# Patient Record
Sex: MALE | Race: WHITE | NOT HISPANIC OR LATINO | Employment: OTHER | URBAN - METROPOLITAN AREA
[De-identification: names, ages, dates, MRNs, and addresses within clinical notes are randomized per-mention and may not be internally consistent; named-entity substitution may affect disease eponyms.]

---

## 2018-06-12 ENCOUNTER — CONVERSION ENCOUNTER (OUTPATIENT)
Dept: FAMILY MEDICINE CLINIC | Facility: CLINIC | Age: 68
End: 2018-06-12

## 2018-06-12 ENCOUNTER — OFFICE VISIT CONVERTED (OUTPATIENT)
Dept: FAMILY MEDICINE CLINIC | Facility: CLINIC | Age: 68
End: 2018-06-12
Attending: FAMILY MEDICINE

## 2018-10-22 ENCOUNTER — CONVERSION ENCOUNTER (OUTPATIENT)
Dept: FAMILY MEDICINE CLINIC | Facility: CLINIC | Age: 68
End: 2018-10-22

## 2018-10-22 ENCOUNTER — OFFICE VISIT CONVERTED (OUTPATIENT)
Dept: FAMILY MEDICINE CLINIC | Facility: CLINIC | Age: 68
End: 2018-10-22
Attending: FAMILY MEDICINE

## 2018-11-20 ENCOUNTER — OFFICE VISIT CONVERTED (OUTPATIENT)
Dept: CARDIOLOGY | Facility: CLINIC | Age: 68
End: 2018-11-20
Attending: SPECIALIST

## 2018-11-20 ENCOUNTER — CONVERSION ENCOUNTER (OUTPATIENT)
Dept: CARDIOLOGY | Facility: CLINIC | Age: 68
End: 2018-11-20

## 2019-05-21 ENCOUNTER — OFFICE VISIT CONVERTED (OUTPATIENT)
Dept: CARDIOLOGY | Facility: CLINIC | Age: 69
End: 2019-05-21
Attending: SPECIALIST

## 2019-08-26 ENCOUNTER — OFFICE VISIT CONVERTED (OUTPATIENT)
Dept: FAMILY MEDICINE CLINIC | Facility: CLINIC | Age: 69
End: 2019-08-26
Attending: FAMILY MEDICINE

## 2019-08-27 ENCOUNTER — HOSPITAL ENCOUNTER (OUTPATIENT)
Dept: GENERAL RADIOLOGY | Facility: HOSPITAL | Age: 69
Discharge: HOME | End: 2019-08-27
Attending: FAMILY MEDICINE

## 2019-08-29 ENCOUNTER — HOSPITAL ENCOUNTER (OUTPATIENT)
Dept: GENERAL RADIOLOGY | Facility: HOSPITAL | Age: 69
Discharge: HOME | End: 2019-08-29
Attending: FAMILY MEDICINE

## 2019-09-25 ENCOUNTER — HOSPITAL ENCOUNTER (OUTPATIENT)
Dept: CARDIOLOGY | Facility: HOSPITAL | Age: 69
Discharge: HOME | End: 2019-09-25
Attending: SURGERY

## 2019-12-03 ENCOUNTER — OFFICE VISIT CONVERTED (OUTPATIENT)
Dept: CARDIOLOGY | Facility: CLINIC | Age: 69
End: 2019-12-03
Attending: SPECIALIST

## 2019-12-03 ENCOUNTER — CONVERSION ENCOUNTER (OUTPATIENT)
Dept: OTHER | Facility: HOSPITAL | Age: 69
End: 2019-12-03

## 2020-06-16 ENCOUNTER — OFFICE VISIT CONVERTED (OUTPATIENT)
Dept: CARDIOLOGY | Facility: CLINIC | Age: 70
End: 2020-06-16
Attending: SPECIALIST

## 2020-08-11 ENCOUNTER — CONVERSION ENCOUNTER (OUTPATIENT)
Dept: FAMILY MEDICINE CLINIC | Facility: CLINIC | Age: 70
End: 2020-08-11

## 2020-08-11 ENCOUNTER — OFFICE VISIT CONVERTED (OUTPATIENT)
Dept: FAMILY MEDICINE CLINIC | Facility: CLINIC | Age: 70
End: 2020-08-11
Attending: FAMILY MEDICINE

## 2020-08-20 ENCOUNTER — HOSPITAL ENCOUNTER (OUTPATIENT)
Dept: PREADMISSION TESTING | Facility: HOSPITAL | Age: 70
Discharge: HOME | End: 2020-08-20
Attending: INTERNAL MEDICINE

## 2020-08-21 LAB — SARS-COV-2 RNA SPEC QL NAA+PROBE: NOT DETECTED

## 2020-08-25 ENCOUNTER — HOSPITAL ENCOUNTER (OUTPATIENT)
Dept: INFUSION THERAPY | Facility: HOSPITAL | Age: 70
Setting detail: HOSPITAL OUTPATIENT SURGERY
Discharge: HOME | End: 2020-08-25
Attending: INTERNAL MEDICINE

## 2020-08-25 LAB
ANION GAP SERPL CALC-SCNC: 15 MMOL/L (ref 8–19)
BASOPHILS # BLD AUTO: 0.06 10*3/UL (ref 0–0.2)
BASOPHILS NFR BLD AUTO: 0.7 % (ref 0–3)
BUN SERPL-MCNC: 22 MG/DL (ref 5–25)
BUN/CREAT SERPL: 22 {RATIO} (ref 6–20)
CALCIUM SERPL-MCNC: 10 MG/DL (ref 8.7–10.4)
CHLORIDE SERPL-SCNC: 106 MMOL/L (ref 99–111)
CONV ABS IMM GRAN: 0.02 10*3/UL (ref 0–0.2)
CONV CO2: 25 MMOL/L (ref 22–32)
CONV IMMATURE GRAN: 0.2 % (ref 0–1.8)
CREAT UR-MCNC: 1.02 MG/DL (ref 0.7–1.2)
DEPRECATED RDW RBC AUTO: 45.3 FL (ref 35.1–43.9)
EOSINOPHIL # BLD AUTO: 0.69 10*3/UL (ref 0–0.7)
EOSINOPHIL # BLD AUTO: 8.1 % (ref 0–7)
ERYTHROCYTE [DISTWIDTH] IN BLOOD BY AUTOMATED COUNT: 13.1 % (ref 11.6–14.4)
GFR SERPLBLD BASED ON 1.73 SQ M-ARVRAT: >60 ML/MIN/{1.73_M2}
GLUCOSE SERPL-MCNC: 117 MG/DL (ref 70–99)
HCT VFR BLD AUTO: 47.5 % (ref 42–52)
HGB BLD-MCNC: 15.3 G/DL (ref 14–18)
INR PPP: 0.93 (ref 2–3)
LYMPHOCYTES # BLD AUTO: 3.5 10*3/UL (ref 1–5)
LYMPHOCYTES NFR BLD AUTO: 41.3 % (ref 20–45)
MCH RBC QN AUTO: 30.6 PG (ref 27–31)
MCHC RBC AUTO-ENTMCNC: 32.2 G/DL (ref 33–37)
MCV RBC AUTO: 95 FL (ref 80–96)
MONOCYTES # BLD AUTO: 0.75 10*3/UL (ref 0.2–1.2)
MONOCYTES NFR BLD AUTO: 8.8 % (ref 3–10)
NEUTROPHILS # BLD AUTO: 3.46 10*3/UL (ref 2–8)
NEUTROPHILS NFR BLD AUTO: 40.9 % (ref 30–85)
NRBC CBCN: 0 % (ref 0–0.7)
OSMOLALITY SERPL CALC.SUM OF ELEC: 298 MOSM/KG (ref 273–304)
PLATELET # BLD AUTO: 260 10*3/UL (ref 130–400)
PMV BLD AUTO: 9.7 FL (ref 9.4–12.4)
POTASSIUM SERPL-SCNC: 4.3 MMOL/L (ref 3.5–5.3)
PROTHROMBIN TIME: 10.2 S (ref 9.4–12)
RBC # BLD AUTO: 5 10*6/UL (ref 4.7–6.1)
SODIUM SERPL-SCNC: 142 MMOL/L (ref 135–147)
WBC # BLD AUTO: 8.48 10*3/UL (ref 4.8–10.8)

## 2020-10-01 ENCOUNTER — OFFICE VISIT CONVERTED (OUTPATIENT)
Dept: CARDIOLOGY | Facility: CLINIC | Age: 70
End: 2020-10-01
Attending: SPECIALIST

## 2020-10-26 ENCOUNTER — OFFICE VISIT CONVERTED (OUTPATIENT)
Dept: ORTHOPEDIC SURGERY | Facility: CLINIC | Age: 70
End: 2020-10-26
Attending: STUDENT IN AN ORGANIZED HEALTH CARE EDUCATION/TRAINING PROGRAM

## 2020-11-03 ENCOUNTER — CONVERSION ENCOUNTER (OUTPATIENT)
Dept: FAMILY MEDICINE CLINIC | Facility: CLINIC | Age: 70
End: 2020-11-03

## 2020-11-03 ENCOUNTER — OFFICE VISIT CONVERTED (OUTPATIENT)
Dept: FAMILY MEDICINE CLINIC | Facility: CLINIC | Age: 70
End: 2020-11-03
Attending: FAMILY MEDICINE

## 2020-11-03 ENCOUNTER — HOSPITAL ENCOUNTER (OUTPATIENT)
Dept: FAMILY MEDICINE CLINIC | Facility: CLINIC | Age: 70
Discharge: HOME | End: 2020-11-03
Attending: FAMILY MEDICINE

## 2020-11-04 LAB
ALBUMIN SERPL-MCNC: 4.4 G/DL (ref 3.5–5)
ALBUMIN/GLOB SERPL: 1.4 {RATIO} (ref 1.4–2.6)
ALP SERPL-CCNC: 58 U/L (ref 56–155)
ALT SERPL-CCNC: 20 U/L (ref 10–40)
ANION GAP SERPL CALC-SCNC: 20 MMOL/L (ref 8–19)
AST SERPL-CCNC: 22 U/L (ref 15–50)
BASOPHILS # BLD AUTO: 0.04 10*3/UL (ref 0–0.2)
BASOPHILS NFR BLD AUTO: 0.5 % (ref 0–3)
BILIRUB SERPL-MCNC: 0.44 MG/DL (ref 0.2–1.3)
BUN SERPL-MCNC: 23 MG/DL (ref 5–25)
BUN/CREAT SERPL: 24 {RATIO} (ref 6–20)
CALCIUM SERPL-MCNC: 9.3 MG/DL (ref 8.7–10.4)
CHLORIDE SERPL-SCNC: 103 MMOL/L (ref 99–111)
CHOLEST SERPL-MCNC: 114 MG/DL (ref 107–200)
CHOLEST/HDLC SERPL: 2.8 {RATIO} (ref 3–6)
CONV ABS IMM GRAN: 0.02 10*3/UL (ref 0–0.2)
CONV CO2: 22 MMOL/L (ref 22–32)
CONV IMMATURE GRAN: 0.3 % (ref 0–1.8)
CONV TOTAL PROTEIN: 7.5 G/DL (ref 6.3–8.2)
CREAT UR-MCNC: 0.94 MG/DL (ref 0.7–1.2)
DEPRECATED RDW RBC AUTO: 45.4 FL (ref 35.1–43.9)
EOSINOPHIL # BLD AUTO: 0.07 10*3/UL (ref 0–0.7)
EOSINOPHIL # BLD AUTO: 0.9 % (ref 0–7)
ERYTHROCYTE [DISTWIDTH] IN BLOOD BY AUTOMATED COUNT: 12.8 % (ref 11.6–14.4)
GFR SERPLBLD BASED ON 1.73 SQ M-ARVRAT: >60 ML/MIN/{1.73_M2}
GLOBULIN UR ELPH-MCNC: 3.1 G/DL (ref 2–3.5)
GLUCOSE SERPL-MCNC: 92 MG/DL (ref 70–99)
HCT VFR BLD AUTO: 45.1 % (ref 42–52)
HDLC SERPL-MCNC: 41 MG/DL (ref 40–60)
HGB BLD-MCNC: 14.5 G/DL (ref 14–18)
LDLC SERPL CALC-MCNC: 55 MG/DL (ref 70–100)
LYMPHOCYTES # BLD AUTO: 3.63 10*3/UL (ref 1–5)
LYMPHOCYTES NFR BLD AUTO: 46.4 % (ref 20–45)
MCH RBC QN AUTO: 30.7 PG (ref 27–31)
MCHC RBC AUTO-ENTMCNC: 32.2 G/DL (ref 33–37)
MCV RBC AUTO: 95.3 FL (ref 80–96)
MONOCYTES # BLD AUTO: 0.77 10*3/UL (ref 0.2–1.2)
MONOCYTES NFR BLD AUTO: 9.8 % (ref 3–10)
NEUTROPHILS # BLD AUTO: 3.3 10*3/UL (ref 2–8)
NEUTROPHILS NFR BLD AUTO: 42.1 % (ref 30–85)
NRBC CBCN: 0 % (ref 0–0.7)
OSMOLALITY SERPL CALC.SUM OF ELEC: 295 MOSM/KG (ref 273–304)
PLATELET # BLD AUTO: 274 10*3/UL (ref 130–400)
PMV BLD AUTO: 10.1 FL (ref 9.4–12.4)
POTASSIUM SERPL-SCNC: 4 MMOL/L (ref 3.5–5.3)
PSA SERPL-MCNC: 3.32 NG/ML (ref 0–4)
RBC # BLD AUTO: 4.73 10*6/UL (ref 4.7–6.1)
SODIUM SERPL-SCNC: 141 MMOL/L (ref 135–147)
TRIGL SERPL-MCNC: 92 MG/DL (ref 40–150)
TSH SERPL-ACNC: 2.41 M[IU]/L (ref 0.27–4.2)
VLDLC SERPL-MCNC: 18 MG/DL (ref 5–37)
WBC # BLD AUTO: 7.83 10*3/UL (ref 4.8–10.8)

## 2020-11-05 LAB
CONV HEPATITIS C AB WITH REFLEX TO CONFIRMATION: <0.1 S/CO RATIO (ref 0–0.9)
CONV HEPATITIS COMMENT: NORMAL

## 2021-01-19 ENCOUNTER — HOSPITAL ENCOUNTER (OUTPATIENT)
Dept: OTHER | Facility: HOSPITAL | Age: 71
Discharge: HOME | End: 2021-01-19
Attending: INTERNAL MEDICINE

## 2021-02-12 ENCOUNTER — HOSPITAL ENCOUNTER (OUTPATIENT)
Dept: VACCINE CLINIC | Facility: HOSPITAL | Age: 71
Discharge: HOME | End: 2021-02-12
Attending: INTERNAL MEDICINE

## 2021-04-06 ENCOUNTER — OFFICE VISIT CONVERTED (OUTPATIENT)
Dept: CARDIOLOGY | Facility: CLINIC | Age: 71
End: 2021-04-06
Attending: SPECIALIST

## 2021-04-20 ENCOUNTER — HOSPITAL ENCOUNTER (OUTPATIENT)
Dept: FAMILY MEDICINE CLINIC | Facility: CLINIC | Age: 71
Discharge: HOME | End: 2021-04-20
Attending: FAMILY MEDICINE

## 2021-04-20 ENCOUNTER — OFFICE VISIT CONVERTED (OUTPATIENT)
Dept: FAMILY MEDICINE CLINIC | Facility: CLINIC | Age: 71
End: 2021-04-20
Attending: FAMILY MEDICINE

## 2021-04-20 LAB
BASOPHILS # BLD AUTO: 0.04 10*3/UL (ref 0–0.2)
BASOPHILS NFR BLD AUTO: 0.5 % (ref 0–3)
CONV ABS IMM GRAN: 0.01 10*3/UL (ref 0–0.2)
CONV IMMATURE GRAN: 0.1 % (ref 0–1.8)
DEPRECATED RDW RBC AUTO: 48.2 FL (ref 35.1–43.9)
EOSINOPHIL # BLD AUTO: 0.1 10*3/UL (ref 0–0.7)
EOSINOPHIL # BLD AUTO: 1.2 % (ref 0–7)
ERYTHROCYTE [DISTWIDTH] IN BLOOD BY AUTOMATED COUNT: 13.2 % (ref 11.6–14.4)
HCT VFR BLD AUTO: 43.3 % (ref 42–52)
HGB BLD-MCNC: 13.8 G/DL (ref 14–18)
LYMPHOCYTES # BLD AUTO: 3.85 10*3/UL (ref 1–5)
LYMPHOCYTES NFR BLD AUTO: 44.8 % (ref 20–45)
MCH RBC QN AUTO: 31.2 PG (ref 27–31)
MCHC RBC AUTO-ENTMCNC: 31.9 G/DL (ref 33–37)
MCV RBC AUTO: 98 FL (ref 80–96)
MONOCYTES # BLD AUTO: 0.87 10*3/UL (ref 0.2–1.2)
MONOCYTES NFR BLD AUTO: 10.1 % (ref 3–10)
NEUTROPHILS # BLD AUTO: 3.73 10*3/UL (ref 2–8)
NEUTROPHILS NFR BLD AUTO: 43.3 % (ref 30–85)
NRBC CBCN: 0 % (ref 0–0.7)
PLATELET # BLD AUTO: 277 10*3/UL (ref 130–400)
PMV BLD AUTO: 10.3 FL (ref 9.4–12.4)
RBC # BLD AUTO: 4.42 10*6/UL (ref 4.7–6.1)
WBC # BLD AUTO: 8.6 10*3/UL (ref 4.8–10.8)

## 2021-04-21 ENCOUNTER — HOSPITAL ENCOUNTER (OUTPATIENT)
Dept: GASTROENTEROLOGY | Facility: HOSPITAL | Age: 71
Setting detail: HOSPITAL OUTPATIENT SURGERY
Discharge: HOME | End: 2021-04-21
Attending: INTERNAL MEDICINE

## 2021-05-10 NOTE — H&P
History and Physical      Patient Name: Alexandre Alvarado   Patient ID: [id number]   Sex: Male   YOB: 1950    Primary Care Provider: Hawk Ruth III MD   Referring Provider: Hawk Ruth III MD    Visit Date: October 26, 2020    Provider: Wolf Ellington MD   Location: [facility]   Location Address: [address] [place] [address]  [id number]   Location Phone: [phone]          Chief Complaint  · left knee pain      History Of Present Illness  Alexandre Alvarado is a 70 year old /White male who presents today to [facility].      Alexandre presented to the office today for examination of his left knee. He states the pain started 8 weeks ago. He feels that he has strained it somehow while he was fishing. He first experienced the pain while he was sitting down and attempted to turn to his side, to stand up and go down some steps. Later that night his knee became sore. The next day he could barley walk.  He had swelling initially that resolved.  He states that he feels that he has no strength in his knee now. He experiences predominantly anterior knee pain.  He states there is popping and cracking of the knee right over the patella. He says that he feels his knee will give out at times. He is not taking any medication other than ibuprofen. He denies previous history or knee injections or surgeries. He has history of 3 previous back surgery. He is a nonsmoker. He is nondiabetic. He is otherwise overall healthy.                    Past Medical History  Abdominal Aortic Aneurysm; Advance directive discussed with patient; Back Pain ; Cardiac Dysrhythmia; Cerumen Impaction; Chronic back pain; Great toe pain, left; High cholesterol; Hip Pain; History of colonoscopy; Hyperlipidemia; Hypertension, essential; Lumbago/low back pain; Medication management; Medication refill; Myocardial Infarction; Pacemaker; Prostatic Hypertrophy, Benign; Screening for cancer; unspecified; Shoulder  "pain, left         Past Surgical History  Back surgery; Pacemaker         Medication List  benazepril-hydrochlorothiazide 20-12.5 mg oral tablet; Eliquis 5 mg oral tablet; rosuvastatin 20 mg oral tablet; sildenafil 100 mg oral tablet         Allergy List  NO KNOWN DRUG ALLERGIES         Family Medical History  Chronic Obstructive Pulmonary Disease; Stroke; Heart Disease; Cancer, Unspecified; Diabetes, unspecified type; Heart failure; Spine Problems         Social History  Active but no formal exercise; Alcohol (Current some day); ; Tobacco (Never)         Immunizations  Name Date Admin   Influenza 11/11/2014   Influenza 10/22/2014   Pneumococcal 10/22/2014   Prevnar 13 11/14/2016         Review of Systems  · Constitutional  o Denies  o : fever, chills, weight loss  · Cardiovascular  o Denies  o : chest pain, shortness of breath  · Gastrointestinal  o Denies  o : liver disease, heartburn, nausea, blood in stools  · Genitourinary  o Denies  o : painful urination, blood in urine  · Integument  o Denies  o : rash, itching  · Neurologic  o Denies  o : headache, weakness, loss of consciousness  · Musculoskeletal  o Admits  o : left knee pain   · Psychiatric  o Denies  o : drug/alcohol addiction, anxiety, depression      Vitals  Date Time BP Position Site L\R Cuff Size HR RR TEMP (F) WT  HT  BMI kg/m2 BSA m2 O2 Sat FR L/min FiO2        10/26/2020 02:33 PM      87 - R   210lbs 8oz 5'  11\" 29.36 2.19 98 %            Physical Examination  · Constitutional  o Appearance  o : well developed, well-nourished, no obvious deformities present  · Head and Face  o Head  o :   § Inspection  § : normocephalic  o Face  o :   § Inspection  § : no facial lesions  · Eyes  o Conjunctivae  o : conjunctivae normal  o Sclerae  o : sclerae white  · Ears, Nose, Mouth and Throat  o Ears  o :   § External Ears  § : appearance within normal limits  § Hearing  § : intact  o Nose  o :   § External Nose  § : appearance " normal  · Neck  o Inspection/Palpation  o : normal appearance  o Range of Motion  o : full range of motion  · Respiratory  o Respiratory Effort  o : breathing unlabored  o Inspection of Chest  o : normal appearance  o Auscultation of Lungs  o : no audible wheezing or rales  · Cardiovascular  o Heart  o : regular rate  · Gastrointestinal  o Abdominal Examination  o : soft and non-tender  · Skin and Subcutaneous Tissue  o General Inspection  o : intact, no rashes  · Psychiatric  o General  o : Alert and oriented x3  o Judgement and Insight  o : judgment and insight intact  o Mood and Affect  o : mood normal, affect appropriate  · In Office Procedures  o View  o : Standing AP/Standing Lateral/Sunrise/30 degree PA  o Site  o : left, knee  o Indication  o : Left knee pain   o Study  o : X-rays ordered, taken in the office, and reviewed today.  o Xray  o : Weightbearing AP, PA, lateral, and sunrise views of the left knee have been reviewed. There is a mild varus deformity. Minimal arthritic change noted along the medial lateral compartments. There is adequate joint space remaining. Patellofemoral osteophytes are noted. Peripheral spurring in the patellofemoral compartment noted. No other abnormalities appreciated.  o Comparative Data  o : No comparative data found  · Hip Exam  o Hip Exam  o :   § Right Hip  § : No pain with passive hip flexion, internal rotation, external rotation  § Left Hip  § : No pain with passive hip flexion, internal rotation, external rotation  · Knee Exam  o Knee Exam  o :   § Right Knee  § : Full, nonpainful range of motion. No effusion. No joint line pain. Stable to ligamentous stress. Neurovascular intact distally.  § Left Knee  § : No effusion. No medial or lateral joint line pain. Baker's cyst noted posteriorly. Crepitus noted with patellofemoral motion. Range of motion from less than 5 degrees short of extension to 130 degrees of flexion. Painful with terminal flexion. Stable to varus,  valgus, Lachman, posterior drawer testing. No pain or joint line clicking with Laurie's testing. Neurovascular intact distally.          Assessment  · Primary osteoarthritis of left knee     715.16/M17.12  · Left knee pain, unspecified chronicity     719.46/M25.562      Plan  · Orders  o Knee (Left) [facility] ([id number]) - 719.46/M25.562 - 10/26/2020  · Medications  o Medications have been Reconciled  o Transition of Care or Provider Policy  · Instructions  o X-rays reviewed by Dr. Ellington.  o Reviewed the patient's Past Medical, Social, and Family history as well as the ROS at today's visit, no changes.  o Call or return if worsening symptoms.  o Exercise handout given.  o Follow Up PRN.  o Alexandre has left knee arthritis primarily affecting the patellofemoral compartment. He seems to have exacerbated this approximately 2 months ago. His swelling and pain are improving. He has a nonantalgic gait and is not requiring any assistive devices. He does not have any symptoms consistent with internal derangement. We discussed treatment options. He may continue to take anti-inflammatory medications as able. We discussed physical therapy to help his range of motion and strength within the knee. We discussed formal therapy versus a home exercise program. He would like to start with a home exercise program. He was interested in home exercises for his core and back as well given his long history of back issues. We provided him with a handout for those today. We discussed follow-up. He would like to follow-up as needed going forward. He may call with any questions or concerns.  o Electronically Identified Patient Education Materials Provided Electronically            Electronically Signed by: Wolf Ellington MD -Author on October 27, 2020 08:38:38 PM

## 2021-05-12 ENCOUNTER — HOSPITAL ENCOUNTER (OUTPATIENT)
Dept: GASTROENTEROLOGY | Facility: HOSPITAL | Age: 71
Setting detail: HOSPITAL OUTPATIENT SURGERY
Discharge: HOME | End: 2021-05-12
Attending: INTERNAL MEDICINE

## 2021-05-13 NOTE — PROGRESS NOTES
Progress Note      Patient Name: Alexandre Alvarado   Patient ID: [id number]   Sex: Male   YOB: 1950    Primary Care Provider: Hawk Ruth III MD   Referring Provider: Hawk Ruth III MD    Visit Date: November 3, 2020    Provider: Hawk Ruth III MD   Location: [facility]   Location Address: [address] [place] [address]  [id number]   Location Phone: [phone]          Chief Complaint  · Adult General Male Physical Exam      History Of Present Illness  Alexandre Alvarado is a 70 year old /White male who presents for evaluation and treatment of: here for complete physical.      HPI     patient 70 years old here today for complete physical.  He did check his pacemaker to see if he was said any of these were atrial fibrillation.  No chest pain.  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  Wondering if he needs to be on the ill closer just aspirin.      history of hypertension   history of a pacemaker   history of a TIA in 2017 has been on Eliquis for a while.    history of atrial fib that was intermittent.  Still having occasional palpitations.     Review of systems     skin skeletal sprain left knee while fishing saw Dr. Ellington and does have a good bit of osteoarthritis cartilages good.  Has his continues to have his lower back pain.  But is still able to golf and fish.  GI had a colonoscopy that was normal in 2015 not due till 2025  Skin no lesions  Neurologic no further TIAs since the 1/20/2017.  Had him see Dr. Aguayo then  Cardiovascular history of a myocardial infarction.  Sees Dr. pettit  Psych no particular depression or anxiety.  New.   vacuum pump helps    Physical exam    weight is 206 4 pound weight loss temperature 98.5 pulse ox 94 heart rate 80 blood pressure 128/72  General no distress  Cardiovascular regular rhythm no murmur pacemaker in place  Respiratory no increased work of breathing lungs clear and equal bilaterally  no rales no rhonchi no wheezes  Skin numerous seborrheic keratotic mitoses no other lesions  Neurologic mentation is normal speech is normal gait is normal  Musculoskeletal hips show good range of motion knees show some crepitations  Rectal 1+ prostate hypertrophy no asymmetry no nodules  Abdomen soft and tender no hepatosplenomegaly no masses no abnormal pulsations  Neck no adenopathy no thyroidomegaly  ENT TMs are negative no oral lesions    assessment     #1 Hypertension controlled   #2 coronary artery disease Dr. pettit is sees doing well has pacemaker  TIA with history of atrial fib on Eliquis will check back with Dr. Dahl for more documentation of the atrial fib.  Next osteoarthritis left knee next chronic lower back pain    Plan     recheck as needed we will discuss with  but stay on all meds  .       Past Medical History  Disease Name Date Onset Notes   Abdominal Aortic Aneurysm --  --    Advance directive discussed with patient 08/11/2020 --    Arthritis --  --    Back Pain  --  --    Cardiac Dysrhythmia --  --    Cerumen Impaction 08/11/2020 --    Chronic back pain 08/11/2020 --    Great toe pain, left 08/11/2020 --    Heart Attack --  --    Heart Disease --  --    High cholesterol 08/11/2020 --    Hip Pain --  --    History of colonoscopy 08/11/2020 --    Hyperlipidemia --  --    Hypertension, essential --  --    Lumbago/low back pain 10/17/2012 --    Medication management 08/11/2020 --    Medication refill 08/11/2020 --    Myocardial Infarction 2011 --    Pacemaker 08/11/2020 --    Prostatic Hypertrophy, Benign --  --    Screening for cancer; unspecified 09/10/2014 --    Seasonal allergies --  --    Shoulder pain, left 08/11/2020 --    Stroke --  --          Past Surgical History  Procedure Name Date Notes   Back --  --    Back surgery --  --    Colonoscopy --  --    heart surgery --  --    Pacemaker --  --    Pacemaker. --  --          Medication List  Name Date Started Instructions    benazepril-hydrochlorothiazide 20-12.5 mg oral tablet 11/03/2020 take 1 tablet by oral route once daily   Eliquis 5 mg oral tablet 11/03/2020 TAKE 1 TABLET BY MOUTH TWICE A DAY   rosuvastatin 20 mg oral tablet 11/03/2020 take 1 tablet (20 mg) by oral route once daily for 90 days   sildenafil 100 mg oral tablet 11/03/2020 take 1 tablet (100 mg) by oral route once daily as needed approximately 1 hour before sexual activity         Allergy List  Allergen Name Date Reaction Notes   NO KNOWN DRUG ALLERGIES --  --  --          Family Medical History  Disease Name Relative/Age Notes   Chronic Obstructive Pulmonary Disease Father/   --    Stroke Father/   Father   Heart Disease Father/   --    Cancer, Unspecified Father/  Mother/   Mother; Father   Diabetes, unspecified type  --    Heart failure  --    Spine Problems  --    Osteoporosis Mother/   Mother   Family history of Arthritis Mother/   Mother         Social History  Finding Status Start/Stop Quantity Notes   Active but no formal exercise --  --/-- --  --    Alcohol Current some day --/-- --  06/05/2017 - 04/19/2017 - 3-4 glasses of wine a week    Alcohol Use Current some day --/-- --  rarely drinks, less than 1 drink per day, has been drinking for 31 or more years   lives with spouse --  --/-- --  --     --  --/-- --  --    . --  --/-- --  --    Recreational Drug Use Never --/-- --  no   Retired. --  --/-- --  --    Tobacco Never --/-- --  never smoker         Immunizations  NameDate Admin Mfg Trade Name Lot Number Route Inj VIS Given VIS Publication   Jmebfsmmt24/15/2020 SKB Fluarix, quadrivalent, preservative free [id number] NE NE 11/03/2020    Comments: [employer]sh[employer]er   Qldvzkawsyzc25/22/2014 MSD PNEUMOVAX 23 [id number] IM RD 10/22/2014 10/06/2009   Comments:    Prevnar 1311/14/2016 WAL PREVNAR 13 [id number] IM LD 11/14/2016 11/05/2015   Comments:          Review of Systems  · Constitutional  o * See HPI  · Eyes  o * See HPI  · HENT  o * See HPI  · Breasts  o * See  "HPI  · Cardiovascular  o * See HPI  · Respiratory  o * See HPI  · Gastrointestinal  o * See HPI  · Genitourinary  o * See HPI  · Integument  o * See HPI  · Neurologic  o * See HPI  · Musculoskeletal  o * See HPI  · Endocrine  o * See HPI  · Psychiatric  o * See HPI  · Heme-Lymph  o * See HPI  · Allergic-Immunologic  o * See HPI      Vitals  Date Time BP Position Site L\R Cuff Size HR RR TEMP (F) WT  HT  BMI kg/m2 BSA m2 O2 Sat FR L/min FiO2 HC       11/03/2020 04:09 /72 Sitting    80 - R  98.5 205lbs 16oz 5'  11\" 28.73 2.16 94 %  21%              Results  · In-Office Procedures  o Lab procedure  § IOP - Urinalysis without Microscopy (Clinitek) [facility] ([id number])   § Color Ur: Yellow   § Clarity Ur: Clear   § Glucose Ur Ql Strip: Negative   § Bilirub Ur Ql Strip: Negative   § Ketones Ur Ql Strip: Negative   § Sp Gr Ur Qn: 1.030   § Hgb Ur Ql Strip: Negative   § pH Ur-LsCnc: 6.0   § Prot Ur Ql Strip: Negative   § Urobilinogen Ur Strip-mCnc: 0.2 E.U./dL   § Nitrite Ur Ql Strip: Negative   § WBC Est Ur Ql Strip: Negative       Assessment  · Screening for depression     V79.0/Z13.89  · Need for hepatitis C screening test     V73.89/Z11.59  · Screening for prostate cancer     V76.44/Z12.5  · Encounter for screening for cardiovascular disorders     V81.2/Z13.6  · General Medical Exam, Adult (CPE)     V70.0/Z00.00  · High cholesterol     272.0/E78.00  · Medication management     V58.69/Z79.899  · Medication refill     V68.1/Z76.0  · Pacemaker     V45.01/Z95.0  · Hypertension, essential     401.9/I10    Problems Reconciled  Plan  · Orders  o Hepatitis C antibody MEDICARE screening [facility] (, [address]) - V73.89/Z11.59 - 11/03/2020  o Male Physical Primary Care Panel (CMP, CBC, TSH, Lipid, PSA) [facility] (, [address], [address], [address], [address]) - V70.0/Z00.00, V58.69/Z79.899, V76.44/Z12.5, V81.2/Z13.6 - 11/03/2020  o ACO-39: Current medications updated and reviewed (, 9953F) - - " 11/03/2020  · Medications  o benazepril-hydrochlorothiazide 20-12.5 mg oral tablet   SIG: take 1 tablet by oral route once daily   DISP: (90) Tablet with 3 refills  Refilled on 11/03/2020     o Eliquis 5 mg oral tablet   SIG: TAKE 1 TABLET BY MOUTH TWICE A DAY   DISP: (180) Tablet with 3 refills  Refilled on 11/03/2020     o rosuvastatin 20 mg oral tablet   SIG: take 1 tablet (20 mg) by oral route once daily for 90 days   DISP: (90) Tablet with 3 refills  Refilled on 11/03/2020     o sildenafil 100 mg oral tablet   SIG: take 1 tablet (100 mg) by oral route once daily as needed approximately 1 hour before sexual activity   DISP: (30) Tablet with 11 refills  Refilled on 11/03/2020     o Medications have been Reconciled  o Transition of Care or Provider Policy  · Instructions  o Depression Screen completed and scanned into the EMR under the designated folder within the patient's documents.  o Today's PHQ-9 result is 3___  o CMS (Medicare) estimates that one in six persons older than 65 suffers from depression and that depression in older patients occurs in 25% of those with other medical illnesses. US Preventative Services Task Force indicates that depression screening and support improves clinical outcomes in older adults. This service is covered by Medicare once a year as part of helping maintain a healthy you!  o The provider screening met the required time of 15 minutes.  o Medicare suggests a once in a lifetime screening for Hepatitis C for all Medicare beneficiaries born between 4143-6816.  o Patient is taking medications as prescribed and doing well.   o Take all medications as prescribed/directed.  o Patient instructed/educated on their diet and exercise program.  o Patient was educated/instructed on their diagnosis, treatment and medications prior to discharge from the clinic today.  o Bring all medicines with their bottles to each office visit.  o Time spent with the patient was 30 minutes, more than 50% face  to face.  o Chronic conditions reviewed and taken into consideration for today's treatment plan.  o Discussed Covid-19 precautions including, but not limited to, social distancing, avoid touching your face, and hand washing.             Electronically Signed by: Tanika Pace, -Author on November 3, 2020 06:00:24 PM  Electronically Co-signed by: Hawk Ruth III MD -Reviewer on November 4, 2020 01:17:50 PM

## 2021-05-13 NOTE — PROGRESS NOTES
"   Progress Note      Patient Name: Alexandre Alvarado   Patient ID: [id number]   Sex: Male   YOB: 1950    Primary Care Provider: Hawk Ruth III MD   Referring Provider: Hawk Ruth III MD    Visit Date: October 1, 2020    Provider: Kosta Dahl MD   Location: [facility]   Location Address: [address] [place] [address], [address]   [place], [place]  [id number]   Location Phone: [phone]          Chief Complaint  · Bradycardia   · Presence of pacemaker       History Of Present Illness  Alexandre Alvarado is a 70 year old /White male with a history of a pacemaker implantation, who comes in for a follow-up visit and pacemaker check. He denies any chest pain. No shortness of breath.   CURRENT MEDICATIONS: include Benazepri/HCT 20/12.5 mg daily; Eliquis 5 mg b.i.d.; Rosuvastatin 20 mg daily. The dosage and frequency of the medications were reviewed with the patient.   PAST MEDICAL HISTORY: Bradycardia; essential hypertension; hyperlipidemia; presence of pacemaker.   PSYCHOSOCIAL HISTORY: He rarely drinks alcohol.       Review of Systems  · Cardiovascular  o Admits  o : palpitations (fast, fluttering, or skipping beats), shortness of breath while walking or lying flat  o Denies  o : swelling (feet, ankles, hands), chest pain or angina pectoris   · Respiratory  o Denies  o : chronic or frequent cough, asthma or wheezing      Vitals  Date Time BP Position Site L\R Cuff Size HR RR TEMP (F) WT  HT  BMI kg/m2 BSA m2 O2 Sat FR L/min FiO2 HC       10/01/2020 01:37 /76 Sitting    64 - R   209lbs 0oz 5'  11\" 29.15 2.18       10/01/2020 01:37 /80 Sitting    66 - R                   Physical Examination  · Constitutional  o Appearance  o : Awake, alert, cooperative, pleasant.  · Respiratory  o Inspection of Chest  o : No chest wall deformities, moving equal.  o Auscultation of Lungs  o : Good air entry with vesicular breath sounds.  · Cardiovascular  o Heart  o :   § Auscultation of " Heart  § : S1 and S2 regular. No S3. No S4. No murmurs.  o Peripheral Vascular System  o :   § Extremities  § : Peripheral pulses were well felt. No edema. No cyanosis.  · Gastrointestinal  o Abdominal Examination  o : No masses or organomegaly noted.          Assessment     ASSESSMENT AND PLAN:    1.  Presence of pacemaker:  Pacemaker was checked with a .  RV sensitivity was reprogrammed.       He feels better.  2.  Essential hypertension controlled:  Continue current dose of Benazepril.  3.  Paroxysmal atrial fibrillation:  Continue Eliquis for stroke prevention.  4.  See me back in 6 months.    Kosta Dahl MD, [facility]  YOSEF/dia           This note was transcribed by Elisha Hancock.  dia/YOSEF  The above service was transcribed by Elisha Hancock, and I attest to the accuracy of the note.  YOSEF               Electronically Signed by: Elisha Hancock-, -Author on October 7, 2020 05:26:34 AM  Electronically Co-signed by: Kosta Dahl MD -Reviewer on October 7, 2020 11:36:00 AM

## 2021-05-13 NOTE — PROGRESS NOTES
Progress Note      Patient Name: Alexandre Alvarado   Patient ID: [id number]   Sex: Male   YOB: 1950    Primary Care Provider: Hawk Ruth III MD   Referring Provider: Hawk Ruth III MD    Visit Date: August 11, 2020    Provider: Hawk Ruth III MD   Location: [facility]   Location Address: [address] [place] [address]  [id number]   Location Phone: [phone]          Chief Complaint  · check left great toe  · check ears      History Of Present Illness  Alexandre Alvarado is a 70 year old /White male who presents for evaluation and treatment of: wants to have his ears check and his great toe. Pt is scheduled for his yearly check up and lab work is due but the pt request to do his yearly check up at a later time.   Removal of Ear Wax  Ear lavage for both ears after unsuccessful attempt of removal of wax plug with curette and/or cotton swab.   Ear flushed with warm water until wax plug flushed out. Provider re-examined ear and removed remaining wax with curette and dry cotton swab.      HPI     patient 70-year-old male history of hypertension on Benzapril hydrochlorothiazide 2012.5 takes Eliquis for atrial fib and Crestor 24 hypercholesterolemia has a pacemaker for his complete heart block has been feeling reasonably well.  Has chronic low back pain but is been doing well with that recently. pt wants his ears checked and he is having left great toe pain and left shoulder pain.    Review of systems    GI last colonoscopy was 2015 not due again since he is 70.    Cardiovascular no chest pain no palpitations pacemaker kick in at 67 needs to see Dr. Vanegas  respiratory no shortness of breath no dyspnea on exertion  Musculoskeletal pain has right toe or metatarsal phalangeal joint.  Discussed a stiff soled will inject with 10 Depo-Medrol  ENT decreased hearing    PE    Blood pressure 130/70 weight 208 temperature 97.8 heart rate 84 pulse ox 96 General no distress  EENT bilateral cerumen impactions need to lavage  Cardiovascular regular rhythm occasional extra beat no murmur  Respiratory no increased work of breathing lungs clinical bilaterally no rales no rhonchi no wheezes  Musculoskeletal osteoarthritis left great toe metatarsal phalangeal joint new    In office procedure     after careful chlorhexidine prep patient had 10 mg Depo-Medrol injected into the metatarsal phalangeal joint.    In office procedure     patient had both cerumen impactions lavaged out here in the office.    Assessment /Plan    #1 bilateral cerumen impactions   #2 osteoarthritis great toe Depo-Medrol injected 10 mg left great toe metatarsal phalangeal joint   #3 chronic back pain controlled   #4 atrial fib on Eliquis number  #5 Hypertension     recheck for his physical later.       Past Medical History  Disease Name Date Onset Notes   Abdominal Aortic Aneurysm --  --    Advance directive discussed with patient 08/11/2020 --    Back Pain  --  --    Cardiac Dysrhythmia --  --    Cerumen Impaction 08/11/2020 --    Chronic back pain 08/11/2020 --    Great toe pain, left 08/11/2020 --    High cholesterol 08/11/2020 --    Hip Pain --  --    History of colonoscopy 08/11/2020 --    Hyperlipidemia --  --    Hypertension, essential --  --    Lumbago/low back pain 10/17/2012 --    Medication management 08/11/2020 --    Medication refill 08/11/2020 --    Myocardial Infarction 2011 --    Pacemaker 08/11/2020 --    Prostatic Hypertrophy, Benign --  --    Screening for cancer; unspecified 09/10/2014 --    Shoulder pain, left 08/11/2020 --          Past Surgical History  Procedure Name Date Notes   Back surgery --  --    Pacemaker --  --          Medication List  Name Date Started Instructions   benazepril-hydrochlorothiazide 20-12.5 mg oral tablet 08/11/2020 take 1 tablet by oral route once daily   Eliquis 5 mg oral tablet 08/11/2020 TAKE 1 TABLET BY MOUTH TWICE A DAY   rosuvastatin 20 mg oral tablet 08/11/2020 take  "1 tablet (20 mg) by oral route once daily for 90 days         Allergy List  Allergen Name Date Reaction Notes   NO KNOWN DRUG ALLERGIES --  --  --        Allergies Reconciled  Family Medical History  Disease Name Relative/Age Notes   Chronic Obstructive Pulmonary Disease Father/   --    Stroke  --    Heart Disease Father/   --    Cancer, Unspecified Mother/   --    Diabetes, unspecified type  --    Heart failure  --    Spine Problems  --          Social History  Finding Status Start/Stop Quantity Notes   Active but no formal exercise --  --/-- --  --    Alcohol Current some day --/-- --  06/05/2017 - 04/19/2017 - 3-4 glasses of wine a week     --  --/-- --  --    Tobacco Never --/-- --  --          Immunizations  NameDate Admin Mfg Trade Name Lot Number Route Inj VIS Given VIS Publication   Gquvgoiwz58/11/2014 SKB Fluarix, quadrivalent, preservative free [id number] [facility] 11/11/2014 07/02/2012   Comments:    Qbzdeklcr34/22/2014 SKB Fluarix, quadrivalent, preservative free 2A2KX IM LD 10/22/2014 07/02/2012   Comments:    Ztzmcpviiijo77/22/2014 MSD PNEUMOVAX 23 [id number] IM RD 10/22/2014 10/06/2009   Comments:    Prevnar 1311/14/2016 WAL PREVNAR 13 [id number]  LD 11/14/2016 11/05/2015   Comments:          Review of Systems  · Constitutional  o * See HPI  · Eyes  o * See HPI  · HENT  o * See HPI  · Breasts  o * See HPI  · Cardiovascular  o * See HPI  · Respiratory  o * See HPI  · Gastrointestinal  o * See HPI  · Genitourinary  o * See HPI  · Integument  o * See HPI  · Neurologic  o * See HPI  · Musculoskeletal  o * See HPI  · Endocrine  o * See HPI  · Psychiatric  o * See HPI  · Heme-Lymph  o * See HPI  · Allergic-Immunologic  o * See HPI      Vitals  Date Time BP Position Site L\R Cuff Size HR RR TEMP (F) WT  HT  BMI kg/m2 BSA m2 O2 Sat        08/11/2020 03:31 /70 Sitting    84 - R  97.8 208lbs 0oz 5'  11\" 29.01 2.17 96 %              Results  · In-Office Procedures  o Medical procedure  § IOP - Joint Injection " MAJOR [facility] ([id number])   § Date received: In Office Today   § Topical Prep: Chloroprep   § Topical Anethesia: Lidocaine 1% Plain   § Medication Injected: Depo-Medrol 20mg   § Dressing: Bandaid       Assessment  · Osteoarthritis     715.90/M19.90  · Bilateral impacted cerumen     380.4/H61.23  · Lumbago/low back pain     724.2/M54.5  · Hypertension, essential     401.9/I10  · History of colonoscopy     V45.89/Z98.890  · Medication management     V58.69/Z79.899  · Medication refill     V68.1/Z76.0  · High cholesterol     272.0/E78.00  · Pacemaker Stable     V45.01/Z95.0  · Advance directive discussed with patient     V65.49/Z71.89  · Great toe pain, left     729.5/M79.675  · Shoulder pain, left     719.41/M25.512  · Chronic back pain       Dorsalgia, unspecified     724.5/M54.9  Other chronic pain     724.5/G89.29    Problems Reconciled  Plan  · Orders  o Cerumen Removal by MA or Nurse, Unilateral [facility] ([id number]) - - 08/11/2020  o ACO - Pt declines to or was not able to provide an Advance Care Plan or name a Surrogate Decision Maker ([id number]) - - 08/11/2020  o ACO-39: Current medications updated and reviewed () - - 08/11/2020  o Depo Medrol 20 mg Inj () - 729.5/M79.675, 715.90/M19.90 - 08/11/2020   Injection - Depo Medrol 20 mg; Dose: 10 mg; Site: Not Entered; Route: intra-articular; Date: 08/11/2020 18:35:13; Exp: 03/01/2021; Lot: [id number]; Mfg: Fastr; TradeName: Depo-Medrol; Location: [facility]; Administered By: Hawk Ruth III MD; Comment: NDC [phone] LEFT GREAT TOE  · Medications  o benazepril-hydrochlorothiazide 20-12.5 mg oral tablet   SIG: take 1 tablet by oral route once daily   DISP: (90) Tablet with 0 refills  Adjusted on 08/11/2020     o Eliquis 5 mg oral tablet   SIG: TAKE 1 TABLET BY MOUTH TWICE A DAY   DISP: (180) Tablet with 0 refills  Adjusted on 08/11/2020     o rosuvastatin 20 mg oral tablet   SIG: take 1 tablet (20 mg) by oral route once daily for 90 days   DISP: (90) Tablet with  0 refills  Adjusted on 08/11/2020     · Instructions  o Ear canal(s) irrigated with warm water and cerumen removed with curette.  o Patient is taking medications as prescribed and doing well.   o Take all medications as prescribed/directed.  o Patient instructed/educated on their diet and exercise program.  o Patient was educated/instructed on their diagnosis, treatment and medications prior to discharge from the clinic today.  o Bring all medicines with their bottles to each office visit.  o Time spent with the patient was 50 minutes, more than 50% face to face.  o Chronic conditions reviewed and taken into consideration for today's treatment plan.  o Discussed Covid-19 precautions including, but not limited to, social distancing, avoid touching your face, and hand washing.   o Electronically Identified Patient Education Materials Provided Electronically            Electronically Signed by: Tanika Pace, -Author on August 11, 2020 06:40:09 PM  Electronically Co-signed by: Hawk Ruth III MD -Reviewer on August 12, 2020 09:25:27 AM

## 2021-05-13 NOTE — PROGRESS NOTES
"   Progress Note      Patient Name: Alexandre Alvarado   Patient ID: [id number]   Sex: Male   YOB: 1950    Primary Care Provider: Hawk Ruth III MD   Referring Provider: Hawk Ruth III MD    Visit Date: June 16, 2020    Provider: Kosta Dahl MD   Location: [facility]   Location Address: [address] [place] [address], [facility]   [place], [place]  [id number]   Location Phone: [phone]          Chief Complaint  · Presence of pacemaker   · Hypertension   · Bradycardia       History Of Present Illness  Alexandre Alvarado is a 70 year old /White male with a presence of a pacemaker. He denies any chest pain. No shortness of breath.   CURRENT MEDICATIONS: include Benazepril/HCT 20/12.5 mg daily; Rosuvastatin 20 mg daily; Eliquis 5 mg b.i.d. The dosage and frequency of the medications were reviewed with the patient.   PAST MEDICAL HISTORY: Bradycardia; Essential hypertension; Hyperlipidemia; Presence of pacemaker.   FAMILY HISTORY: Positive for hypertension. Negative for diabetes and heart disease.   PSYCHOSOCIAL HISTORY: No history of mood changes or depression. He rarely drinks alcohol and never used tobacco.       Review of Systems  · Cardiovascular  o Denies  o : palpitations (fast, fluttering, or skipping beats), swelling (feet, ankles, hands), shortness of breath while walking or lying flat, chest pain or angina pectoris   · Respiratory  o Denies  o : chronic or frequent cough, asthma or wheezing      Vitals  Date Time BP Position Site L\R Cuff Size HR RR TEMP (F) WT  HT  BMI kg/m2 BSA m2 O2 Sat        06/16/2020 02:57 /68 Sitting    78 - R   209lbs 0oz 5'  11\" 29.15 2.18           Physical Examination  · Constitutional  o Appearance  o : Awake, alert, cooperative, pleasant.  · Respiratory  o Inspection of Chest  o : No chest wall deformities, moving equal.  o Auscultation of Lungs  o : Good air entry with vesicular breath sounds.  · Cardiovascular  o Heart  o : "   § Auscultation of Heart  § : S1 and S2 regular. No S3. No S4. No murmurs.  o Peripheral Vascular System  o :   § Extremities  § : Peripheral pulses were well felt. No edema. No cyanosis.  · Gastrointestinal  o Abdominal Examination  o : No masses or organomegaly noted.          Assessment     ASSESSMENT AND PLAN:    1.  Presence of pacemaker:  Pacemaker was checked with a  and appears to be functioning        normally.  2.  Essential hypertension controlled:  Continue current dose of Benazepril.  3.  Hyperlipidemia:  Continue current dose of Rosuvastatin, managed by his PMD.  4.  Paroxysmal atrial fibrillation:  Continue Eliquis for stroke prevention.  5.  See me back in 6 months.    Kosta Dahl MD, [facility]  YOSEF/dia           This note was transcribed by Elisha Hancock.  dia/YOSEF  The above service was transcribed by Elisah Hancock, and I attest to the accuracy of the note.  YOSEF               Electronically Signed by: Elisha Hancock-, -Author on June 19, 2020 10:09:37 AM  Electronically Co-signed by: Kosta Dahl MD -Reviewer on June 20, 2020 10:44:06 AM

## 2021-05-14 VITALS
HEIGHT: 71 IN | WEIGHT: 208 LBS | BODY MASS INDEX: 29.12 KG/M2 | DIASTOLIC BLOOD PRESSURE: 60 MMHG | HEART RATE: 81 BPM | TEMPERATURE: 97.6 F | OXYGEN SATURATION: 97 % | SYSTOLIC BLOOD PRESSURE: 134 MMHG

## 2021-05-14 VITALS
HEIGHT: 71 IN | WEIGHT: 209 LBS | HEART RATE: 64 BPM | SYSTOLIC BLOOD PRESSURE: 150 MMHG | DIASTOLIC BLOOD PRESSURE: 76 MMHG | BODY MASS INDEX: 29.26 KG/M2

## 2021-05-14 VITALS
DIASTOLIC BLOOD PRESSURE: 70 MMHG | WEIGHT: 211 LBS | HEART RATE: 88 BPM | HEIGHT: 71 IN | SYSTOLIC BLOOD PRESSURE: 154 MMHG | BODY MASS INDEX: 29.54 KG/M2

## 2021-05-14 VITALS
WEIGHT: 206 LBS | DIASTOLIC BLOOD PRESSURE: 72 MMHG | SYSTOLIC BLOOD PRESSURE: 128 MMHG | HEART RATE: 80 BPM | BODY MASS INDEX: 28.84 KG/M2 | OXYGEN SATURATION: 94 % | TEMPERATURE: 98.5 F | HEIGHT: 71 IN

## 2021-05-14 VITALS — BODY MASS INDEX: 29.47 KG/M2 | OXYGEN SATURATION: 98 % | HEIGHT: 71 IN | WEIGHT: 210.5 LBS | HEART RATE: 87 BPM

## 2021-05-14 NOTE — PROGRESS NOTES
Progress Note      Patient Name: Alexandre Alvarado   Patient ID: [id number]   Sex: Male   YOB: 1950    Primary Care Provider: Hawk Ruth III MD   Referring Provider: Hawk Ruth III MD    Visit Date: April 20, 2021    Provider: Hawk Ruth III MD   Location: [facility]   Location Address: [address] [place] [address]  [id number]   Location Phone: [phone]          Chief Complaint  · bright red blood in stool      History Of Present Illness  Alexandre Alvarado is a 71 year old /White male who presents for evaluation and treatment of: blood in stool since 4/15/2021. Pt states he has been taking ibuprofen daily for back pain. Pt says he is having epigastric pain and believes he may have caused gastric ulcer. He is on eliquis and his last dose was last evening.      HPI     patient is 71 years old has been taking a good bit of ibuprofen for his back and his knee. Pt has epigastric burning since Thursday and has had some melena stools.  No dizziness when he stands up or sits and gets up no postural hypotension.  Feels good is played [employer] and bleeding is not worse than it was a day or 2 ago.  He is to stop his Eliquis.  Last colonoscopy was normal in 2015 had 104 that was negative also.  Some epigastric burning will start him on Protonix now.  If he gets dizzy or has a good bit of bleeding to come to the emergency room.  He understands at this point time would be safer for him to be at home.  has a pacemaker is on Eliquis for atrial fib    Review of systems     cardiovascular no chest pain no palpitations  Respiratory no shortness of breath dyspnea on exertion  GI epigastric burning.     Physical exam     pulse ox 97 heart rate 81 blood pressure 134/60 temperature 97.6  General no distress  Cardiovascular regular murmur  Respiratory no increased work of breathing no rales no rhonchi no wheezes clear and equal bilaterally  Abdomen soft  nontender no pedal splenomegaly  Rectal is negative with melena.  No masses    In office procedure patient patient had a careful proctoscopy melena stool up above coming up above where the Proctosol scope can see no hemorrhoids are noted.    Assessment     #1 melena doing well now, will get scoped in the morning, take Protonix 40 daily stop and hold Eliquis Dr. Camarillo will scope.    Plan     scope with Dr. Thomas in the morning   best to stay around here till find out about his bleeding status.  I did a CBC.  Start Protonix 40 twice daily stop Eliquis       Past Medical History  Disease Name Date Onset Notes   Abdominal Aortic Aneurysm --  --    Advance directive discussed with patient 08/11/2020 --    Arthritis --  --    Back Pain  --  --    Cardiac Dysrhythmia --  --    Cerumen Impaction 08/11/2020 --    Chronic back pain 08/11/2020 --    Great toe pain, left 08/11/2020 --    Heart Attack --  --    Heart Disease --  --    High cholesterol 08/11/2020 --    Hip Pain --  --    History of colonoscopy 08/11/2020 --    Hyperlipidemia --  --    Hypertension, essential --  --    Lumbago/low back pain 10/17/2012 --    Medication management 08/11/2020 --    Medication refill 08/11/2020 --    Myocardial Infarction 2011 --    Pacemaker 08/11/2020 --    Prostatic Hypertrophy, Benign --  --    Screening for cancer; unspecified 09/10/2014 --    Seasonal allergies --  --    Shoulder pain, left 08/11/2020 --    Stroke --  --          Past Surgical History  Procedure Name Date Notes   Back --  --    Back surgery --  --    Colonoscopy --  --    heart surgery --  --    Pacemaker --  --    Pacemaker. --  --          Medication List  Name Date Started Instructions   benazepril-hydrochlorothiazide 20-12.5 mg oral tablet 11/03/2020 take 1 tablet by oral route once daily   Eliquis 5 mg oral tablet 11/03/2020 TAKE 1 TABLET BY MOUTH TWICE A DAY   rosuvastatin 20 mg oral tablet 11/03/2020 take 1 tablet (20 mg) by oral route once daily for 90  days   sildenafil 100 mg oral tablet 11/03/2020 take 1 tablet (100 mg) by oral route once daily as needed approximately 1 hour before sexual activity         Allergy List  Allergen Name Date Reaction Notes   NO KNOWN DRUG ALLERGIES --  --  --        Allergies Reconciled  Family Medical History  Disease Name Relative/Age Notes   Chronic Obstructive Pulmonary Disease Father/   --    Stroke Father/   Father   Heart Disease Father/   --    Cancer, Unspecified Father/  Mother/   Mother; Father   Diabetes, unspecified type  --    Heart failure  --    Spine Problems  --    Osteoporosis Mother/   Mother   Family history of Arthritis Mother/   Mother         Social History  Finding Status Start/Stop Quantity Notes   Active but no formal exercise --  --/-- --  --    Alcohol Current some day --/-- --  06/05/2017 - 04/19/2017 - 3-4 glasses of wine a week    Alcohol Use Current some day --/-- --  rarely drinks, less than 1 drink per day, has been drinking for 31 or more years   lives with spouse --  --/-- --  --     --  --/-- --  --    . --  --/-- --  --    Recreational Drug Use Never --/-- --  no   Retired. --  --/-- --  --    Tobacco Never --/-- --  never smoker         Immunizations  NameDate Admin Mfg Trade Name Lot Number Route Inj VIS Given VIS Publication   COVID Nppogbh6702/12/2021 MOD Moderna COVID-19 Vaccine  NE NE 04/20/2021    Comments:    COVID Gtlmvls9801/19/2021 MOD Moderna COVID-19 Vaccine  NE NE 04/20/2021    Comments:    Tmehsbwgo94/15/2020 SKB Fluarix, quadrivalent, preservative free [id number] [facility] 11/03/2020    Comments: [place]   Xocrxqlihewb70/22/2014 MSD PNEUMOVAX 23 [id number] IM RD 10/22/2014 10/06/2009   Comments:    Prevnar 1311/14/2016 WAL PREVNAR 13 [id number] IM LD 11/14/2016 11/05/2015   Comments:          Review of Systems  · Constitutional  o * See HPI  · Eyes  o * See HPI  · HENT  o * See HPI  · Breasts  o * See HPI  · Cardiovascular  o * See HPI  · Respiratory  o * See  "HPI  · Gastrointestinal  o * See HPI  · Genitourinary  o * See HPI  · Integument  o * See HPI  · Neurologic  o * See HPI  · Musculoskeletal  o * See HPI  · Endocrine  o * See HPI  · Psychiatric  o * See HPI  · Heme-Lymph  o * See HPI  · Allergic-Immunologic  o * See HPI      Vitals  Date Time BP Position Site L\R Cuff Size HR RR TEMP (F) WT  HT  BMI kg/m2 BSA m2 O2 Sat FR L/min FiO2 HC       04/20/2021 10:41 /60 Sitting    81 - R  97.6 208lbs 0oz 5'  11\" 29.01 2.17 97 %  21%                  Assessment  · Epigastric pain     789.06/R10.13  · Long term current use of anticoagulant     V58.61/Z79.01  on eliquis  · Medication management     V58.69/Z79.899  · History of gastric ulcer     V12.79/Z87.19  · Melena     578.1/K92.1  · NSAID long-term use     V58.64/Z79.1    Problems Reconciled  Plan  · Orders  o CBC with [facility] ([id number]) - V58.69/Z79.899, 789.06/R10.13, 578.1/K92.1 - 04/20/2021  o ACO-39: Current medications updated and reviewed (, 1159F) - - 04/20/2021  o Insertion of intraluminal device into rectum (94849) - 789.06/R10.13, 578.1/K92.1 - 04/20/2021  o Gastroenterology Consultation (GASTR) - 789.06/R10.13, 578.1/K92.1 - 04/20/2021  · Medications  o pantoprazole 40 mg oral tablet,delayed release (DR/EC)   SIG: take 1 tablet (40 mg) by oral route 2 times per day for 7 days   DISP: (14) Tablet with 0 refills  Prescribed on 04/20/2021     o Medications have been Reconciled  o Transition of Care or Provider Policy  · Instructions  o Instructed to seek medical attention urgently for new or worsening symptoms.  o Patient is taking medications as prescribed and doing well.   o Take all medications as prescribed/directed.  o Patient instructed/educated on their diet and exercise program.  o Patient was educated/instructed on their diagnosis, treatment and medications prior to discharge from the clinic today.  o Bring all medicines with their bottles to each office visit.  o Time spent with the patient " was 45 minutes, more than 50% face to face.  o Chronic conditions reviewed and taken into consideration for today's treatment plan.  o Discussed Covid-19 precautions including, but not limited to, social distancing, avoid touching your face, and hand washing.   · Referrals  o ID: [id number] Date: 04/20/2021 Type: Outbound  Referral To: Reginald Camarillo MD Specialty: Gastroenterology  Reason: blood in stool and epigastric pain            Electronically Signed by: Tanika Pace, -Author on April 20, 2021 01:19:44 PM  Electronically Co-signed by: Hawk Ruth III MD -Reviewer on April 20, 2021 01:50:27 PM

## 2021-05-14 NOTE — PROGRESS NOTES
"   Progress Note      Patient Name: Alexandre Alvarado   Patient ID: [id number]   Sex: Male   YOB: 1950    Primary Care Provider: Hawk Ruth III MD   Referring Provider: Hawk Ruth III MD    Visit Date: April 6, 2021    Provider: Kosta Dahl MD   Location: [facility]   Location Address: [address] [place] [address], [address]   [place], [place]  [id number]   Location Phone: [phone]          Chief Complaint     Hypertension, presence of pacemaker, hyperlipidemia.       History Of Present Illness  Alexandre Alvarado is a 71 year old /White male with a history of pacemaker implantation and sick sinus syndrome. Denies any chest pain or shortness of breath.   CURRENT MEDICATIONS: Medication list was reviewed and is as documented.   PAST MEDICAL HISTORY: Bradycardia; essential hypertension; hyperlipidemia; presence of pacemaker.   PSYCHOSOCIAL HISTORY: Rarely uses alcohol.      ALLERGIES: No known drug allergies.       Review of Systems  · Cardiovascular  o Denies  o : palpitations (fast, fluttering, or skipping beats), swelling (feet, ankles, hands), shortness of breath while walking or lying flat, chest pain or angina pectoris   · Respiratory  o Denies  o : chronic or frequent cough      Vitals  Date Time BP Position Site L\R Cuff Size HR RR TEMP (F) WT  HT  BMI kg/m2 BSA m2 O2 Sat FR L/min FiO2 HC       04/06/2021 02:52 /70 Sitting    88 - R   211lbs 0oz 5'  11\" 29.43 2.19       04/06/2021 02:52 /70 Sitting    92 - R                   Physical Examination  · Constitutional  o Appearance  o : Awake, alert, cooperative, pleasant.  · Respiratory  o Inspection of Chest  o : No chest wall deformities, moving equal.  o Auscultation of Lungs  o : Clear.  · Cardiovascular  o Heart  o :   § Auscultation of Heart  § : S1 and S2 regular. No S3. No S4.   o Peripheral Vascular System  o :   § Extremities  § : No edema.  · Gastrointestinal  o Abdominal Examination  o : No masses or " organomegaly noted.          Assessment     IMPRESSION AND PLAN:  1. Sick sinus syndrome, presence of pacemaker.  The pacemaker was checked with a  and functioning normally.    2. Essential hypertension, controlled.  Continue current dose of Benazepril.   3. Hyperlipidemia.  Continue current dose of Rosuvastatin.  Managed by PMD.  4. Paroxysmal atrial fibrillation.  Continue Eliquis for stroke prevention.    5. See me back in six months.      Kosta Dahl MD  YOSEF/lm                Electronically Signed by: Tanika Guerrero-, Other -Author on April 15, 2021 01:03:18 PM  Electronically Co-signed by: Kosta Dahl MD -Reviewer on April 29, 2021 09:46:51 AM

## 2021-05-15 VITALS
SYSTOLIC BLOOD PRESSURE: 150 MMHG | HEART RATE: 80 BPM | DIASTOLIC BLOOD PRESSURE: 74 MMHG | BODY MASS INDEX: 29.54 KG/M2 | WEIGHT: 211 LBS | HEIGHT: 71 IN

## 2021-05-15 VITALS
BODY MASS INDEX: 29.26 KG/M2 | WEIGHT: 209 LBS | DIASTOLIC BLOOD PRESSURE: 68 MMHG | SYSTOLIC BLOOD PRESSURE: 134 MMHG | HEIGHT: 71 IN | HEART RATE: 78 BPM

## 2021-05-15 VITALS
BODY MASS INDEX: 29.12 KG/M2 | WEIGHT: 208 LBS | SYSTOLIC BLOOD PRESSURE: 130 MMHG | HEART RATE: 84 BPM | HEIGHT: 71 IN | OXYGEN SATURATION: 96 % | DIASTOLIC BLOOD PRESSURE: 70 MMHG | TEMPERATURE: 97.8 F

## 2021-05-15 VITALS
HEART RATE: 78 BPM | DIASTOLIC BLOOD PRESSURE: 72 MMHG | BODY MASS INDEX: 28.98 KG/M2 | HEIGHT: 71 IN | SYSTOLIC BLOOD PRESSURE: 132 MMHG | WEIGHT: 207 LBS

## 2021-05-15 VITALS
WEIGHT: 207 LBS | BODY MASS INDEX: 28.98 KG/M2 | HEIGHT: 71 IN | SYSTOLIC BLOOD PRESSURE: 130 MMHG | DIASTOLIC BLOOD PRESSURE: 70 MMHG

## 2021-05-16 VITALS
SYSTOLIC BLOOD PRESSURE: 140 MMHG | DIASTOLIC BLOOD PRESSURE: 76 MMHG | WEIGHT: 212 LBS | HEIGHT: 71 IN | BODY MASS INDEX: 29.68 KG/M2

## 2021-05-16 VITALS
DIASTOLIC BLOOD PRESSURE: 86 MMHG | SYSTOLIC BLOOD PRESSURE: 152 MMHG | HEART RATE: 86 BPM | BODY MASS INDEX: 28.14 KG/M2 | WEIGHT: 201 LBS | HEIGHT: 71 IN

## 2021-05-16 VITALS
DIASTOLIC BLOOD PRESSURE: 72 MMHG | SYSTOLIC BLOOD PRESSURE: 132 MMHG | BODY MASS INDEX: 28 KG/M2 | HEIGHT: 71 IN | WEIGHT: 200 LBS

## 2021-06-08 ENCOUNTER — TELEPHONE (OUTPATIENT)
Dept: GASTROENTEROLOGY | Facility: CLINIC | Age: 71
End: 2021-06-08

## 2021-06-08 NOTE — TELEPHONE ENCOUNTER
Pt was scheduled for EGD on 6.22.21 to document healing of gastric ulcer. Pt called to cx that appointment and did not wish to reschedule. I went over the risk of pt not having repeat EGD. Pt aware. I have also mailed pt a letter

## 2021-06-23 RX ORDER — PANTOPRAZOLE SODIUM 40 MG/1
TABLET, DELAYED RELEASE ORAL
Qty: 30 TABLET | Refills: 1 | Status: SHIPPED | OUTPATIENT
Start: 2021-06-23 | End: 2021-07-23

## 2021-07-23 RX ORDER — PANTOPRAZOLE SODIUM 40 MG/1
TABLET, DELAYED RELEASE ORAL
Qty: 30 TABLET | Refills: 1 | Status: SHIPPED | OUTPATIENT
Start: 2021-07-23 | End: 2021-08-19

## 2021-08-09 RX ORDER — SIMVASTATIN 10 MG
10 TABLET ORAL NIGHTLY
COMMUNITY
End: 2021-08-19

## 2021-08-09 RX ORDER — BENAZEPRIL HYDROCHLORIDE AND HYDROCHLOROTHIAZIDE 20; 12.5 MG/1; MG/1
1 TABLET ORAL DAILY
COMMUNITY
End: 2021-10-20

## 2021-08-10 ENCOUNTER — HOSPITAL ENCOUNTER (OUTPATIENT)
Facility: HOSPITAL | Age: 71
Setting detail: HOSPITAL OUTPATIENT SURGERY
Discharge: HOME | End: 2021-08-10
Attending: INTERNAL MEDICINE | Admitting: INTERNAL MEDICINE

## 2021-08-10 ENCOUNTER — ANESTHESIA EVENT (OUTPATIENT)
Dept: GASTROENTEROLOGY | Facility: HOSPITAL | Age: 71
End: 2021-08-10

## 2021-08-10 ENCOUNTER — ANESTHESIA (OUTPATIENT)
Dept: GASTROENTEROLOGY | Facility: HOSPITAL | Age: 71
End: 2021-08-10

## 2021-08-10 VITALS
BODY MASS INDEX: 28.15 KG/M2 | OXYGEN SATURATION: 97 % | HEIGHT: 71 IN | HEART RATE: 59 BPM | DIASTOLIC BLOOD PRESSURE: 79 MMHG | RESPIRATION RATE: 19 BRPM | WEIGHT: 201.06 LBS | SYSTOLIC BLOOD PRESSURE: 132 MMHG | TEMPERATURE: 98.1 F

## 2021-08-10 PROCEDURE — 25010000002 PROPOFOL 10 MG/ML EMULSION: Performed by: NURSE ANESTHETIST, CERTIFIED REGISTERED

## 2021-08-10 PROCEDURE — 43235 EGD DIAGNOSTIC BRUSH WASH: CPT | Performed by: INTERNAL MEDICINE

## 2021-08-10 RX ORDER — LIDOCAINE HYDROCHLORIDE 20 MG/ML
INJECTION, SOLUTION INFILTRATION; PERINEURAL AS NEEDED
Status: DISCONTINUED | OUTPATIENT
Start: 2021-08-10 | End: 2021-08-10 | Stop reason: SURG

## 2021-08-10 RX ORDER — SODIUM CHLORIDE, SODIUM LACTATE, POTASSIUM CHLORIDE, CALCIUM CHLORIDE 600; 310; 30; 20 MG/100ML; MG/100ML; MG/100ML; MG/100ML
30 INJECTION, SOLUTION INTRAVENOUS CONTINUOUS
Status: DISCONTINUED | OUTPATIENT
Start: 2021-08-10 | End: 2021-08-10 | Stop reason: HOSPADM

## 2021-08-10 RX ADMIN — PROPOFOL 200 MCG/KG/MIN: 10 INJECTION, EMULSION INTRAVENOUS at 14:36

## 2021-08-10 RX ADMIN — SODIUM CHLORIDE, POTASSIUM CHLORIDE, SODIUM LACTATE AND CALCIUM CHLORIDE 30 ML/HR: 600; 310; 30; 20 INJECTION, SOLUTION INTRAVENOUS at 13:02

## 2021-08-10 RX ADMIN — LIDOCAINE HYDROCHLORIDE 100 MG: 20 INJECTION, SOLUTION INFILTRATION; PERINEURAL at 14:36

## 2021-08-10 NOTE — ANESTHESIA POSTPROCEDURE EVALUATION
Patient: Alexandre Alvarado    Procedure Summary     Date: 08/10/21 Room / Location: [facility] ENDOSCOPY 4 / [facility] ENDOSCOPY    Anesthesia Start: 1434 Anesthesia Stop: 1452    Procedure: ESOPHAGOGASTRODUODENOSCOPY (N/A ) Diagnosis: (GASTRIC ULCER)    Surgeons: Reginald Camarillo MD Provider: Marty Davis MD    Anesthesia Type: general ASA Status: 4          Anesthesia Type: general    Vitals  Vitals Value Taken Time   /79 08/10/21 1509   Temp 36.7 °C (98.1 °F) 08/10/21 1509   Pulse 59 08/10/21 1509   Resp 19 08/10/21 1509   SpO2 97 % 08/10/21 1509           Post Anesthesia Care and Evaluation    Patient location during evaluation: bedside  Patient participation: complete - patient participated  Level of consciousness: awake  Pain score: 0  Pain management: adequate  Airway patency: patent  Anesthetic complications: No anesthetic complications  PONV Status: none  Cardiovascular status: acceptable and stable  Respiratory status: acceptable and room air  Hydration status: acceptable    Comments: An Anesthesiologist personally participated in the most demanding procedures (including induction and emergence if applicable) in the anesthesia plan, monitored the course of anesthesia administration at frequent intervals and remained physically present and available for immediate diagnosis and treatment of emergencies.

## 2021-08-10 NOTE — ANESTHESIA PREPROCEDURE EVALUATION
Anesthesia Evaluation     Patient summary reviewed and Nursing notes reviewed                Airway   Mallampati: I  TM distance: >3 FB  Neck ROM: full  No difficulty expected  Dental      Pulmonary - negative pulmonary ROS and normal exam    breath sounds clear to auscultation  Cardiovascular - normal exam    Rhythm: regular    (+) hypertension, past MI , hyperlipidemia,       Neuro/Psych  (+) CVA, psychiatric history,     GI/Hepatic/Renal/Endo    (+)  PUD, GI bleeding ,     Musculoskeletal     (+) back pain,   Abdominal    Substance History - negative use     OB/GYN negative ob/gyn ROS         Other   arthritis,                      Anesthesia Plan    ASA 4     general     intravenous induction     Anesthetic plan, all risks, benefits, and alternatives have been provided, discussed and informed consent has been obtained with: patient.

## 2021-08-10 NOTE — H&P
Pre Procedure History & Physical    Chief Complaint:   Gastric ulcer    Subjective     HPI:   History of gastric ulcer    Past Medical History:   Past Medical History:   Diagnosis Date   • Abdominal aortic aneurysm (AAA) (CMS/[facility])    • Advance directive discussed with patient 08/11/2020   • Arthritis    • Back pain    • Cardiac dysrhythmia    • Cerumen impaction 08/11/2020   • Chronic back pain 08/11/2020   • Depression    • Diverticulosis    • Great toe pain, left 08/11/2020   • Heart attack (CMS/HCC) 2011   • Heart disease    • High cholesterol 08/11/2020   • Hip pain    • History of colonoscopy 08/11/2020   • Hyperlipidemia    • Hypertension, essential    • Lumbago 10/17/2012    low back pain   • Medication management 08/11/2020   • Medication refill 08/11/2020   • Myocardial infarction (CMS/HCC) 2011   • Pacemaker 08/11/2020   • Prostatic hypertrophy     Benign   • Rectal bleeding    • Screening for cancer 09/10/2014    unspecified   • Seasonal allergies    • Shoulder pain, left 08/11/2020   • Stomach ulcer    • Stroke (CMS/HCC) 2019       Past Surgical History:  Past Surgical History:   Procedure Laterality Date   • BACK SURGERY     • CARDIAC SURGERY     • COLONOSCOPY  2016    Dr Ponce   • ENDOSCOPY     • INSERT / REPLACE / REMOVE PACEMAKER         Family History:  Family History   Problem Relation Age of Onset   • Cancer Mother    • Osteoporosis Mother    • Arthritis Mother    • COPD Father    • Stroke Father    • Heart disease Father    • Cancer Father    • Diabetes Other    • Heart failure Other    • Other Other         spine problems   • Malig Hyperthermia Neg Hx        Social History:   reports that he has never smoked. He does not have any smokeless tobacco history on file. He reports previous alcohol use. He reports that he does not use drugs.    Medications:   Medications Prior to Admission   Medication Sig Dispense Refill Last Dose   • apixaban (ELIQUIS) 5 MG tablet tablet Take 5 mg by mouth 2 (Two)  "Times a Day.   8/9/2021 at 2300   • benazepril-hydrochlorthiazide (LOTENSIN HCT) 20-12.5 MG per tablet Take 1 tablet by mouth Daily.   8/9/2021 at Unknown time   • pantoprazole (PROTONIX) 40 MG EC tablet TAKE 1 TABLET BY MOUTH EVERY DAY 30 tablet 1 8/9/2021 at Unknown time   • simvastatin (ZOCOR) 10 MG tablet Take 10 mg by mouth Every Night.   8/9/2021 at Unknown time       Allergies:  Patient has no known allergies.        Objective     Blood pressure 141/78, pulse 55, resp. rate 20, height 180.3 cm (70.98\"), weight 91.2 kg (201 lb 1 oz), SpO2 95 %.    Physical Exam   Constitutional: Pt is oriented to person, place, and time and well-developed, well-nourished, and in no distress.   Mouth/Throat: Oropharynx is clear and moist.   Neck: Normal range of motion.   Cardiovascular: Normal rate, regular rhythm and normal heart sounds.    Pulmonary/Chest: Effort normal and breath sounds normal.   Abdominal: Soft. Nontender  Skin: Skin is warm and dry.   Psychiatric: Mood, memory, affect and judgment normal.     Assessment/Plan     Diagnosis:   gastric ulcer    Anticipated Surgical Procedure:  EGD    The risks, benefits, and alternatives of this procedure have been discussed with the patient or the responsible party- the patient understands and agrees to proceed.            "

## 2021-08-19 PROCEDURE — U0003 INFECTIOUS AGENT DETECTION BY NUCLEIC ACID (DNA OR RNA); SEVERE ACUTE RESPIRATORY SYNDROME CORONAVIRUS 2 (SARS-COV-2) (CORONAVIRUS DISEASE [COVID-19]), AMPLIFIED PROBE TECHNIQUE, MAKING USE OF HIGH THROUGHPUT TECHNOLOGIES AS DESCRIBED BY CMS-2020-01-R: HCPCS | Performed by: NURSE PRACTITIONER

## 2021-08-19 PROCEDURE — U0005 INFEC AGEN DETEC AMPLI PROBE: HCPCS | Performed by: NURSE PRACTITIONER

## 2021-08-20 RX ORDER — PANTOPRAZOLE SODIUM 20 MG/1
20 TABLET, DELAYED RELEASE ORAL DAILY
Qty: 30 TABLET | Refills: 3 | Status: SHIPPED | OUTPATIENT
Start: 2021-08-20 | End: 2021-09-10 | Stop reason: SDDI

## 2021-08-21 ENCOUNTER — TELEPHONE (OUTPATIENT)
Dept: URGENT CARE | Facility: CLINIC | Age: 71
End: 2021-08-21

## 2021-08-21 NOTE — TELEPHONE ENCOUNTER
----- Message from Anh Alcaraz MD sent at 8/21/2021  7:17 PM EDT -----  Please call the patient regarding hisnegative Covid PCR test.

## 2021-08-24 RX ORDER — APIXABAN 5 MG/1
TABLET, FILM COATED ORAL
Qty: 180 TABLET | Refills: 0 | Status: SHIPPED | OUTPATIENT
Start: 2021-08-24 | End: 2021-12-13 | Stop reason: SDUPTHER

## 2021-09-09 ENCOUNTER — OFFICE VISIT (OUTPATIENT)
Dept: FAMILY MEDICINE CLINIC | Facility: CLINIC | Age: 71
End: 2021-09-09

## 2021-09-09 ENCOUNTER — LAB (OUTPATIENT)
Dept: LAB | Facility: HOSPITAL | Age: 71
End: 2021-09-09

## 2021-09-09 VITALS
TEMPERATURE: 98.6 F | HEART RATE: 61 BPM | SYSTOLIC BLOOD PRESSURE: 136 MMHG | HEIGHT: 71 IN | WEIGHT: 197 LBS | BODY MASS INDEX: 27.58 KG/M2 | DIASTOLIC BLOOD PRESSURE: 80 MMHG | OXYGEN SATURATION: 96 %

## 2021-09-09 DIAGNOSIS — M79.676 GREAT TOE PAIN, UNSPECIFIED LATERALITY: ICD-10-CM

## 2021-09-09 DIAGNOSIS — M79.676 GREAT TOE PAIN, UNSPECIFIED LATERALITY: Primary | ICD-10-CM

## 2021-09-09 LAB — URATE SERPL-MCNC: 6.5 MG/DL (ref 3.4–7)

## 2021-09-09 PROCEDURE — 84550 ASSAY OF BLOOD/URIC ACID: CPT

## 2021-09-09 PROCEDURE — 36415 COLL VENOUS BLD VENIPUNCTURE: CPT

## 2021-09-09 PROCEDURE — 99213 OFFICE O/P EST LOW 20 MIN: CPT | Performed by: NURSE PRACTITIONER

## 2021-09-09 NOTE — PROGRESS NOTES
Follow Up Office Visit      Patient Name: Alexandre Alvarado  : 1950   MRN: [id number]     Chief Complaint:    Chief Complaint   Patient presents with   • Toe Pain       History of Present Illness: Alexandre Alvarado is a 71 y.o. male who is here today for   C/O-bilateral great toe pain for 6 months progressively worse patient states that he has tried Tylenol and topical Voltaren gel with no relief unable to take NSAIDs due to Eliquis states it started in his left toe now is in his right great toe    Subjective      Review of Systems:   Review of Systems   Constitutional: Negative for chills and fever.   Respiratory: Negative for cough.    Cardiovascular: Negative for chest pain and leg swelling.   Musculoskeletal: Positive for arthralgias.   Skin: Negative for rash.   Neurological: Negative for numbness.        Past Medical History:   Past Medical History:   Diagnosis Date   • Abdominal aortic aneurysm (AAA) (CMS/[facility])    • Advance directive discussed with patient 2020   • Arthritis    • Back pain    • Cardiac dysrhythmia    • Cerumen impaction 2020   • Chronic back pain 2020   • Depression    • Diverticulosis    • Great toe pain, left 2020   • Heart attack (CMS/[facility])    • Heart disease    • High cholesterol 2020   • Hip pain    • History of colonoscopy 2020   • Hyperlipidemia    • Hypertension, essential    • Lumbago 10/17/2012    low back pain   • Medication management 2020   • Medication refill 2020   • Myocardial infarction (CMS/[facility])    • Pacemaker 2020   • Prostatic hypertrophy     Benign   • Rectal bleeding    • Screening for cancer 09/10/2014    unspecified   • Seasonal allergies    • Shoulder pain, left 2020   • Stomach ulcer    • Stroke (CMS/[facility])        Past Surgical History:   Past Surgical History:   Procedure Laterality Date   • BACK SURGERY     • CARDIAC SURGERY     • COLONOSCOPY      Dr Ponce   • ENDOSCOPY     • ENDOSCOPY  "N/A 8/10/2021    Procedure: ESOPHAGOGASTRODUODENOSCOPY;  Surgeon: Reginald Camarillo MD;  Location: [facility] ENDOSCOPY;  Service: Gastroenterology;  Laterality: N/A;  hiatal hernia   • INSERT / REPLACE / REMOVE PACEMAKER         Family History:   Family History   Problem Relation Age of Onset   • Cancer Mother    • Osteoporosis Mother    • Arthritis Mother    • COPD Father    • Stroke Father    • Heart disease Father    • Cancer Father    • Diabetes Other    • Heart failure Other    • Other Other         spine problems   • Malig Hyperthermia Neg Hx        Social History:   Social History     Socioeconomic History   • Marital status:      Spouse name: Not on file   • Number of children: Not on file   • Years of education: Not on file   • Highest education level: Not on file   Tobacco Use   • Smoking status: Never Smoker   • Smokeless tobacco: Never Used   Substance and Sexual Activity   • Alcohol use: Not Currently     Comment: Current some day   • Drug use: Never       Medications:     Current Outpatient Medications:   •  benazepril-hydrochlorthiazide (LOTENSIN HCT) 20-12.5 MG per tablet, Take 1 tablet by mouth Daily., Disp: , Rfl:   •  [facility] Daily Fiber 0.52 g capsule, Take 0.52 g by mouth Daily., Disp: , Rfl:   •  Eliquis 5 MG tablet tablet, TAKE 1 TABLET BY MOUTH TWICE A DAY, Disp: 180 tablet, Rfl: 0  •  pantoprazole (PROTONIX) 20 MG EC tablet, Take 1 tablet by mouth Daily., Disp: 30 tablet, Rfl: 3  •  rosuvastatin (CRESTOR) 20 MG tablet, Take 20 mg by mouth Daily., Disp: , Rfl:     Allergies:   No Known Allergies      Objective     Physical Exam:  Vital Signs:   Vitals:    09/09/21 1114   BP: 136/80   Pulse: 61   Temp: 98.6 °F (37 °C)   SpO2: 96%   Weight: 89.4 kg (197 lb)   Height: 180.3 cm (71\")     Body mass index is 27.48 kg/m².     Physical Exam  Cardiovascular:      Rate and Rhythm: Normal rate and regular rhythm.      Heart sounds: Normal heart sounds. No murmur heard.     Pulmonary:      Effort: " Pulmonary effort is normal.      Breath sounds: Normal breath sounds.   Musculoskeletal:      Right lower leg: No edema.      Left lower leg: No edema.      Comments: Bilateral metatarsophalangeal joints tender to palpation, right mild swelling no erythema at this time   Skin:     General: Skin is warm and dry.   Neurological:      Mental Status: He is alert.      Gait: Gait normal.              Assessment / Plan      Assessment/Plan:   Diagnoses and all orders for this visit:    1. Great toe pain, unspecified laterality (Primary)  -     Uric Acid; Future       Great toe pain offered tramadol for pain patient declined will obtain uric acid level as the pain is specifically in the MTP joint of both great toes left getting prior to right no relief from OTC medications at this time will call with results and further instructions      Follow Up:   Return if symptoms worsen or fail to improve.    TA Javier      Please note that portions of this note may have been completed with a voice recognition program. Efforts were made to edit the dictations, but occasionally words are mistranscribed.

## 2021-10-20 RX ORDER — ROSUVASTATIN CALCIUM 20 MG/1
TABLET, COATED ORAL
Qty: 90 TABLET | Refills: 0 | Status: SHIPPED | OUTPATIENT
Start: 2021-10-20 | End: 2021-12-13 | Stop reason: SDUPTHER

## 2021-10-20 RX ORDER — BENAZEPRIL HYDROCHLORIDE AND HYDROCHLOROTHIAZIDE 20; 12.5 MG/1; MG/1
TABLET ORAL
Qty: 90 TABLET | Refills: 0 | Status: SHIPPED | OUTPATIENT
Start: 2021-10-20 | End: 2021-12-13 | Stop reason: SDUPTHER

## 2021-10-25 RX ORDER — SILDENAFIL 100 MG/1
TABLET, FILM COATED ORAL
Qty: 30 TABLET | Refills: 0 | Status: SHIPPED | OUTPATIENT
Start: 2021-10-25 | End: 2021-12-13 | Stop reason: SDUPTHER

## 2021-12-12 PROBLEM — Z95.0 PRESENCE OF PERMANENT CARDIAC PACEMAKER: Status: ACTIVE | Noted: 2021-12-12

## 2021-12-12 PROBLEM — I10 HYPERTENSION, ESSENTIAL: Status: ACTIVE | Noted: 2021-12-12

## 2021-12-12 NOTE — PROGRESS NOTES
[facility]  Cardiology progress Note    Patient Name: Alexandre Alvarado  : 1950    CHIEF COMPLAINT  Hypertension, atrial fibrillation      Subjective   Subjective     HISTORY OF PRESENT ILLNESS    Alexandre Alvarado is a 71 y.o. male with history of sick sinus syndrome status post permanent pacemaker, hypertension and atrial fibrillation.  No chest pain or shortness of breath.  No palpitations.    Review of Systems:   Constitutional no fever,  no weight loss   Skin no rash   Otolaryngeal no difficulty swallowing   Cardiovascular See HPI   Pulmonary no cough, no sputum production   Gastrointestinal no constipation, no diarrhea   Genitourinary no dysuria, no hematuria   Hematologic no easy bruisability, no abnormal bleeding   Musculoskeletal no muscle pain   Neurologic no dizziness, no falls         Personal History     Social History:  reports that he has never smoked. He has never used smokeless tobacco. He reports previous alcohol use of about 1.0 standard drink of alcohol per week. He reports that he does not use drugs.    Home Medications:  Current Outpatient Medications on File Prior to Visit   Medication Sig   • apixaban (Eliquis) 5 MG tablet tablet Take 1 tablet by mouth 2 (Two) Times a Day.   • benazepril-hydrochlorthiazide (LOTENSIN HCT) 20-12.5 MG per tablet Take 1 tablet by mouth Daily.   • rosuvastatin (CRESTOR) 20 MG tablet Take 1 tablet by mouth Daily.   • sildenafil (VIAGRA) 100 MG tablet Take 1 tablet by mouth As Needed for Erectile Dysfunction.   • [DISCONTINUED] [facility] Daily Fiber 0.52 g capsule Take 0.52 g by mouth Daily.     No current facility-administered medications on file prior to visit.     Allergies:  No Known Allergies    Objective    Objective       Vitals:   Temp:  [97.8 °F (36.6 °C)] 97.8 °F (36.6 °C)  Heart Rate:  [70-80] 80  BP: (128-138)/(70-78) 138/78  Body mass index is 28.03 kg/m².     Physical Exam:   Constitutional: Awake, alert, No acute distress    Eyes: PERRLA,  sclerae anicteric, no conjunctival injection   HENT: NCAT, mucous membranes moist   Neck: Supple, no thyromegaly, no lymphadenopathy, trachea midline   Respiratory: Clear to auscultation bilaterally, nonlabored respirations    Cardiovascular: RRR, no murmurs or rubs. Palpable pedal pulses bilaterally   Musculoskeletal: No bilateral ankle edema, no cyanosis to extremities   Psychiatric: Appropriate affect, cooperative   Neurologic: Oriented x 3, strength symmetric in all extremities, Cranial Nerves grossly intact to confrontation, speech clear   Skin: No rashes.    Result Review    Result Review:  I have personally reviewed the available results from  [x]  Laboratory  [x]  EKG  [x]  Cardiology  [x]  Medications  [x]  Old records  []  Other:   Procedures  Lab Results   Component Value Date    CHOL 122 12/13/2021     Lab Results   Component Value Date    TRIG 84 12/13/2021    TRIG 92 11/03/2020     Lab Results   Component Value Date    HDL 53 12/13/2021    HDL 41 11/03/2020     Lab Results   Component Value Date    LDL 53 12/13/2021    LDL 55 (L) 11/03/2020     Lab Results   Component Value Date    VLDL 16 12/13/2021    VLDL 18 11/03/2020        Impression/Plan:  1.  Sick sinus syndrome status post permanent pacemaker: Pacemaker was checked with the  appears to be functioning normally  2.  Paroxysmal atrial fibrillation: Continue Eliquis 5 mg twice daily for stroke prevention.  Able to tolerate Eliquis without any side effects such as bleeding.  3.  Essential hypertension controlled: Continue Lotensin/hydrochlorothiazide 20/12.5 mg once a day.  Blood pressure well controlled at home.  4.  Hyperlipidemia: Continue Crestor 20 mg once a day.  Lipid profile reviewed.           Kosta Dahl MD   12/14/21   14:35 EST

## 2021-12-13 ENCOUNTER — OFFICE VISIT (OUTPATIENT)
Dept: FAMILY MEDICINE CLINIC | Facility: CLINIC | Age: 71
End: 2021-12-13

## 2021-12-13 VITALS
DIASTOLIC BLOOD PRESSURE: 70 MMHG | BODY MASS INDEX: 28 KG/M2 | SYSTOLIC BLOOD PRESSURE: 128 MMHG | OXYGEN SATURATION: 98 % | HEIGHT: 71 IN | HEART RATE: 70 BPM | TEMPERATURE: 97.8 F | WEIGHT: 200 LBS

## 2021-12-13 DIAGNOSIS — Z79.899 MEDICATION MANAGEMENT: ICD-10-CM

## 2021-12-13 DIAGNOSIS — H61.22 IMPACTED CERUMEN OF LEFT EAR: ICD-10-CM

## 2021-12-13 DIAGNOSIS — I10 HYPERTENSION, ESSENTIAL: Primary | ICD-10-CM

## 2021-12-13 DIAGNOSIS — J30.2 SEASONAL ALLERGIES: ICD-10-CM

## 2021-12-13 DIAGNOSIS — E78.2 MIXED HYPERLIPIDEMIA: ICD-10-CM

## 2021-12-13 DIAGNOSIS — Z00.00 ROUTINE ADULT HEALTH MAINTENANCE: ICD-10-CM

## 2021-12-13 DIAGNOSIS — Z95.0 PACEMAKER: ICD-10-CM

## 2021-12-13 DIAGNOSIS — Z13.220 ENCOUNTER FOR LIPID SCREENING FOR CARDIOVASCULAR DISEASE: ICD-10-CM

## 2021-12-13 DIAGNOSIS — Z12.5 PROSTATE CANCER SCREENING: ICD-10-CM

## 2021-12-13 DIAGNOSIS — N40.0 PROSTATIC HYPERTROPHY: ICD-10-CM

## 2021-12-13 DIAGNOSIS — Z13.6 ENCOUNTER FOR LIPID SCREENING FOR CARDIOVASCULAR DISEASE: ICD-10-CM

## 2021-12-13 PROBLEM — I48.11 LONGSTANDING PERSISTENT ATRIAL FIBRILLATION: Status: ACTIVE | Noted: 2021-12-13

## 2021-12-13 PROBLEM — T39.395A GASTRIC ULCER DUE TO NONSTEROIDAL ANTIINFLAMMATORY DRUG (NSAID) THERAPY: Status: ACTIVE | Noted: 2021-12-13

## 2021-12-13 PROBLEM — K25.9 GASTRIC ULCER DUE TO NONSTEROIDAL ANTIINFLAMMATORY DRUG (NSAID) THERAPY: Status: ACTIVE | Noted: 2021-12-13

## 2021-12-13 LAB
ALBUMIN SERPL-MCNC: 5 G/DL (ref 3.5–5.2)
ALBUMIN/GLOB SERPL: 1.7 G/DL
ALP SERPL-CCNC: 66 U/L (ref 39–117)
ALT SERPL W P-5'-P-CCNC: 16 U/L (ref 1–41)
ANION GAP SERPL CALCULATED.3IONS-SCNC: 8 MMOL/L (ref 5–15)
AST SERPL-CCNC: 20 U/L (ref 1–40)
BASOPHILS # BLD AUTO: 0.04 10*3/MM3 (ref 0–0.2)
BASOPHILS NFR BLD AUTO: 0.4 % (ref 0–1.5)
BILIRUB BLD-MCNC: NEGATIVE MG/DL
BILIRUB SERPL-MCNC: 0.4 MG/DL (ref 0–1.2)
BUN SERPL-MCNC: 15 MG/DL (ref 8–23)
BUN/CREAT SERPL: 17.9 (ref 7–25)
CALCIUM SPEC-SCNC: 9.6 MG/DL (ref 8.6–10.5)
CHLORIDE SERPL-SCNC: 101 MMOL/L (ref 98–107)
CHOLEST SERPL-MCNC: 122 MG/DL (ref 0–200)
CLARITY, POC: CLEAR
CO2 SERPL-SCNC: 29 MMOL/L (ref 22–29)
COLOR UR: YELLOW
CREAT SERPL-MCNC: 0.84 MG/DL (ref 0.76–1.27)
DEPRECATED RDW RBC AUTO: 41.9 FL (ref 37–54)
EOSINOPHIL # BLD AUTO: 0.04 10*3/MM3 (ref 0–0.4)
EOSINOPHIL NFR BLD AUTO: 0.4 % (ref 0.3–6.2)
ERYTHROCYTE [DISTWIDTH] IN BLOOD BY AUTOMATED COUNT: 12.6 % (ref 12.3–15.4)
EXPIRATION DATE: NORMAL
GFR SERPL CREATININE-BSD FRML MDRD: 90 ML/MIN/1.73
GLOBULIN UR ELPH-MCNC: 3 GM/DL
GLUCOSE SERPL-MCNC: 105 MG/DL (ref 65–99)
GLUCOSE UR STRIP-MCNC: NEGATIVE MG/DL
HCT VFR BLD AUTO: 46.1 % (ref 37.5–51)
HDLC SERPL-MCNC: 53 MG/DL (ref 40–60)
HGB BLD-MCNC: 15.1 G/DL (ref 13–17.7)
IMM GRANULOCYTES # BLD AUTO: 0.03 10*3/MM3 (ref 0–0.05)
IMM GRANULOCYTES NFR BLD AUTO: 0.3 % (ref 0–0.5)
KETONES UR QL: NEGATIVE
LDLC SERPL CALC-MCNC: 53 MG/DL (ref 0–100)
LDLC/HDLC SERPL: 0.98 {RATIO}
LEUKOCYTE EST, POC: NEGATIVE
LYMPHOCYTES # BLD AUTO: 3.04 10*3/MM3 (ref 0.7–3.1)
LYMPHOCYTES NFR BLD AUTO: 28 % (ref 19.6–45.3)
Lab: NORMAL
MCH RBC QN AUTO: 30.1 PG (ref 26.6–33)
MCHC RBC AUTO-ENTMCNC: 32.8 G/DL (ref 31.5–35.7)
MCV RBC AUTO: 91.8 FL (ref 79–97)
MONOCYTES # BLD AUTO: 0.91 10*3/MM3 (ref 0.1–0.9)
MONOCYTES NFR BLD AUTO: 8.4 % (ref 5–12)
NEUTROPHILS NFR BLD AUTO: 6.81 10*3/MM3 (ref 1.7–7)
NEUTROPHILS NFR BLD AUTO: 62.5 % (ref 42.7–76)
NITRITE UR-MCNC: NEGATIVE MG/ML
NRBC BLD AUTO-RTO: 0.1 /100 WBC (ref 0–0.2)
PH UR: 5.5 [PH] (ref 5–8)
PLATELET # BLD AUTO: 335 10*3/MM3 (ref 140–450)
PMV BLD AUTO: 10.2 FL (ref 6–12)
POTASSIUM SERPL-SCNC: 4 MMOL/L (ref 3.5–5.2)
PROT SERPL-MCNC: 8 G/DL (ref 6–8.5)
PROT UR STRIP-MCNC: NEGATIVE MG/DL
PSA SERPL-MCNC: 2.23 NG/ML (ref 0–4)
RBC # BLD AUTO: 5.02 10*6/MM3 (ref 4.14–5.8)
RBC # UR STRIP: NEGATIVE /UL
SODIUM SERPL-SCNC: 138 MMOL/L (ref 136–145)
SP GR UR: 1.01 (ref 1–1.03)
TRIGL SERPL-MCNC: 84 MG/DL (ref 0–150)
TSH SERPL DL<=0.05 MIU/L-ACNC: 2.72 UIU/ML (ref 0.27–4.2)
UROBILINOGEN UR QL: NORMAL
VLDLC SERPL-MCNC: 16 MG/DL (ref 5–40)
WBC NRBC COR # BLD: 10.87 10*3/MM3 (ref 3.4–10.8)

## 2021-12-13 PROCEDURE — 81003 URINALYSIS AUTO W/O SCOPE: CPT | Performed by: FAMILY MEDICINE

## 2021-12-13 PROCEDURE — 69210 REMOVE IMPACTED EAR WAX UNI: CPT | Performed by: FAMILY MEDICINE

## 2021-12-13 PROCEDURE — 1159F MED LIST DOCD IN RCRD: CPT | Performed by: FAMILY MEDICINE

## 2021-12-13 PROCEDURE — G0103 PSA SCREENING: HCPCS | Performed by: FAMILY MEDICINE

## 2021-12-13 PROCEDURE — 1170F FXNL STATUS ASSESSED: CPT | Performed by: FAMILY MEDICINE

## 2021-12-13 PROCEDURE — G0439 PPPS, SUBSEQ VISIT: HCPCS | Performed by: FAMILY MEDICINE

## 2021-12-13 PROCEDURE — 80053 COMPREHEN METABOLIC PANEL: CPT | Performed by: FAMILY MEDICINE

## 2021-12-13 PROCEDURE — 36415 COLL VENOUS BLD VENIPUNCTURE: CPT | Performed by: FAMILY MEDICINE

## 2021-12-13 PROCEDURE — 99214 OFFICE O/P EST MOD 30 MIN: CPT | Performed by: FAMILY MEDICINE

## 2021-12-13 PROCEDURE — 80061 LIPID PANEL: CPT | Performed by: FAMILY MEDICINE

## 2021-12-13 PROCEDURE — 85025 COMPLETE CBC W/AUTO DIFF WBC: CPT | Performed by: FAMILY MEDICINE

## 2021-12-13 PROCEDURE — 84443 ASSAY THYROID STIM HORMONE: CPT | Performed by: FAMILY MEDICINE

## 2021-12-13 RX ORDER — BENAZEPRIL HYDROCHLORIDE AND HYDROCHLOROTHIAZIDE 20; 12.5 MG/1; MG/1
1 TABLET ORAL DAILY
Qty: 90 TABLET | Refills: 3 | Status: SHIPPED | OUTPATIENT
Start: 2021-12-13 | End: 2023-02-07 | Stop reason: SDUPTHER

## 2021-12-13 RX ORDER — SILDENAFIL 100 MG/1
100 TABLET, FILM COATED ORAL AS NEEDED
Qty: 30 TABLET | Refills: 5 | Status: SHIPPED | OUTPATIENT
Start: 2021-12-13 | End: 2021-12-14 | Stop reason: SDUPTHER

## 2021-12-13 RX ORDER — ROSUVASTATIN CALCIUM 20 MG/1
20 TABLET, COATED ORAL DAILY
Qty: 90 TABLET | Refills: 3 | Status: SHIPPED | OUTPATIENT
Start: 2021-12-13 | End: 2023-02-07 | Stop reason: SDUPTHER

## 2021-12-13 NOTE — PROGRESS NOTES
The ABCs of the Annual Wellness Visit  Initial Medicare Wellness Visit    Subjective   History of Present Illness:  Alexandre Alvarado is a 71 y.o. male who presents for an Initial Medicare Wellness Visit.    The following portions of the patient's history were reviewed and   updated as appropriate:   He  has a past medical history of Abdominal aortic aneurysm (AAA) (HCC), Advance directive discussed with patient (08/11/2020), Arthritis, Back pain, Cardiac dysrhythmia, Cerumen impaction (08/11/2020), Chronic back pain (08/11/2020), Depression, Diverticulosis, Great toe pain, left (08/11/2020), Heart attack ([facility]) (2011), Heart disease, High cholesterol (08/11/2020), Hip pain, History of colonoscopy (08/11/2020), Hyperlipidemia, Hypertension, essential, Lumbago (10/17/2012), Medication management (08/11/2020), Medication refill (08/11/2020), Myocardial infarction ([facility]) (2011), Pacemaker (08/11/2020), Prostatic hypertrophy, Rectal bleeding, Screening for cancer (09/10/2014), Seasonal allergies, Shoulder pain, left (08/11/2020), Stomach ulcer, and Stroke ([facility]) (2019).  He does not have any pertinent problems on file.  He  has a past surgical history that includes Colonoscopy (2016); Insert / replace / remove pacemaker; Back surgery; Cardiac surgery; Esophagogastroduodenoscopy; Esophagogastroduodenoscopy (N/A, 8/10/2021); Cardiac catheterization (Nov2011); and Spine surgery (1992 1998 2000).  His family history includes Arthritis in his mother; COPD in his father; Cancer in his father and mother; Diabetes in an other family member; Heart disease in his father; Heart failure in an other family member; Osteoporosis in his mother; Other in an other family member; Stroke in his father.  He  reports that he has never smoked. He has never used smokeless tobacco. He reports previous alcohol use of about 1.0 standard drink of alcohol per week. He reports that he does not use drugs.  Current Outpatient Medications   Medication Sig  Dispense Refill   • apixaban (Eliquis) 5 MG tablet tablet Take 1 tablet by mouth 2 (Two) Times a Day. 180 tablet 3   • benazepril-hydrochlorthiazide (LOTENSIN HCT) 20-12.5 MG per tablet Take 1 tablet by mouth Daily. 90 tablet 3   • rosuvastatin (CRESTOR) 20 MG tablet Take 1 tablet by mouth Daily. 90 tablet 3   • sildenafil (VIAGRA) 100 MG tablet Take 1 tablet by mouth As Needed for Erectile Dysfunction. 30 tablet 5   • [facility] Daily Fiber 0.52 g capsule Take 0.52 g by mouth Daily.       No current facility-administered medications for this visit.     Current Outpatient Medications on File Prior to Visit   Medication Sig   • [DISCONTINUED] benazepril-hydrochlorthiazide (LOTENSIN HCT) 20-12.5 MG per tablet TAKE 1 TABLET BY MOUTH EVERY DAY   • [DISCONTINUED] Eliquis 5 MG tablet tablet TAKE 1 TABLET BY MOUTH TWICE A DAY   • [DISCONTINUED] rosuvastatin (CRESTOR) 20 MG tablet TAKE 1 TABLET BY MOUTH EVERY DAY   • [DISCONTINUED] sildenafil (VIAGRA) 100 MG tablet TAKE ONE TABLET BY MOUTH DAILY AS NEEDED APPROXIMATELY ONE HOUR PRIOR TO SEXUAL ACTIVITY   • [facility] Daily Fiber 0.52 g capsule Take 0.52 g by mouth Daily.     No current facility-administered medications on file prior to visit.     He has No Known Allergies..     Compared to one year ago, the patient feels his physical   health is better.    Compared to one year ago, the patient feels his mental   health is the same.    Recent Hospitalizations:  He was not admitted to the hospital during the last year.       Current Medical Providers:  Patient Care Team:  Hawk Ruth III, MD as PCP - General (Family Medicine)    Outpatient Medications Prior to Visit   Medication Sig Dispense Refill   • benazepril-hydrochlorthiazide (LOTENSIN HCT) 20-12.5 MG per tablet TAKE 1 TABLET BY MOUTH EVERY DAY 90 tablet 0   • Eliquis 5 MG tablet tablet TAKE 1 TABLET BY MOUTH TWICE A  tablet 0   • rosuvastatin (CRESTOR) 20 MG tablet TAKE 1 TABLET BY MOUTH EVERY DAY 90 tablet 0   •  "sildenafil (VIAGRA) 100 MG tablet TAKE ONE TABLET BY MOUTH DAILY AS NEEDED APPROXIMATELY ONE HOUR PRIOR TO SEXUAL ACTIVITY 30 tablet 0   • [facility] Daily Fiber 0.52 g capsule Take 0.52 g by mouth Daily.       No facility-administered medications prior to visit.       No opioid medication identified on active medication list. I have reviewed chart for other potential  high risk medication/s and harmful drug interactions in the elderly.          Aspirin is not on active medication list.  Aspirin use is not indicated based on review of current medical condition/s. Risk of harm outweighs potential benefits.  .    Patient Active Problem List   Diagnosis   • Hypertension, essential   • Presence of permanent cardiac pacemaker   • Seasonal allergies   • Prostatic hypertrophy   • Pacemaker   • Medication management   • Hyperlipidemia   • Longstanding persistent atrial fibrillation (HCC)   • Gastric ulcer due to nonsteroidal antiinflammatory drug (NSAID) therapy     Advance Care Planning  Advance Directive is on file.  ACP discussion was held with the patient during this visit. Patient has an advance directive in EMR which is still valid.     REVIEW OF SYSTEMS  Cardiovascular gets pacemaker checked every 6 months usually not using it no chest pain no palpitations patient is on Eliquis for intermittent atrial fib had pacemaker for complete heart block  Respiratory no shortness of breath no dyspnea exertion    GI previous gastric ulcer due to NSAIDs had a biopsy on colonoscopy in 2021 pathology did not show any polyps not due till 2031 therefore be aged out of the screening  Musculoskeletal some pain in the left knee now better has chronic back pain which has not bothered him as much as usual although it is still aggravation  Skin no lesions    Objective       Vitals:    12/13/21 1548   BP: 128/70   Pulse: 70   Temp: 97.8 °F (36.6 °C)   SpO2: 98%   Weight: 90.7 kg (200 lb)   Height: 180.3 cm (71\")     BMI Readings from Last 1 " Encounters:   12/13/21 27.89 kg/m²   BMI is above normal parameters. Recommendations include: exercise counseling    Does the patient have evidence of cognitive impairment? No    PHYSICAL EXAM  General no distress  HEENT sclera conjunctiva clear right TM is negative left ear has a complete cerumen impaction no oral lesions  Neck no adenopathy no thyromegaly  Cardiovascular regular rhythm with some irregular beats no murmur  Respiratory lungs clear and equal bilaterally  Abdomen soft nontender no hepatosplenomegaly  Skin numerous seborrheic keratosis no cancerous or precancerous lesions  From rectal patient refuses rectal this exam  Neurologic mentation is normal speech is normal gait is normal     In office procedure the left ear canal was lavaged out was dried and cleaned and inspected by me patient tolerated the procedure well  HEALTH RISK ASSESSMENT    Smoking Status:  Social History     Tobacco Use   Smoking Status Never Smoker   Smokeless Tobacco Never Used     Alcohol Consumption:  Social History     Substance and Sexual Activity   Alcohol Use Not Currently   • Alcohol/week: 1.0 standard drink   • Types: 1 Glasses of wine per week    Comment: Current some day     Fall Risk Screen:    BRENDON Fall Risk Assessment was completed, and patient is at LOW risk for falls.Assessment completed on:12/13/2021    Depression Screen:   PHQ-2/PHQ-9 Depression Screening 12/13/2021   Little interest or pleasure in doing things 0   Feeling down, depressed, or hopeless 0   Total Score 0       Health Habits and Functional and Cognitive Screening:  Functional & Cognitive Status 12/13/2021   Do you have difficulty preparing food and eating? No   Do you have difficulty bathing yourself, getting dressed or grooming yourself? No   Do you have difficulty using the toilet? No   Do you have difficulty moving around from place to place? No   Do you have trouble with steps or getting out of a bed or a chair? No   Current Diet Well Balanced  Diet   Dental Exam Up to date   Eye Exam Up to date   Exercise (times per week) 5 times per week   Current Exercises Include Weightlifting;Pickleball;Walking   Do you need help using the phone?  No   Are you deaf or do you have serious difficulty hearing?  Yes   Do you need help with transportation? No   Do you need help shopping? No   Do you need help preparing meals?  No   Do you need help with housework?  No   Do you need help with laundry? No   Do you need help taking your medications? No   Do you need help managing money? No   Do you ever drive or ride in a car without wearing a seat belt? No   Have you felt unusual stress, anger or loneliness in the last month? No   Who do you live with? Spouse   If you need help, do you have trouble finding someone available to you? No   Have you been bothered in the last four weeks by sexual problems? No   Do you have difficulty concentrating, remembering or making decisions? No       Age-appropriate Screening Schedule:  Refer to the list below for future screening recommendations based on patient's age, sex and/or medical conditions. Orders for these recommended tests are listed in the plan section. The patient has been provided with a written plan.    Health Maintenance   Topic Date Due   • TDAP/TD VACCINES (1 - Tdap) Never done   • ZOSTER VACCINE (2 of 3) 12/30/2009   • LIPID PANEL  11/03/2021   • INFLUENZA VACCINE  Completed            CMS Preventative Services Quick Reference  Risk Factors Identified During Encounter  None Identified  The above risks/problems have been discussed with the patient.  Follow up actions/plans if indicated are seen below in the Assessment/Plan Section.  Pertinent information has been shared with the patient in the After Visit Summary.      Follow Up:  No follow-ups on file.     An After Visit Summary and PPPS were made available to the patient.      I spent 45 minutes caring for Alexandre on this date of service. This time includes time spent by  me in the following activities:preparing for the visit, reviewing tests, obtaining and/or reviewing a separately obtained history, performing a medically appropriate examination and/or evaluation , counseling and educating the patient/family/caregiver, ordering medications, tests, or procedures, referring and communicating with other health care professionals , documenting information in the medical record, independently interpreting results and communicating that information with the patient/family/caregiver and care coordination    _________________________________________________________________________________________________--  Evaluation & Management    Chief Complaint  Annual Exam (physical exam), Med Management, Knee Pain (left knee), and Leg Pain (left )    SUBJECTIVE  Alexandre Necessary presents to [facility]  71-year-old history of pacemaker hypertension intermittent atrial fib gastric ulcer due to NSAIDs BPH having few problems    PAST MEDICAL HISTORY  No Known Allergies     Past Surgical History:   • BACK SURGERY   • CARDIAC CATHETERIZATION   • CARDIAC SURGERY   • COLONOSCOPY    Dr Ponce   • ENDOSCOPY   • ENDOSCOPY    Procedure: ESOPHAGOGASTRODUODENOSCOPY;  Surgeon: Reginald Camarillo MD;  Location: [facility] ENDOSCOPY;  Service: Gastroenterology;  Laterality: N/A;  hiatal hernia   • INSERT / REPLACE / REMOVE PACEMAKER   • SPINE SURGERY       Social History     Tobacco Use   • Smoking status: Never Smoker   • Smokeless tobacco: Never Used   Substance Use Topics   • Alcohol use: Not Currently     Alcohol/week: 1.0 standard drink     Types: 1 Glasses of wine per week     Comment: Current some day       Family History   Problem Relation Age of Onset   • Cancer Mother    • Osteoporosis Mother    • Arthritis Mother    • COPD Father    • Stroke Father    • Heart disease Father    • Cancer Father    • Diabetes Other    • Heart failure Other    • Other Other         spine problems    • Malig Hyperthermia Neg Hx         Health Maintenance Due   Topic Date Due   • TDAP/TD VACCINES (1 - Tdap) Never done   • ZOSTER VACCINE (2 of 3) 12/30/2009   • LIPID PANEL  11/03/2021      Last Completed Colonoscopy          COLORECTAL CANCER SCREENING (COLONOSCOPY - Every 10 Years) Next due on 5/12/2031 05/12/2021  [place] COL[place] FLX W/RMVL OF TUMOR POLYP LESION SNARE TQ    02/03/2015  [place] COL[place] CA SCRN NOT HI RSK IND                  ASSESSMENT & PLAN  Diagnoses and all orders for this visit:    1. Hypertension, essential (Primary)  -     CBC & Differential  -     Lipid Panel  -     TSH  -     Comprehensive Metabolic Panel  -     POC Urinalysis Dipstick, Automated  -     PSA Screen    2. Encounter for lipid screening for cardiovascular disease  -     CBC & Differential  -     Lipid Panel  -     TSH  -     Comprehensive Metabolic Panel  -     POC Urinalysis Dipstick, Automated  -     PSA Screen    3. Prostate cancer screening  -     PSA Screen    4. Medication management  -     CBC & Differential  -     Lipid Panel  -     TSH  -     Comprehensive Metabolic Panel  -     POC Urinalysis Dipstick, Automated  -     PSA Screen    5. Mixed hyperlipidemia  -     CBC & Differential  -     Lipid Panel  -     TSH  -     Comprehensive Metabolic Panel  -     POC Urinalysis Dipstick, Automated  -     PSA Screen    6. Pacemaker  -     CBC & Differential  -     Lipid Panel  -     TSH  -     Comprehensive Metabolic Panel  -     POC Urinalysis Dipstick, Automated  -     PSA Screen    7. Prostatic hypertrophy  -     CBC & Differential  -     Lipid Panel  -     TSH  -     Comprehensive Metabolic Panel  -     POC Urinalysis Dipstick, Automated  -     PSA Screen    8. Seasonal allergies  -     CBC & Differential  -     Lipid Panel  -     TSH  -     Comprehensive Metabolic Panel  -     POC Urinalysis Dipstick, Automated  -     PSA Screen    9. Routine adult health maintenance  -     CBC & Differential  -      Lipid Panel  -     TSH  -     Comprehensive Metabolic Panel  -     POC Urinalysis Dipstick, Automated  -     PSA Screen    Other orders  -     benazepril-hydrochlorthiazide (LOTENSIN HCT) 20-12.5 MG per tablet; Take 1 tablet by mouth Daily.  Dispense: 90 tablet; Refill: 3  -     apixaban (Eliquis) 5 MG tablet tablet; Take 1 tablet by mouth 2 (Two) Times a Day.  Dispense: 180 tablet; Refill: 3  -     rosuvastatin (CRESTOR) 20 MG tablet; Take 1 tablet by mouth Daily.  Dispense: 90 tablet; Refill: 3  -     sildenafil (VIAGRA) 100 MG tablet; Take 1 tablet by mouth As Needed for Erectile Dysfunction.  Dispense: 30 tablet; Refill: 5  -     Ear Cerumen Removal          Ear Cerumen Removal    Date/Time: 12/13/2021 4:39 PM  Performed by: Hawk Ruth III, MD  Authorized by: Hawk Ruth III, MD     Anesthesia:  Local Anesthetic: none  Location details: left ear and right ear  Patient tolerance: patient tolerated the procedure well with no immediate complications  Procedure type: instrumentation, irrigation, curette, curved hook   Sedation:  Patient sedated: no               Patient was given instructions and counseling regarding his condition or for health maintenance advice. Please see specific information pulled into the AVS if appropriate.

## 2021-12-14 ENCOUNTER — OFFICE VISIT (OUTPATIENT)
Dept: CARDIOLOGY | Facility: CLINIC | Age: 71
End: 2021-12-14

## 2021-12-14 ENCOUNTER — CLINICAL SUPPORT NO REQUIREMENTS (OUTPATIENT)
Dept: CARDIOLOGY | Facility: CLINIC | Age: 71
End: 2021-12-14

## 2021-12-14 VITALS
BODY MASS INDEX: 28.14 KG/M2 | HEIGHT: 71 IN | DIASTOLIC BLOOD PRESSURE: 78 MMHG | WEIGHT: 201 LBS | HEART RATE: 80 BPM | SYSTOLIC BLOOD PRESSURE: 138 MMHG

## 2021-12-14 DIAGNOSIS — I49.5 SSS (SICK SINUS SYNDROME) (HCC): ICD-10-CM

## 2021-12-14 DIAGNOSIS — Z95.0 PRESENCE OF PERMANENT CARDIAC PACEMAKER: ICD-10-CM

## 2021-12-14 DIAGNOSIS — Z95.0 PACEMAKER: Primary | ICD-10-CM

## 2021-12-14 DIAGNOSIS — E78.2 HYPERLIPEMIA, MIXED: ICD-10-CM

## 2021-12-14 DIAGNOSIS — I48.0 PAROXYSMAL ATRIAL FIBRILLATION (HCC): ICD-10-CM

## 2021-12-14 DIAGNOSIS — I10 HYPERTENSION, ESSENTIAL: Primary | ICD-10-CM

## 2021-12-14 PROCEDURE — 93279 PRGRMG DEV EVAL PM/LDLS PM: CPT | Performed by: SPECIALIST

## 2021-12-14 PROCEDURE — 99214 OFFICE O/P EST MOD 30 MIN: CPT | Performed by: SPECIALIST

## 2021-12-14 RX ORDER — SILDENAFIL 100 MG/1
100 TABLET, FILM COATED ORAL AS NEEDED
Qty: 30 TABLET | Refills: 5 | Status: SHIPPED | OUTPATIENT
Start: 2021-12-14 | End: 2023-01-06

## 2021-12-14 NOTE — PROGRESS NOTES
Pt called and needs the Viagra rx sent to [facility] at the [facility] [address], it was sent to [facility].

## 2021-12-14 NOTE — PROGRESS NOTES
Normal VVI Chamber Pacemaker device interrogation.  Normal evaluation of device function and lead measurements. I turned off nicole measurements and turned off minute ventilation because patient is very sensitive to features running automatically optimization was needed of parameters or maximization of device longevity.  Patient is on automated Home Remote Monitoring.

## 2022-08-15 NOTE — PROGRESS NOTES
[facility]  Cardiology progress Note    Patient Name: Alexandre Alvarado  : 1950    CHIEF COMPLAINT  Sick sinus syndrome        Subjective   Subjective     HISTORY OF PRESENT ILLNESS    Alexandre Alvarado is a 72 y.o. male with sick sinus syndrome status post permanent pacemaker.  No chest pain no palpitations    REVIEW OF SYSTEMS    Constitutional:    No fever, no weight loss  Skin:     No rash  Otolaryngeal:    No difficulty swallowing  Cardiovascular:  No chest pain or shortness of breath  Pulmonary:    No cough, no sputum production    Personal History     Social History:    reports that he has never smoked. He has never used smokeless tobacco. He reports previous alcohol use of about 1.0 standard drink of alcohol per week. He reports that he does not use drugs.    Home Medications:  Current Outpatient Medications on File Prior to Visit   Medication Sig   • apixaban (Eliquis) 5 MG tablet tablet Take 1 tablet by mouth 2 (Two) Times a Day.   • benazepril-hydrochlorthiazide (LOTENSIN HCT) 20-12.5 MG per tablet Take 1 tablet by mouth Daily.   • rosuvastatin (CRESTOR) 20 MG tablet Take 1 tablet by mouth Daily.   • sildenafil (VIAGRA) 100 MG tablet Take 1 tablet by mouth As Needed for Erectile Dysfunction.     No current facility-administered medications on file prior to visit.       Past Medical History:   Diagnosis Date   • Abdominal aortic aneurysm (AAA) (HCC)    • Advance directive discussed with patient 2020   • Arthritis    • Back pain    • Cardiac dysrhythmia    • Cerumen impaction 2020   • Chronic back pain 2020   • Depression    • Diverticulosis    • Great toe pain, left 2020   • Heart attack (HCC)    • Heart disease    • High cholesterol 2020   • Hip pain    • History of colonoscopy 2020   • Hyperlipidemia    • Hypertension, essential    • Lumbago 10/17/2012    low back pain   • Medication management 2020   • Medication refill 2020   •  Myocardial infarction (HCC) 2011   • Pacemaker 08/11/2020   • Prostatic hypertrophy     Benign   • Rectal bleeding    • Screening for cancer 09/10/2014    unspecified   • Seasonal allergies    • Shoulder pain, left 08/11/2020   • Stomach ulcer    • Stroke (HCC) 2019       Allergies:  No Known Allergies    Objective    Objective       Vitals:   Heart Rate:  [64] 64  BP: (146)/(84) 146/84  Body mass index is 27.89 kg/m².     PHYSICAL EXAM:    General Appearance:   · well developed  · well nourished  HENT:   · oropharynx moist  · lips not cyanotic  Neck:  · thyroid not enlarged  · supple  Respiratory:  · no respiratory distress  · normal breath sounds  · no rales  Cardiovascular:  · no jugular venous distention  · regular rhythm  · apical impulse normal  · S1 normal, S2 normal  · no S3, no S4   · no murmur  · no rub, no thrill  · carotid pulses normal; no bruit  · pedal pulses normal  · lower extremity edema: none    Skin:   · warm, dry  Psychiatric:  · judgement and insight appropriate  · normal mood and affect        Result Review:  I have personally reviewed the available results from  [x]  Laboratory  [x]  EKG  [x]  Cardiology  [x]  Medications  [x]  Old records  []  Other:     Procedures  Lab Results   Component Value Date    CHOL 122 12/13/2021     Lab Results   Component Value Date    TRIG 84 12/13/2021    TRIG 92 11/03/2020     Lab Results   Component Value Date    HDL 53 12/13/2021    HDL 41 11/03/2020     Lab Results   Component Value Date    LDL 53 12/13/2021    LDL 55 (L) 11/03/2020     Lab Results   Component Value Date    VLDL 16 12/13/2021    VLDL 18 11/03/2020        Impression/Plan:  1.  Essential hypertension controlled: Continue Lotensin/hydrochlorothiazide 20/12.5 mg a day.  Blood pressure controlled at home.  2.  Paroxysmal atrial fibrillation: Continue Eliquis 5 mg twice daily for stroke prevention.  3.  Sick sinus syndrome s/p permanent pacemaker: Pacemaker check with the  shows  pacemaker is functioning normally.  4.  Hyperlipidemia: Continue Crestor 20 mg a day.  Lipid profile reviewed shows LDL of 53           Kosta Dahl MD   08/16/22   15:02 EDT

## 2022-08-16 ENCOUNTER — OFFICE VISIT (OUTPATIENT)
Dept: CARDIOLOGY | Facility: CLINIC | Age: 72
End: 2022-08-16

## 2022-08-16 VITALS
BODY MASS INDEX: 28 KG/M2 | DIASTOLIC BLOOD PRESSURE: 84 MMHG | SYSTOLIC BLOOD PRESSURE: 146 MMHG | WEIGHT: 200 LBS | HEIGHT: 71 IN | HEART RATE: 64 BPM

## 2022-08-16 DIAGNOSIS — I48.0 PAROXYSMAL ATRIAL FIBRILLATION: Primary | ICD-10-CM

## 2022-08-16 DIAGNOSIS — E78.2 HYPERLIPEMIA, MIXED: ICD-10-CM

## 2022-08-16 DIAGNOSIS — Z95.0 PRESENCE OF PERMANENT CARDIAC PACEMAKER: ICD-10-CM

## 2022-08-16 DIAGNOSIS — I10 HYPERTENSION, ESSENTIAL: ICD-10-CM

## 2022-08-16 PROCEDURE — 99214 OFFICE O/P EST MOD 30 MIN: CPT | Performed by: SPECIALIST

## 2022-08-17 ENCOUNTER — OFFICE VISIT (OUTPATIENT)
Dept: FAMILY MEDICINE CLINIC | Facility: CLINIC | Age: 72
End: 2022-08-17

## 2022-08-17 VITALS
HEIGHT: 71 IN | OXYGEN SATURATION: 96 % | HEART RATE: 81 BPM | SYSTOLIC BLOOD PRESSURE: 148 MMHG | TEMPERATURE: 97.7 F | BODY MASS INDEX: 27.94 KG/M2 | DIASTOLIC BLOOD PRESSURE: 77 MMHG | WEIGHT: 199.6 LBS

## 2022-08-17 DIAGNOSIS — L29.9 PRURITIC DISORDER: ICD-10-CM

## 2022-08-17 DIAGNOSIS — M25.562 ACUTE BILATERAL KNEE PAIN: Primary | ICD-10-CM

## 2022-08-17 DIAGNOSIS — M25.561 ACUTE BILATERAL KNEE PAIN: Primary | ICD-10-CM

## 2022-08-17 PROCEDURE — 99213 OFFICE O/P EST LOW 20 MIN: CPT | Performed by: NURSE PRACTITIONER

## 2022-08-17 RX ORDER — FLUOCINOLONE ACETONIDE 0.11 MG/ML
OIL AURICULAR (OTIC) 2 TIMES DAILY
Qty: 20 ML | Refills: 0 | Status: SHIPPED | OUTPATIENT
Start: 2022-08-17 | End: 2023-01-05

## 2022-08-17 RX ORDER — METHYLPREDNISOLONE 4 MG/1
TABLET ORAL
Qty: 1 EACH | Refills: 0 | Status: SHIPPED | OUTPATIENT
Start: 2022-08-17 | End: 2023-01-05

## 2022-08-17 NOTE — PROGRESS NOTES
Chief Complaint  right knee pain, left knee pain, and right ear itching    Subjective          Alexandre Alvarado is a 72 y.o. male who presents to [facility]    History of Present Illness    Michael knee pain after playing pickel ball. Right knee pain onset 2 months ago. Pt had left twist injury.     Right ear itching - denies taking any allergy meds.          Review of Systems   HENT: Positive for ear pain. Negative for ear discharge, hearing loss, rhinorrhea and sore throat.    Respiratory: Negative for cough.    Gastrointestinal: Negative for abdominal pain, diarrhea and vomiting.   Musculoskeletal: Positive for arthralgias (michael knee pain). Negative for neck pain.   Skin: Positive for rash.   Neurological: Negative for headaches.          Medical History: has a past medical history of Abdominal aortic aneurysm (AAA) (HCC), Advance directive discussed with patient (08/11/2020), Arthritis, Back pain, Cardiac dysrhythmia, Cerumen impaction (08/11/2020), Chronic back pain (08/11/2020), Depression, Diverticulosis, Great toe pain, left (08/11/2020), Heart attack ([facility]) (2011), Heart disease, High cholesterol (08/11/2020), Hip pain, History of colonoscopy (08/11/2020), Hyperlipidemia, Hypertension, essential, Lumbago (10/17/2012), Medication management (08/11/2020), Medication refill (08/11/2020), Myocardial infarction ([facility]) (2011), Pacemaker (08/11/2020), Prostatic hypertrophy, Rectal bleeding, Screening for cancer (09/10/2014), Seasonal allergies, Shoulder pain, left (08/11/2020), Stomach ulcer, and Stroke ([facility]) (2019).     Surgical History: has a past surgical history that includes Colonoscopy (2016); Insert / replace / remove pacemaker; Back surgery; Cardiac surgery; Esophagogastroduodenoscopy; Esophagogastroduodenoscopy (N/A, 8/10/2021); Cardiac catheterization (Nov2011); and Spine surgery (1992 1998 2000).     Family History: family history includes Arthritis in his mother; COPD in his  "father; Cancer in his father and mother; Diabetes in an other family member; Heart disease in his father; Heart failure in an other family member; Osteoporosis in his mother; Other in an other family member; Stroke in his father.     Social History: reports that he has never smoked. He has never used smokeless tobacco. He reports previous alcohol use of about 1.0 standard drink of alcohol per week. He reports that he does not use drugs.    Allergies: Patient has no known allergies.      Health Maintenance Due   Topic Date Due   • TDAP/TD VACCINES (1 - Tdap) Never done   • ZOSTER VACCINE (2 of 3) 12/30/2009            Current Outpatient Medications:   •  apixaban (Eliquis) 5 MG tablet tablet, Take 1 tablet by mouth 2 (Two) Times a Day., Disp: 180 tablet, Rfl: 3  •  benazepril-hydrochlorthiazide (LOTENSIN HCT) 20-12.5 MG per tablet, Take 1 tablet by mouth Daily., Disp: 90 tablet, Rfl: 3  •  rosuvastatin (CRESTOR) 20 MG tablet, Take 1 tablet by mouth Daily., Disp: 90 tablet, Rfl: 3  •  sildenafil (VIAGRA) 100 MG tablet, Take 1 tablet by mouth As Needed for Erectile Dysfunction., Disp: 30 tablet, Rfl: 5  •  fluocinolone acetonide (DERMOTIC) 0.01 % oil otic oil, Administer  to the right ear 2 (Two) Times a Day., Disp: 20 mL, Rfl: 0  •  methylPREDNISolone (MEDROL) 4 MG dose pack, Take as directed on package instructions., Disp: 1 each, Rfl: 0      Immunization History   Administered Date(s) Administered   • COVID-19 (MODERNA) 1st, 2nd, 3rd Dose Only 01/19/2021, 02/12/2021, 10/25/2021   • Flu Vaccine Split Quad 10/01/2015   • Influenza, Unspecified 09/15/2020   • Pneumococcal Conjugate 13-Valent (PCV13) 11/14/2016   • Pneumococcal Polysaccharide (PPSV23) 10/22/2014   • Zostavax 11/04/2009         Objective       Vitals:    08/17/22 1511   BP: 148/77   Pulse: 81   Temp: 97.7 °F (36.5 °C)   TempSrc: Temporal   SpO2: 96%   Weight: 90.5 kg (199 lb 9.6 oz)   Height: 180.3 cm (71\")      Body mass index is 27.84 kg/m².   Wt " Readings from Last 3 Encounters:   08/17/22 90.5 kg (199 lb 9.6 oz)   08/16/22 90.7 kg (200 lb)   12/14/21 91.2 kg (201 lb)      BP Readings from Last 3 Encounters:   08/17/22 148/77   08/16/22 146/84   12/14/21 138/78        Physical Exam  Vitals reviewed.   Constitutional:       Appearance: Normal appearance. He is well-developed.   HENT:      Head: Normocephalic and atraumatic.   Eyes:      Conjunctiva/sclera: Conjunctivae normal.      Pupils: Pupils are equal, round, and reactive to light.   Cardiovascular:      Rate and Rhythm: Normal rate and regular rhythm.      Heart sounds: Normal heart sounds. No murmur heard.  Pulmonary:      Effort: Pulmonary effort is normal.      Breath sounds: Normal breath sounds. No wheezing or rhonchi.   Abdominal:      General: Bowel sounds are normal. There is no distension.      Palpations: Abdomen is soft.      Tenderness: There is no abdominal tenderness.   Musculoskeletal:      Right knee: Tenderness present over the MCL and LCL. No LCL laxity or MCL laxity.      Left knee: Tenderness present over the MCL and LCL. No LCL laxity or MCL laxity.  Skin:     General: Skin is warm and dry.   Neurological:      Mental Status: He is alert and oriented to person, place, and time.   Psychiatric:         Mood and Affect: Mood and affect normal.         Behavior: Behavior normal.         Thought Content: Thought content normal.         Judgment: Judgment normal.             Result Review :       Common labs    Common Labsle 9/9/21 12/13/21 12/13/21 12/13/21 12/13/21     1615 1615 1615 1615   Glucose    105 (A)    BUN    15    Creatinine    0.84    eGFR Non African Am    90    Sodium    138    Potassium    4.0    Chloride    101    Calcium    9.6    Albumin    5.00    Total Bilirubin    0.4    Alkaline Phosphatase    66    AST (SGOT)    20    ALT (SGPT)    16    WBC  10.87 (A)      Hemoglobin  15.1      Hematocrit  46.1      Platelets  335      Total Cholesterol   122     Triglycerides    84     HDL Cholesterol   53     LDL Cholesterol    53     PSA     2.230   Uric Acid 6.5       (A) Abnormal value                             Assessment and Plan        Diagnoses and all orders for this visit:    1. Acute bilateral knee pain (Primary)  -     XR Knee 3 View Right; Future  -     XR Knee 3 View Left; Future  -     methylPREDNISolone (MEDROL) 4 MG dose pack; Take as directed on package instructions.  Dispense: 1 each; Refill: 0  -     Cancel: Ambulatory Referral to Physical Therapy Evaluate and treat  -     Ambulatory Referral to Physical Therapy Evaluate and treat    2. Pruritic disorder  -     fluocinolone acetonide (DERMOTIC) 0.01 % oil otic oil; Administer  to the right ear 2 (Two) Times a Day.  Dispense: 20 mL; Refill: 0          Follow Up     Return if symptoms worsen or fail to improve.    Patient was given instructions and counseling regarding his condition or for health maintenance advice. Please see specific information pulled into the AVS if appropriate.     TA Blas      Answers for HPI/ROS submitted by the patient on 8/14/2022  What is the primary reason for your visit?: Ear Pain

## 2022-08-18 ENCOUNTER — HOSPITAL ENCOUNTER (OUTPATIENT)
Dept: GENERAL RADIOLOGY | Facility: HOSPITAL | Age: 72
Discharge: HOME | End: 2022-08-18

## 2022-08-18 DIAGNOSIS — M25.562 ACUTE BILATERAL KNEE PAIN: ICD-10-CM

## 2022-08-18 DIAGNOSIS — M25.561 ACUTE BILATERAL KNEE PAIN: ICD-10-CM

## 2022-08-18 PROCEDURE — 73562 X-RAY EXAM OF KNEE 3: CPT

## 2022-08-22 ENCOUNTER — TELEPHONE (OUTPATIENT)
Dept: FAMILY MEDICINE CLINIC | Facility: CLINIC | Age: 72
End: 2022-08-22

## 2022-08-22 NOTE — TELEPHONE ENCOUNTER
Caller: Alexandre Alvarado    Relationship: Self    Best call back number: [phone]    Who are you requesting to speak with (clinical staff, provider,  specific staff member): MEDICAL STAFF    What was the call regarding: PATIENT HAS TESTED POSITIVE FOR COVID. HE HAS CONGESTION AND A HEADACHE. HE TOOK A RAPID TEST. HE WANTED TO UPDATE HIS PROVIDER.

## 2022-11-28 ENCOUNTER — OFFICE VISIT (OUTPATIENT)
Dept: ORTHOPEDIC SURGERY | Facility: CLINIC | Age: 72
End: 2022-11-28

## 2022-11-28 VITALS — WEIGHT: 197.1 LBS | HEART RATE: 84 BPM | HEIGHT: 71 IN | OXYGEN SATURATION: 96 % | BODY MASS INDEX: 27.59 KG/M2

## 2022-11-28 DIAGNOSIS — M17.0 BILATERAL PRIMARY OSTEOARTHRITIS OF KNEE: Primary | ICD-10-CM

## 2022-11-28 DIAGNOSIS — M79.661 RIGHT CALF PAIN: ICD-10-CM

## 2022-11-28 PROCEDURE — 99213 OFFICE O/P EST LOW 20 MIN: CPT | Performed by: STUDENT IN AN ORGANIZED HEALTH CARE EDUCATION/TRAINING PROGRAM

## 2022-11-28 PROCEDURE — 20610 DRAIN/INJ JOINT/BURSA W/O US: CPT | Performed by: STUDENT IN AN ORGANIZED HEALTH CARE EDUCATION/TRAINING PROGRAM

## 2022-11-28 RX ADMIN — TRIAMCINOLONE ACETONIDE 40 MG: 40 INJECTION, SUSPENSION INTRA-ARTICULAR; INTRAMUSCULAR at 15:46

## 2022-11-28 RX ADMIN — LIDOCAINE HYDROCHLORIDE 5 ML: 10 INJECTION, SOLUTION INFILTRATION; PERINEURAL at 15:46

## 2022-11-28 NOTE — PROGRESS NOTES
"Chief Complaint  Initial Evaluation and Pain of the Left Knee and Follow-up and Pain of the Right Knee    Subjective          Alexandre Alvarado presents to [facility] for   History of Present Illness    The patient presents here today for re-evaluation of the bilateral knees. He reports the right is worse than the left. He reports he twisted him knee playing pickleball. His right was in July and left was in May. He reports after about 4 hours of sleep his right calf pain wakes him up. He denies previous surgeries or injections to either knee. He admits to popping but the popping is not painful.   No Known Allergies     Social History     Socioeconomic History   • Marital status:    Tobacco Use   • Smoking status: Never   • Smokeless tobacco: Never   Vaping Use   • Vaping Use: Never used   Substance and Sexual Activity   • Alcohol use: Not Currently     Alcohol/week: 1.0 standard drink     Types: 1 Glasses of wine per week     Comment: Current some day   • Drug use: Never   • Sexual activity: Yes     Partners: Female     Birth control/protection: Surgical        I reviewed the patient's chief complaint, history of present illness, review of systems, past medical history, surgical history, family history, social history, medications, and allergy list.     REVIEW OF SYSTEMS    Constitutional: Denies fevers, chills, weight loss  Cardiovascular: Denies chest pain, shortness of breath  Skin: Denies rashes, acute skin changes  Neurologic: Denies headache, loss of consciousness  MSK: Bilateral knee pain      Objective   Vital Signs:   Pulse 84   Ht 180.3 cm (71\")   Wt 89.4 kg (197 lb 1.6 oz)   SpO2 96%   BMI 27.49 kg/m²     Body mass index is 27.49 kg/m².    Physical Exam    General: Alert. No acute distress.   Right knee- full extension. Slight varus alignment. Flexion 120. No joint line pain. Negative Laurie's. No pain with hip motion. Stable to varus/valgus stress. Stable to " anterior/posterior drawer. Negative Lachman's. Medial tenderness to gastrocnemius. Calf is soft. Positive EHL, FHL, GS and TA. Sensation intact to all 5 nerves of the foot. Positive pulses.     Left knee- ROM -5 to 120 degrees. No effusion or joint line pain. Mild crepitus. Stable to varus/valgus stress. Stable to anterior/posterior drawer. Calf soft non-tender. Negative Laurie's. Negative Lachman's.. Positive EHL, FHL, GS and TA. Sensation intact to all 5 nerves of the foot. Positive pulses.     Large Joint Arthrocentesis: R knee  Date/Time: 11/28/2022 3:46 PM  Consent given by: patient  Site marked: site marked  Timeout: Immediately prior to procedure a time out was called to verify the correct patient, procedure, equipment, support staff and site/side marked as required   Supporting Documentation  Indications: pain   Procedure Details  Location: knee - R knee  Needle gauge: 21g.  Medications administered: 5 mL lidocaine 1 %; 40 mg triamcinolone acetonide 40 MG/ML  Patient tolerance: patient tolerated the procedure well with no immediate complications          Imaging Results (Most Recent)     None                   Assessment and Plan        No results found.     Diagnoses and all orders for this visit:    1. Bilateral primary osteoarthritis of knee (Primary)    2. Right calf pain        Discussed the treatment plan with the patient.  I reviewed the x-rays that were obtained previously with the patient. Plan for ultrasound to evaluate for DVT. Discussed the risks and benefits of a right knee steroid injection. The patient expressed understanding and wished to proceed. He tolerated the injection well. Normal knee brace given today.       Call or return if worsening symptoms.    Scribed for Wolf Ellington MD by Sierra Camacho  11/28/2022   15:24 EST         Follow Up       6 weeks    Patient was given instructions and counseling regarding his condition or for health maintenance advice. Please see specific  information pulled into the AVS if appropriate.       I have personally performed the services described in this document as scribed by the above individual and it is both accurate and complete.     Wolf Ellington MD  11/28/22  15:31 EST

## 2022-11-29 RX ORDER — LIDOCAINE HYDROCHLORIDE 10 MG/ML
5 INJECTION, SOLUTION INFILTRATION; PERINEURAL
Status: COMPLETED | OUTPATIENT
Start: 2022-11-28 | End: 2022-11-28

## 2022-11-29 RX ORDER — TRIAMCINOLONE ACETONIDE 40 MG/ML
40 INJECTION, SUSPENSION INTRA-ARTICULAR; INTRAMUSCULAR
Status: COMPLETED | OUTPATIENT
Start: 2022-11-28 | End: 2022-11-28

## 2022-12-19 RX ORDER — APIXABAN 5 MG/1
TABLET, FILM COATED ORAL
Qty: 180 TABLET | Refills: 3 | Status: SHIPPED | OUTPATIENT
Start: 2022-12-19

## 2022-12-20 ENCOUNTER — TELEPHONE (OUTPATIENT)
Dept: ORTHOPEDIC SURGERY | Facility: CLINIC | Age: 72
End: 2022-12-20

## 2022-12-20 ENCOUNTER — HOSPITAL ENCOUNTER (OUTPATIENT)
Dept: CARDIOLOGY | Facility: HOSPITAL | Age: 72
Discharge: HOME | End: 2022-12-20
Admitting: STUDENT IN AN ORGANIZED HEALTH CARE EDUCATION/TRAINING PROGRAM

## 2022-12-20 DIAGNOSIS — M79.661 RIGHT CALF PAIN: ICD-10-CM

## 2022-12-20 DIAGNOSIS — M17.0 BILATERAL PRIMARY OSTEOARTHRITIS OF KNEE: ICD-10-CM

## 2022-12-20 LAB
BH CV LOWER VASCULAR LEFT COMMON FEMORAL AUGMENT: NORMAL
BH CV LOWER VASCULAR LEFT COMMON FEMORAL COMPETENT: NORMAL
BH CV LOWER VASCULAR LEFT COMMON FEMORAL COMPRESS: NORMAL
BH CV LOWER VASCULAR LEFT COMMON FEMORAL PHASIC: NORMAL
BH CV LOWER VASCULAR LEFT COMMON FEMORAL SPONT: NORMAL
BH CV LOWER VASCULAR RIGHT COMMON FEMORAL AUGMENT: NORMAL
BH CV LOWER VASCULAR RIGHT COMMON FEMORAL COMPETENT: NORMAL
BH CV LOWER VASCULAR RIGHT COMMON FEMORAL COMPRESS: NORMAL
BH CV LOWER VASCULAR RIGHT COMMON FEMORAL PHASIC: NORMAL
BH CV LOWER VASCULAR RIGHT COMMON FEMORAL SPONT: NORMAL
BH CV LOWER VASCULAR RIGHT DISTAL FEMORAL COMPRESS: NORMAL
BH CV LOWER VASCULAR RIGHT GASTRONEMIUS COMPRESS: NORMAL
BH CV LOWER VASCULAR RIGHT GREATER SAPH AK COMPRESS: NORMAL
BH CV LOWER VASCULAR RIGHT GREATER SAPH BK COMPRESS: NORMAL
BH CV LOWER VASCULAR RIGHT LESSER SAPH COMPRESS: NORMAL
BH CV LOWER VASCULAR RIGHT MID FEMORAL AUGMENT: NORMAL
BH CV LOWER VASCULAR RIGHT MID FEMORAL COMPETENT: NORMAL
BH CV LOWER VASCULAR RIGHT MID FEMORAL COMPRESS: NORMAL
BH CV LOWER VASCULAR RIGHT MID FEMORAL PHASIC: NORMAL
BH CV LOWER VASCULAR RIGHT MID FEMORAL SPONT: NORMAL
BH CV LOWER VASCULAR RIGHT PERONEAL COMPRESS: NORMAL
BH CV LOWER VASCULAR RIGHT POPLITEAL AUGMENT: NORMAL
BH CV LOWER VASCULAR RIGHT POPLITEAL COMPETENT: NORMAL
BH CV LOWER VASCULAR RIGHT POPLITEAL COMPRESS: NORMAL
BH CV LOWER VASCULAR RIGHT POPLITEAL PHASIC: NORMAL
BH CV LOWER VASCULAR RIGHT POPLITEAL SPONT: NORMAL
BH CV LOWER VASCULAR RIGHT PROXIMAL FEMORAL COMPRESS: NORMAL
MAXIMAL PREDICTED HEART RATE: 146 BPM
STRESS TARGET HR: 124 BPM

## 2022-12-20 PROCEDURE — 93971 EXTREMITY STUDY: CPT

## 2022-12-20 PROCEDURE — 93971 EXTREMITY STUDY: CPT | Performed by: SURGERY

## 2022-12-20 NOTE — TELEPHONE ENCOUNTER
Caller: JENNIFER NECESSARY    Relationship to patient: SELF    Best call back number: [phone]    Patient is needing: PATIENT WAS CALLING TO DISCUSS RESULTS FOR KIRT.

## 2022-12-20 NOTE — TELEPHONE ENCOUNTER
----- Message from Wolf Ellington MD sent at 12/20/2022 11:52 AM EST -----  Please contact patient with results from DVT study      Electronically signed by Wolf Ellington MD, 12/20/22, 11:52 AM EST.    ----- Message -----  From: Randal Dillard MD  Sent: 12/20/2022  11:46 AM EST  To: Wolf Ellington MD

## 2023-01-05 ENCOUNTER — OFFICE VISIT (OUTPATIENT)
Dept: INTERNAL MEDICINE | Facility: CLINIC | Age: 73
End: 2023-01-05
Payer: MEDICARE

## 2023-01-05 VITALS
BODY MASS INDEX: 28.11 KG/M2 | WEIGHT: 200.8 LBS | OXYGEN SATURATION: 95 % | HEIGHT: 71 IN | TEMPERATURE: 97.5 F | SYSTOLIC BLOOD PRESSURE: 142 MMHG | DIASTOLIC BLOOD PRESSURE: 86 MMHG | HEART RATE: 67 BPM

## 2023-01-05 DIAGNOSIS — Z95.0 PRESENCE OF PERMANENT CARDIAC PACEMAKER: ICD-10-CM

## 2023-01-05 DIAGNOSIS — E55.9 VITAMIN D DEFICIENCY: ICD-10-CM

## 2023-01-05 DIAGNOSIS — I10 HYPERTENSION, ESSENTIAL: ICD-10-CM

## 2023-01-05 DIAGNOSIS — E78.2 MIXED HYPERLIPIDEMIA: Primary | ICD-10-CM

## 2023-01-05 DIAGNOSIS — I48.11 LONGSTANDING PERSISTENT ATRIAL FIBRILLATION: ICD-10-CM

## 2023-01-05 DIAGNOSIS — Z95.0 PACEMAKER: ICD-10-CM

## 2023-01-05 DIAGNOSIS — Z12.5 SCREENING PSA (PROSTATE SPECIFIC ANTIGEN): ICD-10-CM

## 2023-01-05 PROCEDURE — 99204 OFFICE O/P NEW MOD 45 MIN: CPT | Performed by: INTERNAL MEDICINE

## 2023-01-05 RX ORDER — CYCLOBENZAPRINE HCL 5 MG
5 TABLET ORAL 2 TIMES DAILY PRN
Qty: 45 TABLET | Refills: 1 | Status: SHIPPED | OUTPATIENT
Start: 2023-01-05

## 2023-01-05 NOTE — PROGRESS NOTES
CHIEF COMPLAINT:  [facility]    stays active and golfs and fishes and plays pickleball    Chronic back issue    Ears full , decreased hearing       Objective   Vital Signs  Vitals:    01/05/23 0937   BP: 142/86   Pulse: 67   Temp: 97.5 °F (36.4 °C)   SpO2: 95%   Weight: 91.1 kg (200 lb 12.8 oz)   Height: 180.3 cm (70.98\")      Body mass index is 28.02 kg/m².  Review of Systems   Constitutional: Negative.    HENT: Negative.    Eyes: Negative.    Respiratory: Negative.    Cardiovascular: Negative.    Gastrointestinal: Negative.    Endocrine: Negative.    Genitourinary: Negative.    Musculoskeletal: Negative.    Allergic/Immunologic: Negative.    Neurological: Negative.    Hematological: Negative.    Psychiatric/Behavioral: Negative.       Physical Exam  Constitutional:       General: He is not in acute distress.     Appearance: Normal appearance.   HENT:      Head: Normocephalic and atraumatic.      Right Ear: Tympanic membrane and ear canal normal.      Left Ear: Tympanic membrane and ear canal normal.      Mouth/Throat:      Mouth: Mucous membranes are moist.      Pharynx: Oropharynx is clear.   Eyes:      General: No scleral icterus.     Extraocular Movements: Extraocular movements intact.      Conjunctiva/sclera: Conjunctivae normal.      Pupils: Pupils are equal, round, and reactive to light.   Neck:      Vascular: No carotid bruit.   Cardiovascular:      Rate and Rhythm: Normal rate and regular rhythm.      Pulses: Normal pulses.      Heart sounds: Normal heart sounds. No murmur heard.    No gallop.   Pulmonary:      Effort: Pulmonary effort is normal. No respiratory distress.      Breath sounds: Normal breath sounds. No wheezing.   Abdominal:      General: Bowel sounds are normal. There is no distension.      Palpations: Abdomen is soft. There is no mass.      Tenderness: There is no abdominal tenderness. There is no guarding.   Musculoskeletal:         General: No swelling or tenderness. Normal range of  motion.      Cervical back: Normal range of motion and neck supple. No tenderness.      Right lower leg: No edema.      Left lower leg: No edema.   Lymphadenopathy:      Cervical: No cervical adenopathy.   Skin:     General: Skin is warm and dry.      Coloration: Skin is not jaundiced.      Findings: No rash.   Neurological:      General: No focal deficit present.      Mental Status: He is alert and oriented to person, place, and time.      Motor: No weakness.      Coordination: Coordination normal.      Gait: Gait normal.   Psychiatric:         Mood and Affect: Mood normal.         Behavior: Behavior normal.         Thought Content: Thought content normal.         Judgment: Judgment normal.     Multiple seborrheic keratosis on the torso neck back and abdominal area,  Result Review :   No results found for: PROBNP, BNP          Lab Results   Component Value Date    TSH 2.720 12/13/2021    TSH 2.410 11/03/2020      No results found for: FREET4                       Visit Diagnoses:    ICD-10-CM ICD-9-CM   1. Mixed hyperlipidemia  E78.2 272.2   2. Longstanding persistent atrial fibrillation (HCC)  I48.11 427.31   3. Pacemaker  Z95.0 V45.01   4. Hypertension, essential  I10 401.9   5. Presence of permanent cardiac pacemaker  Z95.0 V45.01   6. Vitamin D deficiency  E55.9 268.9   7. Screening PSA (prostate specific antigen)  Z12.5 V76.44       Assessment and Plan   Diagnoses and all orders for this visit:    1. Mixed hyperlipidemia (Primary)  -     Comprehensive Metabolic Panel; Future  -     CBC & Differential; Future  -     Lipid Panel; Future  -     PSA Screen; Future  -     Vitamin D,25-Hydroxy; Future  -     Vitamin B12 anemia; Future  -     Folate anemia; Future  -     TSH+Free T4; Future    2. Longstanding persistent atrial fibrillation (HCC)  -     Comprehensive Metabolic Panel; Future  -     CBC & Differential; Future  -     Lipid Panel; Future  -     PSA Screen; Future  -     Vitamin D,25-Hydroxy; Future  -      Vitamin B12 anemia; Future  -     Folate anemia; Future  -     TSH+Free T4; Future    3. Pacemaker  -     Comprehensive Metabolic Panel; Future  -     CBC & Differential; Future  -     Lipid Panel; Future  -     PSA Screen; Future  -     Vitamin D,25-Hydroxy; Future  -     Vitamin B12 anemia; Future  -     Folate anemia; Future  -     TSH+Free T4; Future    4. Hypertension, essential  -     Comprehensive Metabolic Panel; Future  -     CBC & Differential; Future  -     Lipid Panel; Future  -     PSA Screen; Future  -     Vitamin D,25-Hydroxy; Future  -     Vitamin B12 anemia; Future  -     Folate anemia; Future  -     TSH+Free T4; Future    5. Presence of permanent cardiac pacemaker  -     Comprehensive Metabolic Panel; Future  -     CBC & Differential; Future  -     Lipid Panel; Future  -     PSA Screen; Future  -     Vitamin D,25-Hydroxy; Future  -     Vitamin B12 anemia; Future  -     Folate anemia; Future  -     TSH+Free T4; Future    6. Vitamin D deficiency  -     Comprehensive Metabolic Panel; Future  -     CBC & Differential; Future  -     Lipid Panel; Future  -     PSA Screen; Future  -     Vitamin D,25-Hydroxy; Future  -     Vitamin B12 anemia; Future  -     Folate anemia; Future  -     TSH+Free T4; Future    7. Screening PSA (prostate specific antigen)  -     Comprehensive Metabolic Panel; Future  -     CBC & Differential; Future  -     Lipid Panel; Future  -     PSA Screen; Future  -     Vitamin D,25-Hydroxy; Future  -     Vitamin B12 anemia; Future  -     Folate anemia; Future  -     TSH+Free T4; Future    Other orders  -     cyclobenzaprine (FLEXERIL) 5 MG tablet; Take 1 tablet by mouth 2 (Two) Times a Day As Needed for Muscle Spasms.  Dispense: 45 tablet; Refill: 1    Lumbar djd, back pain-, more acute, December 2022, consider muscle relaxers,------prior back surgery- x 3 with fusion, will try Flexeril 5 mg twice daily as needed number 45 tablets no refills sent in January 5, 2023,    Cad, mi -2011,  cath     Ear problems, most likely eustachian tube issues, recommendations are for over-the-counter treatment with Flonase Claritin,    Sss, pacemaker age 36--dr rico reg.     Ed , cont viagra     Stroke --with left arm weakness, 2020, ---started, continues eliquis 5 mg twice a day, by dr sahu     Hypertension continues benazepril HCTZ 20-12.5 mg daily    Hyperlipidemia continues on rosuvastatin 20 mg daily    Knee oa ---dr gallardo, cortisone inject ---, improved, December 2022, venous evaluation right lower leg negative for DVT December 2022    r calf pain, cramps ?     PUD , C SCOPE AND  egd 8/2021, dr earl      Follow Up   Return in about 4 weeks (around 2/2/2023).  Patient was given instructions and counseling regarding his condition or for health maintenance advice. Please see specific information pulled into the AVS if appropriate.

## 2023-01-06 ENCOUNTER — LAB (OUTPATIENT)
Dept: LAB | Facility: HOSPITAL | Age: 73
End: 2023-01-06
Payer: MEDICARE

## 2023-01-06 LAB
25(OH)D3 SERPL-MCNC: 23.6 NG/ML (ref 30–100)
ALBUMIN SERPL-MCNC: 4.6 G/DL (ref 3.5–5.2)
ALBUMIN/GLOB SERPL: 1.5 G/DL
ALP SERPL-CCNC: 60 U/L (ref 39–117)
ALT SERPL W P-5'-P-CCNC: 33 U/L (ref 1–41)
ANION GAP SERPL CALCULATED.3IONS-SCNC: 8.2 MMOL/L (ref 5–15)
AST SERPL-CCNC: 29 U/L (ref 1–40)
BASOPHILS # BLD AUTO: 0.05 10*3/MM3 (ref 0–0.2)
BASOPHILS NFR BLD AUTO: 0.6 % (ref 0–1.5)
BILIRUB SERPL-MCNC: 0.4 MG/DL (ref 0–1.2)
BUN SERPL-MCNC: 22 MG/DL (ref 8–23)
BUN/CREAT SERPL: 22.9 (ref 7–25)
CALCIUM SPEC-SCNC: 9.4 MG/DL (ref 8.6–10.5)
CHLORIDE SERPL-SCNC: 102 MMOL/L (ref 98–107)
CHOLEST SERPL-MCNC: 138 MG/DL (ref 0–200)
CO2 SERPL-SCNC: 29.8 MMOL/L (ref 22–29)
CREAT SERPL-MCNC: 0.96 MG/DL (ref 0.76–1.27)
DEPRECATED RDW RBC AUTO: 40.1 FL (ref 37–54)
EGFRCR SERPLBLD CKD-EPI 2021: 82.9 ML/MIN/1.73
EOSINOPHIL # BLD AUTO: 0.07 10*3/MM3 (ref 0–0.4)
EOSINOPHIL NFR BLD AUTO: 0.9 % (ref 0.3–6.2)
ERYTHROCYTE [DISTWIDTH] IN BLOOD BY AUTOMATED COUNT: 12.2 % (ref 12.3–15.4)
FOLATE SERPL-MCNC: 6.14 NG/ML (ref 4.78–24.2)
GLOBULIN UR ELPH-MCNC: 3 GM/DL
GLUCOSE SERPL-MCNC: 106 MG/DL (ref 65–99)
HCT VFR BLD AUTO: 44.7 % (ref 37.5–51)
HDLC SERPL-MCNC: 60 MG/DL (ref 40–60)
HGB BLD-MCNC: 15.1 G/DL (ref 13–17.7)
IMM GRANULOCYTES # BLD AUTO: 0.04 10*3/MM3 (ref 0–0.05)
IMM GRANULOCYTES NFR BLD AUTO: 0.5 % (ref 0–0.5)
LDLC SERPL CALC-MCNC: 65 MG/DL (ref 0–100)
LDLC/HDLC SERPL: 1.1 {RATIO}
LYMPHOCYTES # BLD AUTO: 3.51 10*3/MM3 (ref 0.7–3.1)
LYMPHOCYTES NFR BLD AUTO: 43.2 % (ref 19.6–45.3)
MCH RBC QN AUTO: 30.2 PG (ref 26.6–33)
MCHC RBC AUTO-ENTMCNC: 33.8 G/DL (ref 31.5–35.7)
MCV RBC AUTO: 89.4 FL (ref 79–97)
MONOCYTES # BLD AUTO: 0.74 10*3/MM3 (ref 0.1–0.9)
MONOCYTES NFR BLD AUTO: 9.1 % (ref 5–12)
NEUTROPHILS NFR BLD AUTO: 3.71 10*3/MM3 (ref 1.7–7)
NEUTROPHILS NFR BLD AUTO: 45.7 % (ref 42.7–76)
NRBC BLD AUTO-RTO: 0 /100 WBC (ref 0–0.2)
PLATELET # BLD AUTO: 278 10*3/MM3 (ref 140–450)
PMV BLD AUTO: 9.6 FL (ref 6–12)
POTASSIUM SERPL-SCNC: 4.3 MMOL/L (ref 3.5–5.2)
PROT SERPL-MCNC: 7.6 G/DL (ref 6–8.5)
PSA SERPL-MCNC: 2.82 NG/ML (ref 0–4)
RBC # BLD AUTO: 5 10*6/MM3 (ref 4.14–5.8)
SODIUM SERPL-SCNC: 140 MMOL/L (ref 136–145)
T4 FREE SERPL-MCNC: 1.22 NG/DL (ref 0.93–1.7)
TRIGL SERPL-MCNC: 60 MG/DL (ref 0–150)
TSH SERPL DL<=0.05 MIU/L-ACNC: 2.11 UIU/ML (ref 0.27–4.2)
VIT B12 BLD-MCNC: 224 PG/ML (ref 211–946)
VLDLC SERPL-MCNC: 13 MG/DL (ref 5–40)
WBC NRBC COR # BLD: 8.12 10*3/MM3 (ref 3.4–10.8)

## 2023-01-06 PROCEDURE — G0103 PSA SCREENING: HCPCS | Performed by: INTERNAL MEDICINE

## 2023-01-06 PROCEDURE — 84443 ASSAY THYROID STIM HORMONE: CPT | Performed by: INTERNAL MEDICINE

## 2023-01-06 PROCEDURE — 36415 COLL VENOUS BLD VENIPUNCTURE: CPT | Performed by: INTERNAL MEDICINE

## 2023-01-06 PROCEDURE — 80061 LIPID PANEL: CPT | Performed by: INTERNAL MEDICINE

## 2023-01-06 PROCEDURE — 84439 ASSAY OF FREE THYROXINE: CPT | Performed by: INTERNAL MEDICINE

## 2023-01-06 PROCEDURE — 80053 COMPREHEN METABOLIC PANEL: CPT | Performed by: INTERNAL MEDICINE

## 2023-01-06 PROCEDURE — 82746 ASSAY OF FOLIC ACID SERUM: CPT | Performed by: INTERNAL MEDICINE

## 2023-01-06 PROCEDURE — 82607 VITAMIN B-12: CPT | Performed by: INTERNAL MEDICINE

## 2023-01-06 PROCEDURE — 85025 COMPLETE CBC W/AUTO DIFF WBC: CPT | Performed by: INTERNAL MEDICINE

## 2023-01-06 PROCEDURE — 82306 VITAMIN D 25 HYDROXY: CPT | Performed by: INTERNAL MEDICINE

## 2023-01-06 RX ORDER — SILDENAFIL 100 MG/1
TABLET, FILM COATED ORAL
Qty: 10 TABLET | Refills: 0 | Status: SHIPPED | OUTPATIENT
Start: 2023-01-06

## 2023-01-09 RX ORDER — SILDENAFIL 100 MG/1
100 TABLET, FILM COATED ORAL AS NEEDED
Qty: 30 TABLET | Refills: 5 | OUTPATIENT
Start: 2023-01-09

## 2023-01-09 RX ORDER — BENAZEPRIL HYDROCHLORIDE AND HYDROCHLOROTHIAZIDE 20; 12.5 MG/1; MG/1
1 TABLET ORAL DAILY
Qty: 90 TABLET | Refills: 3 | OUTPATIENT
Start: 2023-01-09

## 2023-01-11 ENCOUNTER — OFFICE VISIT (OUTPATIENT)
Dept: ORTHOPEDIC SURGERY | Facility: CLINIC | Age: 73
End: 2023-01-11
Payer: MEDICARE

## 2023-01-11 VITALS — OXYGEN SATURATION: 95 % | HEART RATE: 95 BPM | WEIGHT: 200 LBS | BODY MASS INDEX: 28 KG/M2 | HEIGHT: 71 IN

## 2023-01-11 DIAGNOSIS — M17.0 BILATERAL PRIMARY OSTEOARTHRITIS OF KNEE: Primary | ICD-10-CM

## 2023-01-11 PROCEDURE — 99213 OFFICE O/P EST LOW 20 MIN: CPT | Performed by: STUDENT IN AN ORGANIZED HEALTH CARE EDUCATION/TRAINING PROGRAM

## 2023-01-11 NOTE — PROGRESS NOTES
"Chief Complaint  Pain and Follow-up of the Left Knee and Pain and Follow-up of the Right Knee    Subjective          Alexandre Alvarado presents to [facility] for   History of Present Illness    The patient presents here today for follow up evaluation of the bilateral knees. The patient has been treating his bilateral knee osteoarthritis conservatively. He reports he got relief with the previous injections. He had a Zilretta injection in the Right knee on 11/28/22. His DVT study was negative. He reports his pain is improving.     No Known Allergies     Social History     Socioeconomic History   • Marital status:    Tobacco Use   • Smoking status: Never   • Smokeless tobacco: Never   Vaping Use   • Vaping Use: Never used   Substance and Sexual Activity   • Alcohol use: Not Currently     Alcohol/week: 1.0 standard drink     Types: 1 Glasses of wine per week     Comment: Current some day   • Drug use: Never   • Sexual activity: Yes     Partners: Female     Birth control/protection: Surgical        I reviewed the patient's chief complaint, history of present illness, review of systems, past medical history, surgical history, family history, social history, medications, and allergy list.     REVIEW OF SYSTEMS    Constitutional: Denies fevers, chills, weight loss  Cardiovascular: Denies chest pain, shortness of breath  Skin: Denies rashes, acute skin changes  Neurologic: Denies headache, loss of consciousness  MSK: bilateral knee pain      Objective   Vital Signs:   Pulse 95   Ht 180.3 cm (71\")   Wt 90.7 kg (200 lb)   SpO2 95%   BMI 27.89 kg/m²     Body mass index is 27.89 kg/m².    Physical Exam    General: Alert. No acute distress.   Right knee- ROM 0-120 degrees. Mild crepitus. Stable to varus/valgus stress. Stable to anterior/posterior drawer. Positive EHL, FHL, GS and TA. Sensation intact to all 5 nerves of the foot. Positive pulses. No calf tenderness. No joint line pain. " Negative Lachman's. Negative Laurie's.     Left knee- ROM -5 to 120 degrees. No effusion or joint line pain. Mild crepitus. Stable to varus/valgus stress. Stable to anterior/posterior drawer. Calf soft non-tender. Negative Laurie's. Negative Lachman's.. Positive EHL, FHL, GS and TA. Sensation intact to all 5 nerves of the foot. Positive pulses.     Procedures    Imaging Results (Most Recent)     None                   Assessment and Plan        Duplex Venous Lower Extremity - Right CAR    Result Date: 12/20/2022  Narrative: •  Normal right lower extremity venous duplex scan.        Diagnoses and all orders for this visit:    1. Bilateral primary osteoarthritis of knee (Primary)        Discussed the treatment plan with the patient.  Plan to continue with conservative treatment. He can have repeat injections every 3 months. He can resume activity as tolerated. Plan to continue medication as needed.     Call or return if worsening symptoms.    Scribed for Wolf Ellington MD by Sierra Camacho  01/11/2023   14:44 EST         Follow Up       PRN    Patient was given instructions and counseling regarding his condition or for health maintenance advice. Please see specific information pulled into the AVS if appropriate.       I have personally performed the services described in this document as scribed by the above individual and it is both accurate and complete.     Wolf Ellington MD  01/11/23  15:02 EST

## 2023-02-07 ENCOUNTER — OFFICE VISIT (OUTPATIENT)
Dept: INTERNAL MEDICINE | Facility: CLINIC | Age: 73
End: 2023-02-07
Payer: MEDICARE

## 2023-02-07 VITALS
WEIGHT: 203.6 LBS | BODY MASS INDEX: 28.5 KG/M2 | HEIGHT: 71 IN | DIASTOLIC BLOOD PRESSURE: 84 MMHG | TEMPERATURE: 98 F | OXYGEN SATURATION: 94 % | HEART RATE: 76 BPM | SYSTOLIC BLOOD PRESSURE: 153 MMHG

## 2023-02-07 DIAGNOSIS — E55.9 VITAMIN D DEFICIENCY: ICD-10-CM

## 2023-02-07 DIAGNOSIS — Z12.5 SCREENING PSA (PROSTATE SPECIFIC ANTIGEN): ICD-10-CM

## 2023-02-07 DIAGNOSIS — M17.0 BILATERAL PRIMARY OSTEOARTHRITIS OF KNEE: ICD-10-CM

## 2023-02-07 DIAGNOSIS — Z95.0 PACEMAKER: ICD-10-CM

## 2023-02-07 DIAGNOSIS — N40.0 PROSTATIC HYPERTROPHY: ICD-10-CM

## 2023-02-07 DIAGNOSIS — J30.2 SEASONAL ALLERGIES: ICD-10-CM

## 2023-02-07 DIAGNOSIS — I10 HYPERTENSION, ESSENTIAL: ICD-10-CM

## 2023-02-07 DIAGNOSIS — E78.2 MIXED HYPERLIPIDEMIA: Primary | ICD-10-CM

## 2023-02-07 PROCEDURE — 99214 OFFICE O/P EST MOD 30 MIN: CPT | Performed by: INTERNAL MEDICINE

## 2023-02-07 RX ORDER — BENAZEPRIL HYDROCHLORIDE AND HYDROCHLOROTHIAZIDE 20; 12.5 MG/1; MG/1
1 TABLET ORAL DAILY
Qty: 90 TABLET | Refills: 3 | Status: SHIPPED | OUTPATIENT
Start: 2023-02-07 | End: 2023-03-16

## 2023-02-07 RX ORDER — AMLODIPINE BESYLATE 5 MG/1
5 TABLET ORAL DAILY
Qty: 90 TABLET | Refills: 3 | Status: SHIPPED | OUTPATIENT
Start: 2023-02-07 | End: 2023-03-21 | Stop reason: SDUPTHER

## 2023-02-07 RX ORDER — ROSUVASTATIN CALCIUM 20 MG/1
20 TABLET, COATED ORAL DAILY
Qty: 90 TABLET | Refills: 3 | Status: SHIPPED | OUTPATIENT
Start: 2023-02-07

## 2023-02-07 NOTE — PROGRESS NOTES
"Answers for HPI/ROS submitted by the patient on 2/6/2023  What is the primary reason for your visit?: Ear Pain  Affected ear: both  Chronicity: recurrent  Onset: more than 1 month ago  Progression since onset: gradually improving  Frequency: constantly  Fever: no fever  Pain - numeric: 2/10  headaches: No    CHIEF COMPLAINT:  Hyperlipidemia and Follow-up      HPI: Patient is here to follow-up routine labs, still having some ear issues specially on the right side,        Objective   Vital Signs  Vitals:    02/07/23 1402   BP: 153/84   Pulse: 76   Temp: 98 °F (36.7 °C)   SpO2: 94%   Weight: 92.4 kg (203 lb 9.6 oz)   Height: 180.3 cm (70.98\")      Body mass index is 28.41 kg/m².  Review of Systems   Constitutional: Negative.    HENT: Positive for ear pain and hearing loss. Negative for ear discharge, rhinorrhea and sore throat.    Eyes: Negative.    Respiratory: Negative.  Negative for cough.    Cardiovascular: Negative.    Gastrointestinal: Negative.  Negative for abdominal pain, diarrhea and vomiting.   Endocrine: Negative.    Genitourinary: Negative.    Musculoskeletal: Negative.  Negative for neck pain.   Skin: Negative for rash.   Allergic/Immunologic: Negative.    Neurological: Negative.    Hematological: Negative.    Psychiatric/Behavioral: Negative.       Physical Exam  Constitutional:       General: He is not in acute distress.     Appearance: Normal appearance.   HENT:      Head: Normocephalic.      Mouth/Throat:      Mouth: Mucous membranes are moist.   Eyes:      Conjunctiva/sclera: Conjunctivae normal.      Pupils: Pupils are equal, round, and reactive to light.   Cardiovascular:      Rate and Rhythm: Normal rate and regular rhythm.      Pulses: Normal pulses.      Heart sounds: Normal heart sounds.   Pulmonary:      Effort: Pulmonary effort is normal.      Breath sounds: Normal breath sounds.   Abdominal:      General: Abdomen is flat. Bowel sounds are normal.      Palpations: Abdomen is soft. "   Musculoskeletal:         General: No swelling. Normal range of motion.      Cervical back: Neck supple.   Skin:     General: Skin is warm and dry.      Coloration: Skin is not jaundiced.   Neurological:      General: No focal deficit present.      Mental Status: He is alert and oriented to person, place, and time. Mental status is at baseline.   Psychiatric:         Mood and Affect: Mood normal.         Behavior: Behavior normal.         Thought Content: Thought content normal.         Judgment: Judgment normal.      nl rom of hip and knee b/l with flex , int . Ext rotations     Result Review :   No results found for: PROBNP, BNP  CMP    CMP 1/6/23   Glucose 106 (A)   BUN 22   Creatinine 0.96   eGFR 82.9   Sodium 140   Potassium 4.3   Chloride 102   Calcium 9.4   Total Protein 7.6   Albumin 4.6   Globulin 3.0   Total Bilirubin 0.4   Alkaline Phosphatase 60   AST (SGOT) 29   ALT (SGPT) 33   Albumin/Globulin Ratio 1.5   BUN/Creatinine Ratio 22.9   Anion Gap 8.2   (A) Abnormal value       Comments are available for some flowsheets but are not being displayed.           CBC w/diff    CBC w/Diff 1/6/23   WBC 8.12   RBC 5.00   Hemoglobin 15.1   Hematocrit 44.7   MCV 89.4   MCH 30.2   MCHC 33.8   RDW 12.2 (A)   Platelets 278   Neutrophil Rel % 45.7   Immature Granulocyte Rel % 0.5   Lymphocyte Rel % 43.2   Monocyte Rel % 9.1   Eosinophil Rel % 0.9   Basophil Rel % 0.6   (A) Abnormal value             Lipid Panel    Lipid Panel 1/6/23   Total Cholesterol 138   Triglycerides 60   HDL Cholesterol 60   VLDL Cholesterol 13   LDL Cholesterol  65   LDL/HDL Ratio 1.10            Lab Results   Component Value Date    TSH 2.110 01/06/2023    TSH 2.720 12/13/2021    TSH 2.410 11/03/2020      Lab Results   Component Value Date    FREET4 1.22 01/06/2023         PSA    PSA 1/6/23   PSA 2.820                          Visit Diagnoses:    ICD-10-CM ICD-9-CM   1. Mixed hyperlipidemia  E78.2 272.2   2. Vitamin D deficiency  E55.9 268.9    3. Pacemaker  Z95.0 V45.01   4. Hypertension, essential  I10 401.9   5. Screening PSA (prostate specific antigen)  Z12.5 V76.44   6. Seasonal allergies  J30.2 477.9   7. Bilateral primary osteoarthritis of knee  M17.0 715.16   8. Prostatic hypertrophy  N40.0 600.90       Assessment and Plan   Diagnoses and all orders for this visit:    1. Mixed hyperlipidemia (Primary)  -     Cancel: Vitamin D,25-Hydroxy; Future  -     Cancel: Vitamin B12 anemia; Future  -     Cancel: Folate anemia; Future  -     Cancel: Comprehensive Metabolic Panel; Future  -     Folate anemia; Future  -     Vitamin D,25-Hydroxy; Future  -     Vitamin B12 anemia; Future  -     Comprehensive Metabolic Panel; Future    2. Vitamin D deficiency  -     Cancel: Vitamin D,25-Hydroxy; Future  -     Cancel: Vitamin B12 anemia; Future  -     Cancel: Folate anemia; Future  -     Cancel: Comprehensive Metabolic Panel; Future  -     Folate anemia; Future  -     Vitamin D,25-Hydroxy; Future  -     Vitamin B12 anemia; Future  -     Comprehensive Metabolic Panel; Future    3. Pacemaker  -     Cancel: Vitamin D,25-Hydroxy; Future  -     Cancel: Vitamin B12 anemia; Future  -     Cancel: Folate anemia; Future  -     Cancel: Comprehensive Metabolic Panel; Future  -     Folate anemia; Future  -     Vitamin D,25-Hydroxy; Future  -     Vitamin B12 anemia; Future  -     Comprehensive Metabolic Panel; Future    4. Hypertension, essential  -     Cancel: Vitamin D,25-Hydroxy; Future  -     Cancel: Vitamin B12 anemia; Future  -     Cancel: Folate anemia; Future  -     Cancel: Comprehensive Metabolic Panel; Future  -     Folate anemia; Future  -     Vitamin D,25-Hydroxy; Future  -     Vitamin B12 anemia; Future  -     Comprehensive Metabolic Panel; Future    5. Screening PSA (prostate specific antigen)  -     Cancel: Vitamin D,25-Hydroxy; Future  -     Cancel: Vitamin B12 anemia; Future  -     Cancel: Folate anemia; Future  -     Cancel: Comprehensive Metabolic Panel;  Future  -     Folate anemia; Future  -     Vitamin D,25-Hydroxy; Future  -     Vitamin B12 anemia; Future  -     Comprehensive Metabolic Panel; Future    6. Seasonal allergies  -     Cancel: Vitamin D,25-Hydroxy; Future  -     Cancel: Vitamin B12 anemia; Future  -     Cancel: Folate anemia; Future  -     Cancel: Comprehensive Metabolic Panel; Future  -     Folate anemia; Future  -     Vitamin D,25-Hydroxy; Future  -     Vitamin B12 anemia; Future  -     Comprehensive Metabolic Panel; Future    7. Bilateral primary osteoarthritis of knee  -     Cancel: Vitamin D,25-Hydroxy; Future  -     Cancel: Vitamin B12 anemia; Future  -     Cancel: Folate anemia; Future  -     Cancel: Comprehensive Metabolic Panel; Future  -     Folate anemia; Future  -     Vitamin D,25-Hydroxy; Future  -     Vitamin B12 anemia; Future  -     Comprehensive Metabolic Panel; Future    8. Prostatic hypertrophy  -     Cancel: Vitamin D,25-Hydroxy; Future  -     Cancel: Vitamin B12 anemia; Future  -     Cancel: Folate anemia; Future  -     Cancel: Comprehensive Metabolic Panel; Future  -     Folate anemia; Future  -     Vitamin D,25-Hydroxy; Future  -     Vitamin B12 anemia; Future  -     Comprehensive Metabolic Panel; Future    Other orders  -     amLODIPine (NORVASC) 5 MG tablet; Take 1 tablet by mouth Daily.  Dispense: 90 tablet; Refill: 3  -     benazepril-hydrochlorthiazide (LOTENSIN HCT) 20-12.5 MG per tablet; Take 1 tablet by mouth Daily.  Dispense: 90 tablet; Refill: 3  -     rosuvastatin (CRESTOR) 20 MG tablet; Take 1 tablet by mouth Daily.  Dispense: 90 tablet; Refill: 3        Lumbar djd, back pain-, more acute, December 2022, consider muscle relaxers,------prior back surgery- x 3 with fusion, will try Flexeril 5 mg twice daily as needed number 45 tablets no refills sent in January 5, 2023,    Cad, mi -2011, cath     Ear problems, most likely eustachian tube issues, recommendations are for over-the-counter treatment with Flonase  Claritin,    Sss, pacemaker age 36--dr rico reg.     Ed , cont viagra     Stroke --with left arm weakness, 2020, - continues eliquis 5 mg twice a day, by dr sahu     Hypertension---  too high, recently, February 2023, continues benazepril HCTZ 20-12.5 mg daily, mildly elevated, will add Norvasc 5 mg nightly, February 7, 2023    PSA value 2.8, January 6, 2023    Hyperlipidemia continues on rosuvastatin 20 mg daily    Knee oa ---dr gallarod, cortisone inject ---, improved, December 2022, venous evaluation right lower leg negative for DVT December 2022    r calf pain, cramps ?     PUD , C SCOPE AND  egd 8/2021, dr earl      Follow Up   Return in about 6 months (around 8/7/2023).  Patient was given instructions and counseling regarding his condition or for health maintenance advice. Please see specific information pulled into the AVS if appropriate.

## 2023-02-16 RX ORDER — BENAZEPRIL HYDROCHLORIDE AND HYDROCHLOROTHIAZIDE 20; 12.5 MG/1; MG/1
TABLET ORAL
Qty: 90 TABLET | Refills: 3 | OUTPATIENT
Start: 2023-02-16

## 2023-03-13 RX ORDER — ROSUVASTATIN CALCIUM 20 MG/1
TABLET, COATED ORAL
Qty: 90 TABLET | Refills: 3 | OUTPATIENT
Start: 2023-03-13

## 2023-03-16 RX ORDER — BENAZEPRIL HYDROCHLORIDE AND HYDROCHLOROTHIAZIDE 20; 12.5 MG/1; MG/1
TABLET ORAL
Qty: 90 TABLET | Refills: 3 | Status: SHIPPED | OUTPATIENT
Start: 2023-03-16

## 2023-03-18 NOTE — PROGRESS NOTES
[facility]  Cardiology progress Note    Patient Name: Alexandre Alvarado  : 1950    CHIEF COMPLAINT  Atrial fibrillation        Subjective   Subjective     HISTORY OF PRESENT ILLNESS    Alexandre Alvarado is a 73 y.o. male with history of atrial fibrillation and pacemaker implantation.  No palpitations.  He has some shortness of breath on exertion for the last few months.  No chest pain.    REVIEW OF SYSTEMS    Constitutional:    No fever, no weight loss  Skin:     No rash  Otolaryngeal:    No difficulty swallowing  Cardiovascular: See HPI.  Pulmonary:    No cough, no sputum production    Personal History     Social History:    reports that he has never smoked. He has never used smokeless tobacco. He reports that he does not currently use alcohol after a past usage of about 1.0 standard drink per week. He reports that he does not use drugs.    Home Medications:  Current Outpatient Medications on File Prior to Visit   Medication Sig   • amLODIPine (NORVASC) 5 MG tablet Take 1 tablet by mouth Daily.   • benazepril-hydrochlorthiazide (LOTENSIN HCT) 20-12.5 MG per tablet TAKE 1 TABLET BY MOUTH EVERY DAY   • cyclobenzaprine (FLEXERIL) 5 MG tablet Take 1 tablet by mouth 2 (Two) Times a Day As Needed for Muscle Spasms.   • Eliquis 5 MG tablet tablet TAKE 1 TABLET BY MOUTH TWICE A DAY   • rosuvastatin (CRESTOR) 20 MG tablet Take 1 tablet by mouth Daily.   • sildenafil (VIAGRA) 100 MG tablet TAKE ONE TABLET BY MOUTH AS NEEDED FOR ERECTILE DYSFUNCTION     No current facility-administered medications on file prior to visit.       Past Medical History:   Diagnosis Date   • Abdominal aortic aneurysm (AAA)    • Advance directive discussed with patient 2020   • Arthritis    • Arthritis of back    • Back pain    • Bursitis of hip    • Cardiac dysrhythmia    • Cataract     Corrected   • Cerumen impaction 2020   • Chronic back pain 2020   • Depression    • Diverticulosis    • Erectile  dysfunction 2019   • Fracture, finger 1967   • Great toe pain, left 08/11/2020   • Heart attack ([facility]) 2011   • Heart disease    • High cholesterol 08/11/2020   • Hip pain    • History of colonoscopy 08/11/2020   • Hyperlipidemia    • Hypertension, essential    • Knee sprain 2022    Both knees left May. Right July   • Knee swelling 2020   • Low back strain 1968    Factory accident   • Lumbago 10/17/2012    low back pain   • Lumbosacral disc disease 1992    Operations  1992   1998 and 2000 when fused   • Medication management 08/11/2020   • Medication refill 08/11/2020   • Myocardial infarction ([facility]) 2011   • Pacemaker 08/11/2020   • Prostatic hypertrophy     Benign   • Rectal bleeding    • Screening for cancer 09/10/2014    unspecified   • Seasonal allergies    • Shoulder pain, left 08/11/2020   • Stomach ulcer    • Stroke ([facility]) 2019   • Tendinitis of knee 2022   • Tennis elbow 1988    Various times       Allergies:  No Known Allergies    Objective    Objective       Vitals:   Heart Rate:  [80] 80  BP: (171)/(85) 171/85  Body mass index is 28.59 kg/m².     PHYSICAL EXAM:    General Appearance:   · well developed  · well nourished  HENT:   · oropharynx moist  · lips not cyanotic  Neck:  · thyroid not enlarged  · supple  Respiratory:  · no respiratory distress  · normal breath sounds  · no rales  Cardiovascular:  · no jugular venous distention  · regular rhythm  · apical impulse normal  · S1 normal, S2 normal  · no S3, no S4   · no murmur  · no rub, no thrill  · carotid pulses normal; no bruit  · pedal pulses normal  · lower extremity edema: none    Skin:   · warm, dry  Psychiatric:  · judgement and insight appropriate  · normal mood and affect        Result Review:  I have personally reviewed the available results from  [x]  Laboratory  [x]  EKG  [x]  Cardiology  [x]  Medications  [x]  Old records  []  Other:     Procedures  Lab Results   Component Value Date    CHOL 138 01/06/2023    CHOL 122 12/13/2021     Lab  Results   Component Value Date    TRIG 60 01/06/2023    TRIG 84 12/13/2021    TRIG 92 11/03/2020     Lab Results   Component Value Date    HDL 60 01/06/2023    HDL 53 12/13/2021    HDL 41 11/03/2020     Lab Results   Component Value Date    LDL 65 01/06/2023    LDL 53 12/13/2021    LDL 55 (L) 11/03/2020     Lab Results   Component Value Date    VLDL 13 01/06/2023    VLDL 16 12/13/2021    VLDL 18 11/03/2020        Impression/Plan:  1.  Sick sinus syndrome status post permanent pacemaker: Pacemaker check shows pacemaker is functioning normally.  2.  Paroxysmal atrial fibrillation: Continue Eliquis 5 mg twice a day for stroke prevention.  Able to tolerate Eliquis without any side effects.  No atrial fibrillation and pacemaker checks recently.  3.  Hyperlipidemia: Continue Crestor 20 mg once a day.  Monitor lipid and hepatic profile.  4.  Essential hypertension Uncontrolled: Continue Lotensin/hydrochlorothiazide 20/12.5 mg once a day.  Increase amlodipine to 10 mg once a day.  Monitor blood pressure regularly at home.  5.  Shortness of breath: Sestamibi stress test evaluate for ischemia.  Echocardiogram.        Kosta Dahl MD   03/21/23   14:36 EDT

## 2023-03-21 ENCOUNTER — OFFICE VISIT (OUTPATIENT)
Dept: CARDIOLOGY | Facility: CLINIC | Age: 73
End: 2023-03-21
Payer: MEDICARE

## 2023-03-21 VITALS
SYSTOLIC BLOOD PRESSURE: 171 MMHG | DIASTOLIC BLOOD PRESSURE: 85 MMHG | WEIGHT: 205 LBS | HEIGHT: 71 IN | HEART RATE: 80 BPM | BODY MASS INDEX: 28.7 KG/M2

## 2023-03-21 DIAGNOSIS — Z95.0 PRESENCE OF PERMANENT CARDIAC PACEMAKER: ICD-10-CM

## 2023-03-21 DIAGNOSIS — I10 HYPERTENSION, ESSENTIAL: Primary | ICD-10-CM

## 2023-03-21 DIAGNOSIS — R06.02 SHORTNESS OF BREATH: ICD-10-CM

## 2023-03-21 DIAGNOSIS — I48.0 PAROXYSMAL ATRIAL FIBRILLATION: ICD-10-CM

## 2023-03-21 PROCEDURE — 3079F DIAST BP 80-89 MM HG: CPT | Performed by: SPECIALIST

## 2023-03-21 PROCEDURE — 1159F MED LIST DOCD IN RCRD: CPT | Performed by: SPECIALIST

## 2023-03-21 PROCEDURE — 3077F SYST BP >= 140 MM HG: CPT | Performed by: SPECIALIST

## 2023-03-21 PROCEDURE — 1160F RVW MEDS BY RX/DR IN RCRD: CPT | Performed by: SPECIALIST

## 2023-03-21 PROCEDURE — 99214 OFFICE O/P EST MOD 30 MIN: CPT | Performed by: SPECIALIST

## 2023-03-21 RX ORDER — AMLODIPINE BESYLATE 10 MG/1
10 TABLET ORAL DAILY
Qty: 90 TABLET | Refills: 9 | Status: SHIPPED | OUTPATIENT
Start: 2023-03-21

## 2023-04-03 RX ORDER — ROSUVASTATIN CALCIUM 20 MG/1
TABLET, COATED ORAL
Qty: 90 TABLET | Refills: 3 | OUTPATIENT
Start: 2023-04-03

## 2023-05-01 RX ORDER — SILDENAFIL 100 MG/1
100 TABLET, FILM COATED ORAL AS NEEDED
Qty: 10 TABLET | Refills: 0 | Status: SHIPPED | OUTPATIENT
Start: 2023-05-01

## 2023-05-09 ENCOUNTER — HOSPITAL ENCOUNTER (OUTPATIENT)
Dept: NUCLEAR MEDICINE | Facility: HOSPITAL | Age: 73
Discharge: HOME | End: 2023-05-09
Payer: MEDICARE

## 2023-05-09 ENCOUNTER — HOSPITAL ENCOUNTER (OUTPATIENT)
Dept: CARDIOLOGY | Facility: HOSPITAL | Age: 73
Discharge: HOME | End: 2023-05-09
Payer: MEDICARE

## 2023-05-09 DIAGNOSIS — R06.02 SHORTNESS OF BREATH: ICD-10-CM

## 2023-05-09 LAB
BH CV ECHO MEAS - AO ROOT DIAM: 3.2 CM
BH CV ECHO MEAS - EDV(CUBED): 82.9 ML
BH CV ECHO MEAS - EDV(MOD-SP2): 59.8 ML
BH CV ECHO MEAS - EDV(MOD-SP4): 75.9 ML
BH CV ECHO MEAS - EF(MOD-BP): 71.9 %
BH CV ECHO MEAS - EF(MOD-SP2): 62.7 %
BH CV ECHO MEAS - EF(MOD-SP4): 77.7 %
BH CV ECHO MEAS - ESV(CUBED): 34.6 ML
BH CV ECHO MEAS - ESV(MOD-SP2): 22.3 ML
BH CV ECHO MEAS - ESV(MOD-SP4): 16.9 ML
BH CV ECHO MEAS - FS: 25.2 %
BH CV ECHO MEAS - IVS/LVPW: 1.06 CM
BH CV ECHO MEAS - IVSD: 1.24 CM
BH CV ECHO MEAS - LA DIMENSION: 4.4 CM
BH CV ECHO MEAS - LAT PEAK E' VEL: 9.1 CM/SEC
BH CV ECHO MEAS - LV MASS(C)D: 189.8 GRAMS
BH CV ECHO MEAS - LVIDD: 4.4 CM
BH CV ECHO MEAS - LVIDS: 3.3 CM
BH CV ECHO MEAS - LVPWD: 1.17 CM
BH CV ECHO MEAS - MED PEAK E' VEL: 9 CM/SEC
BH CV ECHO MEAS - MV A MAX VEL: 68.6 CM/SEC
BH CV ECHO MEAS - MV DEC SLOPE: 719 CM/SEC2
BH CV ECHO MEAS - MV DEC TIME: 0.1 MSEC
BH CV ECHO MEAS - MV E MAX VEL: 71.6 CM/SEC
BH CV ECHO MEAS - MV E/A: 1.04
BH CV ECHO MEAS - RVDD: 2.9 CM
BH CV ECHO MEAS - SV(MOD-SP2): 37.5 ML
BH CV ECHO MEAS - SV(MOD-SP4): 59 ML
BH CV ECHO MEASUREMENTS AVERAGE E/E' RATIO: 7.91
BH CV IMMEDIATE POST RECOVERY TECH DATA SYMPTOMS: NORMAL
BH CV IMMEDIATE POST TECH DATA BLOOD PRESSURE: NORMAL MMHG
BH CV IMMEDIATE POST TECH DATA HEART RATE: 122 BPM
BH CV IMMEDIATE POST TECH DATA OXYGEN SATS: 98 %
BH CV REST NUCLEAR ISOTOPE DOSE: 10.2 MCI
BH CV SIX MINUTE RECOVERY TECH DATA BLOOD PRESSURE: NORMAL
BH CV SIX MINUTE RECOVERY TECH DATA HEART RATE: 77 BPM
BH CV SIX MINUTE RECOVERY TECH DATA OXYGEN SATURATION: 98 %
BH CV SIX MINUTE RECOVERY TECH DATA SYMPTOMS: NORMAL
BH CV STRESS BP STAGE 1: NORMAL
BH CV STRESS BP STAGE 2: NORMAL
BH CV STRESS BP STAGE 3: NORMAL
BH CV STRESS DURATION MIN STAGE 1: 3
BH CV STRESS DURATION MIN STAGE 2: 3
BH CV STRESS DURATION MIN STAGE 3: 3
BH CV STRESS DURATION SEC STAGE 1: 0
BH CV STRESS DURATION SEC STAGE 2: 0
BH CV STRESS DURATION SEC STAGE 3: 0
BH CV STRESS GRADE STAGE 1: 10
BH CV STRESS GRADE STAGE 2: 12
BH CV STRESS GRADE STAGE 3: 14
BH CV STRESS HR STAGE 1: 98
BH CV STRESS HR STAGE 2: 117
BH CV STRESS HR STAGE 3: 126
BH CV STRESS METS STAGE 1: 5
BH CV STRESS METS STAGE 2: 7.5
BH CV STRESS METS STAGE 3: 10
BH CV STRESS NUCLEAR ISOTOPE DOSE: 37 MCI
BH CV STRESS O2 STAGE 1: 96
BH CV STRESS O2 STAGE 2: 96
BH CV STRESS O2 STAGE 3: 97
BH CV STRESS PROTOCOL 1: NORMAL
BH CV STRESS RECOVERY BP: NORMAL MMHG
BH CV STRESS RECOVERY HR: 77 BPM
BH CV STRESS RECOVERY O2: 98 %
BH CV STRESS SPEED STAGE 1: 1.7
BH CV STRESS SPEED STAGE 2: 2.5
BH CV STRESS SPEED STAGE 3: 3.4
BH CV STRESS STAGE 1: 1
BH CV STRESS STAGE 2: 2
BH CV STRESS STAGE 3: 3
BH CV THREE MINUTE POST TECH DATA BLOOD PRESSURE: NORMAL MMHG
BH CV THREE MINUTE POST TECH DATA HEART RATE: 82 BPM
BH CV THREE MINUTE POST TECH DATA OXYGEN SATURATION: 98 %
IVRT: 66 MSEC
LEFT ATRIUM VOLUME INDEX: 23.3 ML/M2
LV EF NUC BP: 64 %
MAXIMAL PREDICTED HEART RATE: 145 BPM
MAXIMAL PREDICTED HEART RATE: 145 BPM
PERCENT MAX PREDICTED HR: 86.9 %
STRESS BASELINE BP: NORMAL MMHG
STRESS BASELINE HR: 65 BPM
STRESS O2 SAT REST: 96 %
STRESS PERCENT HR: 102 %
STRESS POST ESTIMATED WORKLOAD: 8.6 METS
STRESS POST EXERCISE DUR MIN: 7 MIN
STRESS POST EXERCISE DUR SEC: 30 SEC
STRESS POST O2 SAT PEAK: 96 %
STRESS POST PEAK BP: NORMAL MMHG
STRESS POST PEAK HR: 126 BPM
STRESS TARGET HR: 123 BPM
STRESS TARGET HR: 123 BPM

## 2023-05-09 PROCEDURE — 0 TECHNETIUM TETROFOSMIN KIT: Performed by: SPECIALIST

## 2023-05-09 PROCEDURE — 78452 HT MUSCLE IMAGE SPECT MULT: CPT

## 2023-05-09 PROCEDURE — 93306 TTE W/DOPPLER COMPLETE: CPT

## 2023-05-09 PROCEDURE — 93017 CV STRESS TEST TRACING ONLY: CPT

## 2023-05-09 PROCEDURE — A9502 TC99M TETROFOSMIN: HCPCS | Performed by: SPECIALIST

## 2023-05-09 PROCEDURE — 93306 TTE W/DOPPLER COMPLETE: CPT | Performed by: SPECIALIST

## 2023-05-09 RX ADMIN — TETROFOSMIN 1 DOSE: 1.38 INJECTION, POWDER, LYOPHILIZED, FOR SOLUTION INTRAVENOUS at 09:22

## 2023-05-09 RX ADMIN — TETROFOSMIN 1 DOSE: 1.38 INJECTION, POWDER, LYOPHILIZED, FOR SOLUTION INTRAVENOUS at 07:55

## 2023-05-15 RX ORDER — SILDENAFIL 100 MG/1
100 TABLET, FILM COATED ORAL AS NEEDED
Qty: 30 TABLET | Refills: 1 | Status: SHIPPED | OUTPATIENT
Start: 2023-05-15

## 2023-05-15 RX ORDER — BENAZEPRIL HYDROCHLORIDE AND HYDROCHLOROTHIAZIDE 20; 12.5 MG/1; MG/1
1 TABLET ORAL DAILY
Qty: 90 TABLET | Refills: 1 | Status: SHIPPED | OUTPATIENT
Start: 2023-05-15

## 2023-06-19 ENCOUNTER — OFFICE VISIT (OUTPATIENT)
Dept: ORTHOPEDIC SURGERY | Facility: CLINIC | Age: 73
End: 2023-06-19
Payer: MEDICARE

## 2023-06-19 VITALS
HEIGHT: 71 IN | HEART RATE: 88 BPM | WEIGHT: 205 LBS | BODY MASS INDEX: 28.7 KG/M2 | SYSTOLIC BLOOD PRESSURE: 134 MMHG | DIASTOLIC BLOOD PRESSURE: 76 MMHG | OXYGEN SATURATION: 95 %

## 2023-06-19 DIAGNOSIS — M17.0 BILATERAL PRIMARY OSTEOARTHRITIS OF KNEE: Primary | ICD-10-CM

## 2023-06-19 RX ORDER — METHYLPREDNISOLONE 4 MG/1
1 TABLET ORAL DAILY
Qty: 21 TABLET | Refills: 0 | Status: SHIPPED | OUTPATIENT
Start: 2023-06-19

## 2023-06-19 RX ORDER — GABAPENTIN 100 MG/1
100 CAPSULE ORAL NIGHTLY
Qty: 30 CAPSULE | Refills: 0 | Status: SHIPPED | OUTPATIENT
Start: 2023-06-19

## 2023-06-19 RX ORDER — AMLODIPINE BESYLATE 5 MG/1
TABLET ORAL
COMMUNITY
Start: 2023-05-05

## 2023-06-19 NOTE — PROGRESS NOTES
"Chief Complaint  Pain and Follow-up of the Left Knee and Pain and Follow-up of the Right Knee    Subjective          Alexandre Alvarado presents to [facility] for   History of Present Illness    The patient presents here today for follow up evaluation of the bilateral knees. The patient has been treating his bilateral knee osteoarthritis conservatively. He has had injections in the past. He reports bilateral knee pain today. He can not take NSAIDS. He gets no relief with tylenol. He reports nerve type pain at night after activity. He has had a previous back surgery.     No Known Allergies     Social History     Socioeconomic History    Marital status:    Tobacco Use    Smoking status: Never    Smokeless tobacco: Never   Vaping Use    Vaping Use: Never used   Substance and Sexual Activity    Alcohol use: Not Currently     Alcohol/week: 1.0 standard drink     Types: 1 Glasses of wine per week     Comment: Current some day    Drug use: Never    Sexual activity: Yes     Partners: Female     Birth control/protection: Surgical        I reviewed the patient's chief complaint, history of present illness, review of systems, past medical history, surgical history, family history, social history, medications, and allergy list.     REVIEW OF SYSTEMS    Constitutional: Denies fevers, chills, weight loss  Cardiovascular: Denies chest pain, shortness of breath  Skin: Denies rashes, acute skin changes  Neurologic: Denies headache, loss of consciousness  MSK: bilateral knee pain      Objective   Vital Signs:   /76   Pulse 88   Ht 180.3 cm (71\")   Wt 93 kg (205 lb)   SpO2 95%   BMI 28.59 kg/m²     Body mass index is 28.59 kg/m².    Physical Exam    General: Alert. No acute distress.   Right knee- ROM 0-120 degrees. Mild crepitus. Stable to varus/valgus stress. Stable to anterior/posterior drawer. Positive EHL, FHL, GS and TA. Sensation intact to all 5 nerves of the foot. Positive pulses. " No calf tenderness. No joint line pain. Negative Lachman's. Negative Laurie's.      Left knee- ROM -5 to 120 degrees. No effusion or joint line pain. Mild crepitus. Stable to varus/valgus stress. Stable to anterior/posterior drawer. Calf soft non-tender. Negative Laurie's. Negative Lachman's.. Positive EHL, FHL, GS and TA. Sensation intact to all 5 nerves of the foot. Positive pulses.     Procedures    Imaging Results (Most Recent)       None                     Assessment and Plan        No results found.     Diagnoses and all orders for this visit:    1. Bilateral primary osteoarthritis of knee (Primary)        Discussed the treatment plan with the patient.  Plan to continue conservative treatment at this time. Prescription for a medrol dose pack and Gabapentin given today. The patient expressed understanding and wished to proceed.     Call or return if worsening symptoms.    Scribed for Wolf Ellington MD by Sierra Camacho  06/19/2023   14:23 EDT         Follow Up       3 months    Patient was given instructions and counseling regarding his condition or for health maintenance advice. Please see specific information pulled into the AVS if appropriate.       I have personally performed the services described in this document as scribed by the above individual and it is both accurate and complete.     Wolf Ellington MD  06/19/23  14:31 EDT

## 2023-08-03 ENCOUNTER — LAB (OUTPATIENT)
Dept: LAB | Facility: HOSPITAL | Age: 73
End: 2023-08-03
Payer: MEDICARE

## 2023-08-03 DIAGNOSIS — M17.0 BILATERAL PRIMARY OSTEOARTHRITIS OF KNEE: ICD-10-CM

## 2023-08-03 DIAGNOSIS — Z95.0 PACEMAKER: ICD-10-CM

## 2023-08-03 DIAGNOSIS — N40.0 PROSTATIC HYPERTROPHY: ICD-10-CM

## 2023-08-03 DIAGNOSIS — J30.2 SEASONAL ALLERGIES: ICD-10-CM

## 2023-08-03 DIAGNOSIS — Z12.5 SCREENING PSA (PROSTATE SPECIFIC ANTIGEN): ICD-10-CM

## 2023-08-03 DIAGNOSIS — E55.9 VITAMIN D DEFICIENCY: ICD-10-CM

## 2023-08-03 DIAGNOSIS — I10 HYPERTENSION, ESSENTIAL: ICD-10-CM

## 2023-08-03 DIAGNOSIS — E78.2 MIXED HYPERLIPIDEMIA: ICD-10-CM

## 2023-08-03 LAB
25(OH)D3 SERPL-MCNC: 29 NG/ML (ref 30–100)
ALBUMIN SERPL-MCNC: 4.5 G/DL (ref 3.5–5.2)
ALBUMIN/GLOB SERPL: 1.7 G/DL
ALP SERPL-CCNC: 55 U/L (ref 39–117)
ALT SERPL W P-5'-P-CCNC: 27 U/L (ref 1–41)
ANION GAP SERPL CALCULATED.3IONS-SCNC: 10.5 MMOL/L (ref 5–15)
AST SERPL-CCNC: 25 U/L (ref 1–40)
BILIRUB SERPL-MCNC: 0.6 MG/DL (ref 0–1.2)
BUN SERPL-MCNC: 16 MG/DL (ref 8–23)
BUN/CREAT SERPL: 16.8 (ref 7–25)
CALCIUM SPEC-SCNC: 9.8 MG/DL (ref 8.6–10.5)
CHLORIDE SERPL-SCNC: 104 MMOL/L (ref 98–107)
CO2 SERPL-SCNC: 26.5 MMOL/L (ref 22–29)
CREAT SERPL-MCNC: 0.95 MG/DL (ref 0.76–1.27)
EGFRCR SERPLBLD CKD-EPI 2021: 83.5 ML/MIN/1.73
FOLATE SERPL-MCNC: 13.5 NG/ML (ref 4.78–24.2)
GLOBULIN UR ELPH-MCNC: 2.6 GM/DL
GLUCOSE SERPL-MCNC: 110 MG/DL (ref 65–99)
POTASSIUM SERPL-SCNC: 4.5 MMOL/L (ref 3.5–5.2)
PROT SERPL-MCNC: 7.1 G/DL (ref 6–8.5)
SODIUM SERPL-SCNC: 141 MMOL/L (ref 136–145)
VIT B12 BLD-MCNC: 281 PG/ML (ref 211–946)

## 2023-08-03 PROCEDURE — 82306 VITAMIN D 25 HYDROXY: CPT

## 2023-08-03 PROCEDURE — 82746 ASSAY OF FOLIC ACID SERUM: CPT

## 2023-08-03 PROCEDURE — 36415 COLL VENOUS BLD VENIPUNCTURE: CPT

## 2023-08-03 PROCEDURE — 80053 COMPREHEN METABOLIC PANEL: CPT

## 2023-08-03 PROCEDURE — 82607 VITAMIN B-12: CPT

## 2023-08-07 ENCOUNTER — OFFICE VISIT (OUTPATIENT)
Dept: INTERNAL MEDICINE | Facility: CLINIC | Age: 73
End: 2023-08-07
Payer: MEDICARE

## 2023-08-07 VITALS
TEMPERATURE: 98.2 F | BODY MASS INDEX: 28.11 KG/M2 | HEART RATE: 77 BPM | DIASTOLIC BLOOD PRESSURE: 80 MMHG | OXYGEN SATURATION: 92 % | SYSTOLIC BLOOD PRESSURE: 134 MMHG | HEIGHT: 71 IN | WEIGHT: 200.8 LBS

## 2023-08-07 DIAGNOSIS — Z00.00 MEDICARE ANNUAL WELLNESS VISIT, SUBSEQUENT: Primary | ICD-10-CM

## 2023-08-07 DIAGNOSIS — Z12.5 SCREENING PSA (PROSTATE SPECIFIC ANTIGEN): ICD-10-CM

## 2023-08-07 DIAGNOSIS — E55.9 VITAMIN D DEFICIENCY: ICD-10-CM

## 2023-08-07 DIAGNOSIS — M79.661 RIGHT CALF PAIN: ICD-10-CM

## 2023-08-07 DIAGNOSIS — G60.9 PERIPHERAL NEUROPATHY, IDIOPATHIC: ICD-10-CM

## 2023-08-07 DIAGNOSIS — I63.00 CEREBROVASCULAR ACCIDENT (CVA) DUE TO THROMBOSIS OF PRECEREBRAL ARTERY: ICD-10-CM

## 2023-08-07 DIAGNOSIS — E78.2 MIXED HYPERLIPIDEMIA: ICD-10-CM

## 2023-08-07 DIAGNOSIS — I10 HYPERTENSION, ESSENTIAL: ICD-10-CM

## 2023-08-07 DIAGNOSIS — R73.01 IFG (IMPAIRED FASTING GLUCOSE): ICD-10-CM

## 2023-08-07 DIAGNOSIS — Z95.0 PACEMAKER: ICD-10-CM

## 2023-08-07 PROCEDURE — 99214 OFFICE O/P EST MOD 30 MIN: CPT | Performed by: INTERNAL MEDICINE

## 2023-08-07 PROCEDURE — G0439 PPPS, SUBSEQ VISIT: HCPCS | Performed by: INTERNAL MEDICINE

## 2023-08-07 PROCEDURE — 3075F SYST BP GE 130 - 139MM HG: CPT | Performed by: INTERNAL MEDICINE

## 2023-08-07 PROCEDURE — 3079F DIAST BP 80-89 MM HG: CPT | Performed by: INTERNAL MEDICINE

## 2023-08-07 NOTE — PROGRESS NOTES
The ABCs of the Annual Wellness Visit  Subsequent Medicare Wellness Visit    Sera Alvarado is a 73 y.o. male who presents for a Subsequent Medicare Wellness Visit.    The following portions of the patient's history were reviewed and   updated as appropriate: allergies, current medications, past family history, past medical history, past social history, past surgical history, and problem list.    Compared to one year ago, the patient feels his physical   health is the same.    Compared to one year ago, the patient feels his mental   health is the same.    Recent Hospitalizations:  He was not admitted to the hospital during the last year.       Current Medical Providers:  Patient Care Team:  Tapan Pollard MD as PCP - General (Internal Medicine)    Outpatient Medications Prior to Visit   Medication Sig Dispense Refill    amLODIPine (NORVASC) 10 MG tablet Take 1 tablet by mouth Daily. 90 tablet 9    benazepril-hydrochlorthiazide (LOTENSIN HCT) 20-12.5 MG per tablet Take 1 tablet by mouth Daily. 90 tablet 1    cyclobenzaprine (FLEXERIL) 5 MG tablet Take 1 tablet by mouth 2 (Two) Times a Day As Needed for Muscle Spasms. 45 tablet 1    Eliquis 5 MG tablet tablet TAKE 1 TABLET BY MOUTH TWICE A  tablet 3    gabapentin (Neurontin) 100 MG capsule Take 1 capsule by mouth Every Night. 30 capsule 0    rosuvastatin (CRESTOR) 20 MG tablet Take 1 tablet by mouth Daily. 90 tablet 3    sildenafil (VIAGRA) 100 MG tablet Take 1 tablet by mouth As Needed for Erectile Dysfunction. 30 tablet 1    amLODIPine (NORVASC) 5 MG tablet       methylPREDNISolone (MEDROL) 4 MG dose pack Take 1 tablet by mouth Daily. Use as directed by package instructions 21 tablet 0     No facility-administered medications prior to visit.       No opioid medication identified on active medication list. I have reviewed chart for other potential  high risk medication/s and harmful drug interactions in the elderly.        Aspirin  "is not on active medication list.  Aspirin use is contraindicated for this patient due to: current use of Eliquis.  .    Patient Active Problem List   Diagnosis    Hypertension, essential    Presence of permanent cardiac pacemaker    Seasonal allergies    Prostatic hypertrophy    Pacemaker    Screening PSA (prostate specific antigen)    Hyperlipidemia    Longstanding persistent atrial fibrillation    Gastric ulcer due to nonsteroidal antiinflammatory drug (NSAID) therapy    Bilateral primary osteoarthritis of knee    Right calf pain    Vitamin D deficiency    Medicare annual wellness visit, subsequent    Cerebrovascular accident (CVA) due to thrombosis of precerebral artery    MELISSA NEUR IDOPA    IFG     Advance Care Planning   Advance Care Planning     Advance Directive is on file.  ACP discussion was held with the patient during this visit. Patient has an advance directive in EMR which is still valid.      Objective    Vitals:    08/07/23 1452   BP: 134/80   Pulse: 77   Temp: 98.2 øF (36.8 øC)   SpO2: 92%   Weight: 91.1 kg (200 lb 12.8 oz)   Height: 180.3 cm (70.98\")     Estimated body mass index is 28.02 kg/mý as calculated from the following:    Height as of this encounter: 180.3 cm (70.98\").    Weight as of this encounter: 91.1 kg (200 lb 12.8 oz).    BMI is >= 25 and <30. (Overweight) The following options were offered after discussion;: exercise counseling/recommendations and nutrition counseling/recommendations      Does the patient have evidence of cognitive impairment?   No            HEALTH RISK ASSESSMENT    Smoking Status:  Social History     Tobacco Use   Smoking Status Never   Smokeless Tobacco Never     Alcohol Consumption:  Social History     Substance and Sexual Activity   Alcohol Use Not Currently    Alcohol/week: 1.0 standard drink    Types: 1 Glasses of wine per week    Comment: Current some day     Fall Risk Screen:    STEADI Fall Risk Assessment was completed, and patient is at LOW risk for " falls.Assessment completed on:2023    Depression Screenin/5/2023     9:36 AM   PHQ-2/PHQ-9 Depression Screening   Little Interest or Pleasure in Doing Things 0-->not at all   Feeling Down, Depressed or Hopeless 0-->not at all   PHQ-9: Brief Depression Severity Measure Score 0       Health Habits and Functional and Cognitive Screenin/13/2021     4:00 PM   Functional & Cognitive Status   Do you have difficulty preparing food and eating? No   Do you have difficulty bathing yourself, getting dressed or grooming yourself? No   Do you have difficulty using the toilet? No   Do you have difficulty moving around from place to place? No   Do you have trouble with steps or getting out of a bed or a chair? No   Current Diet Well Balanced Diet   Dental Exam Up to date   Eye Exam Up to date   Exercise (times per week) 5 times per week   Current Exercises Include Weightlifting;Pickleball;Walking   Do you need help using the phone?  No   Are you deaf or do you have serious difficulty hearing?  Yes   Do you need help to go to places out of walking distance? No   Do you need help shopping? No   Do you need help preparing meals?  No   Do you need help with housework?  No   Do you need help with laundry? No   Do you need help taking your medications? No   Do you need help managing money? No   Do you ever drive or ride in a car without wearing a seat belt? No   Have you felt unusual stress, anger or loneliness in the last month? No   Who do you live with? Spouse   If you need help, do you have trouble finding someone available to you? No   Have you been bothered in the last four weeks by sexual problems? No   Do you have difficulty concentrating, remembering or making decisions? No       Age-appropriate Screening Schedule:  Refer to the list below for future screening recommendations based on patient's age, sex and/or medical conditions. Orders for these recommended tests are listed in the plan section. The  patient has been provided with a written plan.    Health Maintenance   Topic Date Due    ZOSTER VACCINE (3 of 3) 07/02/2018    COVID-19 Vaccine (6 - Moderna series) 02/25/2023    INFLUENZA VACCINE  10/01/2023    LIPID PANEL  01/06/2024    ANNUAL WELLNESS VISIT  08/07/2024    COLORECTAL CANCER SCREENING  05/12/2026    TDAP/TD VACCINES (2 - Td or Tdap) 10/17/2032    HEPATITIS C SCREENING  Completed    Pneumococcal Vaccine 65+  Completed                  CMS Preventative Services Quick Reference  Risk Factors Identified During Encounter:    None Identified    The above risks/problems have been discussed with the patient.  Pertinent information has been shared with the patient in the After Visit Summary.    Diagnoses and all orders for this visit:    1. Medicare annual wellness visit, subsequent (Primary)  -     Lipid Panel; Future  -     Comprehensive Metabolic Panel; Future  -     CBC & Differential; Future  -     Hemoglobin A1c; Future  -     Vitamin B12 anemia; Future  -     Folate anemia; Future  -     PSA Screen; Future    2. Screening PSA (prostate specific antigen)  -     Lipid Panel; Future  -     Comprehensive Metabolic Panel; Future  -     CBC & Differential; Future  -     Hemoglobin A1c; Future  -     Vitamin B12 anemia; Future  -     Folate anemia; Future  -     PSA Screen; Future    3. Vitamin D deficiency  -     Lipid Panel; Future  -     Comprehensive Metabolic Panel; Future  -     CBC & Differential; Future  -     Hemoglobin A1c; Future  -     Vitamin B12 anemia; Future  -     Folate anemia; Future  -     PSA Screen; Future    4. Pacemaker  -     Lipid Panel; Future  -     Comprehensive Metabolic Panel; Future  -     CBC & Differential; Future  -     Hemoglobin A1c; Future  -     Vitamin B12 anemia; Future  -     Folate anemia; Future  -     PSA Screen; Future    5. Hypertension, essential  -     Lipid Panel; Future  -     Comprehensive Metabolic Panel; Future  -     CBC & Differential; Future  -      Hemoglobin A1c; Future  -     Vitamin B12 anemia; Future  -     Folate anemia; Future  -     PSA Screen; Future    6. Mixed hyperlipidemia  -     Lipid Panel; Future  -     Comprehensive Metabolic Panel; Future  -     CBC & Differential; Future  -     Hemoglobin A1c; Future  -     Vitamin B12 anemia; Future  -     Folate anemia; Future  -     PSA Screen; Future    7. Cerebrovascular accident (CVA) due to thrombosis of precerebral artery  -     Lipid Panel; Future  -     Comprehensive Metabolic Panel; Future  -     CBC & Differential; Future  -     Hemoglobin A1c; Future  -     Vitamin B12 anemia; Future  -     Folate anemia; Future  -     PSA Screen; Future    8. Right calf pain  -     Lipid Panel; Future  -     Comprehensive Metabolic Panel; Future  -     CBC & Differential; Future  -     Hemoglobin A1c; Future  -     Vitamin B12 anemia; Future  -     Folate anemia; Future  -     PSA Screen; Future    9. MELISSA NEUR IDOPA  -     Lipid Panel; Future  -     Comprehensive Metabolic Panel; Future  -     CBC & Differential; Future  -     Hemoglobin A1c; Future  -     Vitamin B12 anemia; Future  -     Folate anemia; Future  -     PSA Screen; Future    10. IFG  -     Lipid Panel; Future  -     Comprehensive Metabolic Panel; Future  -     CBC & Differential; Future  -     Hemoglobin A1c; Future  -     Vitamin B12 anemia; Future  -     Folate anemia; Future  -     PSA Screen; Future        Follow Up:   Next Medicare Wellness visit to be scheduled in 1 year.      An After Visit Summary and PPPS were made available to the patient.          Answers submitted by the patient for this visit:  Primary Reason for Visit (Submitted on 8/1/2023)  What is the primary reason for your visit?: High Blood Pressure  High Blood Pressure Questionnaire (Submitted on 8/1/2023)  Chief Complaint: Hypertension  Chronicity: recurrent  Onset: more than 1 month ago  Progression since onset: gradually improving  Condition status: controlled  anxiety:  No  blurred vision: No  chest pain: No  headaches: No  malaise/fatigue: No  neck pain: No  orthopnea: No  palpitations: No  peripheral edema: No  PND: No  shortness of breath: No  sweats: No  Agents associated with hypertension: NSAIDs  CAD risks: diabetes mellitus  Compliance problems: exercise

## 2023-08-07 NOTE — PROGRESS NOTES
"CHIEF COMPLAINT/ HPI:  Hyperlipidemia and Follow-up (Patient is here for a routine 6 month follow up)    Tells me dr trisha increased norvasc to 10 mg qd in may 2023    Still with some right leg and calf pain, got better with gabapentin from the orthopedic doctor, he is can hold off on neurosurgical evaluation given his previous back surgeries.  [place],      Objective   Vital Signs  Vitals:    08/07/23 1452   BP: 134/80   Pulse: 77   Temp: 98.2 øF (36.8 øC)   SpO2: 92%   Weight: 91.1 kg (200 lb 12.8 oz)   Height: 180.3 cm (70.98\")      Body mass index is 28.02 kg/mý.  Review of Systems   Constitutional: Negative.    HENT: Negative.     Eyes: Negative.  Negative for blurred vision.   Respiratory: Negative.  Negative for shortness of breath.    Cardiovascular: Negative.  Negative for chest pain and palpitations.   Gastrointestinal: Negative.    Endocrine: Negative.    Genitourinary: Negative.    Musculoskeletal: Negative.  Negative for neck pain.   Allergic/Immunologic: Negative.    Neurological: Negative.    Hematological: Negative.    Psychiatric/Behavioral: Negative.      Physical Exam  Constitutional:       General: He is not in acute distress.     Appearance: Normal appearance.   HENT:      Head: Normocephalic.      Mouth/Throat:      Mouth: Mucous membranes are moist.   Eyes:      Conjunctiva/sclera: Conjunctivae normal.      Pupils: Pupils are equal, round, and reactive to light.   Cardiovascular:      Rate and Rhythm: Normal rate and regular rhythm.      Pulses: Normal pulses.      Heart sounds: Normal heart sounds.   Pulmonary:      Effort: Pulmonary effort is normal.      Breath sounds: Normal breath sounds.   Abdominal:      General: Abdomen is flat. Bowel sounds are normal.      Palpations: Abdomen is soft.   Musculoskeletal:         General: No swelling. Normal range of motion.      Cervical back: Neck supple.   Skin:     General: Skin is warm and dry.      Coloration: " Skin is not jaundiced.   Neurological:      General: No focal deficit present.      Mental Status: He is alert and oriented to person, place, and time. Mental status is at baseline.   Psychiatric:         Mood and Affect: Mood normal.         Behavior: Behavior normal.         Thought Content: Thought content normal.         Judgment: Judgment normal.      Result Review :   No results found for: PROBNP, BNP  CMP          1/6/2023    10:21 8/3/2023    09:38   CMP   Glucose 106  110    BUN 22  16    Creatinine 0.96  0.95    EGFR 82.9  83.5    Sodium 140  141    Potassium 4.3  4.5    Chloride 102  104    Calcium 9.4  9.8    Total Protein 7.6  7.1    Albumin 4.6  4.5    Globulin 3.0  2.6    Total Bilirubin 0.4  0.6    Alkaline Phosphatase 60  55    AST (SGOT) 29  25    ALT (SGPT) 33  27    Albumin/Globulin Ratio 1.5  1.7    BUN/Creatinine Ratio 22.9  16.8    Anion Gap 8.2  10.5      CBC w/diff          1/6/2023    10:21   CBC w/Diff   WBC 8.12    RBC 5.00    Hemoglobin 15.1    Hematocrit 44.7    MCV 89.4    MCH 30.2    MCHC 33.8    RDW 12.2    Platelets 278    Neutrophil Rel % 45.7    Immature Granulocyte Rel % 0.5    Lymphocyte Rel % 43.2    Monocyte Rel % 9.1    Eosinophil Rel % 0.9    Basophil Rel % 0.6       Lipid Panel          1/6/2023    10:21   Lipid Panel   Total Cholesterol 138    Triglycerides 60    HDL Cholesterol 60    VLDL Cholesterol 13    LDL Cholesterol  65    LDL/HDL Ratio 1.10       Lab Results   Component Value Date    TSH 2.110 01/06/2023    TSH 2.720 12/13/2021    TSH 2.410 11/03/2020      Lab Results   Component Value Date    FREET4 1.22 01/06/2023         PSA          1/6/2023    10:21   PSA   PSA 2.820                     Visit Diagnoses:    ICD-10-CM ICD-9-CM   1. Medicare annual wellness visit, subsequent  Z00.00 V70.0   2. Screening PSA (prostate specific antigen)  Z12.5 V76.44   3. Vitamin D deficiency  E55.9 268.9   4. Pacemaker  Z95.0 V45.01   5. Hypertension, essential  I10 401.9   6.  Mixed hyperlipidemia  E78.2 272.2   7. Cerebrovascular accident (CVA) due to thrombosis of precerebral artery  I63.00 433.91   8. Right calf pain  M79.661 729.5   9. MELISSA NEUR IDOPA  G60.9 356.9   10. IFG  R73.01 790.21       Assessment and Plan   Diagnoses and all orders for this visit:    1. Medicare annual wellness visit, subsequent (Primary)  -     Lipid Panel; Future  -     Comprehensive Metabolic Panel; Future  -     CBC & Differential; Future  -     Hemoglobin A1c; Future  -     Vitamin B12 anemia; Future  -     Folate anemia; Future  -     PSA Screen; Future    2. Screening PSA (prostate specific antigen)  -     Lipid Panel; Future  -     Comprehensive Metabolic Panel; Future  -     CBC & Differential; Future  -     Hemoglobin A1c; Future  -     Vitamin B12 anemia; Future  -     Folate anemia; Future  -     PSA Screen; Future    3. Vitamin D deficiency  -     Lipid Panel; Future  -     Comprehensive Metabolic Panel; Future  -     CBC & Differential; Future  -     Hemoglobin A1c; Future  -     Vitamin B12 anemia; Future  -     Folate anemia; Future  -     PSA Screen; Future    4. Pacemaker  -     Lipid Panel; Future  -     Comprehensive Metabolic Panel; Future  -     CBC & Differential; Future  -     Hemoglobin A1c; Future  -     Vitamin B12 anemia; Future  -     Folate anemia; Future  -     PSA Screen; Future    5. Hypertension, essential  -     Lipid Panel; Future  -     Comprehensive Metabolic Panel; Future  -     CBC & Differential; Future  -     Hemoglobin A1c; Future  -     Vitamin B12 anemia; Future  -     Folate anemia; Future  -     PSA Screen; Future    6. Mixed hyperlipidemia  -     Lipid Panel; Future  -     Comprehensive Metabolic Panel; Future  -     CBC & Differential; Future  -     Hemoglobin A1c; Future  -     Vitamin B12 anemia; Future  -     Folate anemia; Future  -     PSA Screen; Future    7. Cerebrovascular accident (CVA) due to thrombosis of precerebral artery  -     Lipid Panel;  Future  -     Comprehensive Metabolic Panel; Future  -     CBC & Differential; Future  -     Hemoglobin A1c; Future  -     Vitamin B12 anemia; Future  -     Folate anemia; Future  -     PSA Screen; Future    8. Right calf pain  -     Lipid Panel; Future  -     Comprehensive Metabolic Panel; Future  -     CBC & Differential; Future  -     Hemoglobin A1c; Future  -     Vitamin B12 anemia; Future  -     Folate anemia; Future  -     PSA Screen; Future    9. MELISSA NEUR IDOPA  -     Lipid Panel; Future  -     Comprehensive Metabolic Panel; Future  -     CBC & Differential; Future  -     Hemoglobin A1c; Future  -     Vitamin B12 anemia; Future  -     Folate anemia; Future  -     PSA Screen; Future    10. IFG  -     Lipid Panel; Future  -     Comprehensive Metabolic Panel; Future  -     CBC & Differential; Future  -     Hemoglobin A1c; Future  -     Vitamin B12 anemia; Future  -     Folate anemia; Future  -     PSA Screen; Future             Lumbar djd, back pain-,--prior back surgery- x 3 with fusion, --possible sciatica? R calf pain, better with neuronitn, 100 mg nightly, as of August 2023 improved    Cad, mi -2011, cath     Impaired fasting glucose, glucose of 110 August 3, 2023, will do hemoglobin A1c at next visit in 6 months,    Vitamin B12 deficiency, recommend vitamin B12 1000 mcg daily indefinitely    Sss, pacemaker age 36--dr rico reg.     Ed , cont viagra     Stroke --with left arm weakness, 2020, - continues eliquis 5 mg twice a day, by dr sahu ,  dr rico said to stay on it May 2023    Hypertension---  continues benazepril HCTZ 20-12.5 mg daily, Norvasc 10 mg nightly,, Norvasc dose added and increased over the past 6 months by myself and cardiology,------------- nuclear medicine stress test, May 2023 ==with myocardial perfusion showed normal ejection fraction normal mild cardial perfusion study, low risk study, normal EKG stress test    PSA value 2.8, January 6, 2023    Hyperlipidemia continues   rosuvastatin 20 mg daily    Right calf pain at night, possible sciatica from his back issues, saw Dr. Ellington, gabapentin ordered seems to be helping at bedtime, August 2023    Knee oa ---dr ellington, cortisone inject ---, improved, December 2022, venous evaluation right lower leg negative for DVT December 2022    PUD , C SCOPE AND  egd 8/2021, dr earl          Follow Up   Return in about 6 months (around 2/7/2024).  Patient was given instructions and counseling regarding his condition or for health maintenance advice. Please see specific information pulled into the AVS if appropriate.       Answers submitted by the patient for this visit:  Primary Reason for Visit (Submitted on 8/1/2023)  What is the primary reason for your visit?: High Blood Pressure  High Blood Pressure Questionnaire (Submitted on 8/1/2023)  Chief Complaint: Hypertension  Chronicity: recurrent  Onset: more than 1 month ago  Progression since onset: gradually improving  Condition status: controlled  anxiety: No  headaches: No  malaise/fatigue: No  orthopnea: No  peripheral edema: No  PND: No  sweats: No  Agents associated with hypertension: NSAIDs  CAD risks: diabetes mellitus  Compliance problems: exercise

## 2023-08-13 DIAGNOSIS — M17.0 BILATERAL PRIMARY OSTEOARTHRITIS OF KNEE: ICD-10-CM

## 2023-08-14 DIAGNOSIS — M17.0 BILATERAL PRIMARY OSTEOARTHRITIS OF KNEE: ICD-10-CM

## 2023-08-14 RX ORDER — GABAPENTIN 100 MG/1
100 CAPSULE ORAL NIGHTLY
Qty: 30 CAPSULE | Refills: 0 | Status: SHIPPED | OUTPATIENT
Start: 2023-08-14

## 2023-08-14 RX ORDER — GABAPENTIN 100 MG/1
CAPSULE ORAL
Qty: 30 CAPSULE | Refills: 0 | Status: SHIPPED | OUTPATIENT
Start: 2023-08-14 | End: 2023-08-14 | Stop reason: SDUPTHER

## 2023-08-15 DIAGNOSIS — M79.661 RIGHT CALF PAIN: Primary | ICD-10-CM

## 2023-08-15 RX ORDER — GABAPENTIN 100 MG/1
100 CAPSULE ORAL NIGHTLY
Qty: 15 CAPSULE | Refills: 0 | Status: SHIPPED | OUTPATIENT
Start: 2023-08-15

## 2023-08-29 NOTE — TELEPHONE ENCOUNTER
Patient needs medication refill for this, it was last sent from another provider, I have pended this for you to send.    Patient ask for a sample box, gave him 1.

## 2023-08-30 ENCOUNTER — TELEPHONE (OUTPATIENT)
Dept: ORTHOPEDIC SURGERY | Facility: CLINIC | Age: 73
End: 2023-08-30
Payer: MEDICARE

## 2023-08-30 DIAGNOSIS — M17.0 BILATERAL PRIMARY OSTEOARTHRITIS OF KNEE: ICD-10-CM

## 2023-08-30 RX ORDER — GABAPENTIN 100 MG/1
100 CAPSULE ORAL 2 TIMES DAILY PRN
Qty: 30 CAPSULE | Refills: 0 | Status: SHIPPED | OUTPATIENT
Start: 2023-08-30

## 2023-08-30 NOTE — TELEPHONE ENCOUNTER
Nurse practitioner Michell states she is calling because she has questions regarding patient's prescriptions and recommendations for eliquis and pletal. States patient was told to discontinue eliquis after 3-6 months and continue pletal. States she is concerned that patient may not be on correct doseage and she feels 3-6 months time frame to stop eliquis is vague. Would like to speak with Dr. Roldan when he is back in office on Monday. Notified NP that message will be sent to Dr. Roldan and NP will be contacted with response. Michell verbalizes understanding.   PATIENT CALLED AND STATES HE WILL BE OUT OF THE COUNTRY AND NEEDS TO HAVE A NEW GABAPENTIN SCRIP SENT TO [facility] SO HE DOESN'T RUN OUT.  GOT 15 ON 8-15-23. PATIENT STATES HE WAS TOLD BY DR SANTOS THAT HE COULD TAKE MORE THAN ONE A DAY IF NEEDED.

## 2023-09-25 ENCOUNTER — OFFICE VISIT (OUTPATIENT)
Dept: ORTHOPEDIC SURGERY | Facility: CLINIC | Age: 73
End: 2023-09-25

## 2023-09-25 VITALS
WEIGHT: 197 LBS | BODY MASS INDEX: 28.2 KG/M2 | HEIGHT: 70 IN | SYSTOLIC BLOOD PRESSURE: 141 MMHG | DIASTOLIC BLOOD PRESSURE: 81 MMHG | OXYGEN SATURATION: 93 % | HEART RATE: 87 BPM

## 2023-09-25 DIAGNOSIS — M17.0 BILATERAL PRIMARY OSTEOARTHRITIS OF KNEE: Primary | ICD-10-CM

## 2023-09-25 RX ORDER — LIDOCAINE HYDROCHLORIDE 10 MG/ML
5 INJECTION, SOLUTION INFILTRATION; PERINEURAL
Status: COMPLETED | OUTPATIENT
Start: 2023-09-25 | End: 2023-09-25

## 2023-09-25 RX ADMIN — LIDOCAINE HYDROCHLORIDE 5 ML: 10 INJECTION, SOLUTION INFILTRATION; PERINEURAL at 14:45

## 2023-09-25 NOTE — PROGRESS NOTES
"Chief Complaint  Pain and Follow-up of the Left Knee and Pain and Follow-up of the Right Knee    Subjective          Alexandre Alvarado presents to [facility] for   History of Present Illness    The patient presents here today for follow up evaluation of the bilateral knees. The patient has been treating his bilateral knee osteoarthritis conservatively. He has had injections in the past. He reports his pain has returned.     No Known Allergies     Social History     Socioeconomic History   • Marital status:    Tobacco Use   • Smoking status: Never   • Smokeless tobacco: Never   Vaping Use   • Vaping Use: Never used   Substance and Sexual Activity   • Alcohol use: Not Currently     Alcohol/week: 1.0 standard drink     Types: 1 Glasses of wine per week     Comment: Current some day   • Drug use: Never   • Sexual activity: Yes     Partners: Female     Birth control/protection: Surgical        I reviewed the patient's chief complaint, history of present illness, review of systems, past medical history, surgical history, family history, social history, medications, and allergy list.     REVIEW OF SYSTEMS    Constitutional: Denies fevers, chills, weight loss  Cardiovascular: Denies chest pain, shortness of breath  Skin: Denies rashes, acute skin changes  Neurologic: Denies headache, loss of consciousness  MSK: bilateral knee pain      Objective   Vital Signs:   /81   Pulse 87   Ht 177.8 cm (70\")   Wt 89.4 kg (197 lb)   SpO2 93%   BMI 28.27 kg/m²     Body mass index is 28.27 kg/m².    Physical Exam    General: Alert. No acute distress.   Right knee- ROM 0-120 degrees. Mild crepitus. Stable to varus/valgus stress. Stable to anterior/posterior drawer. Positive EHL, FHL, GS and TA. Sensation intact to all 5 nerves of the foot. Positive pulses. No calf tenderness. No joint line pain. Negative Lachman's. Negative Laurie's.      Left knee- ROM -5 to 120 degrees. No effusion or " joint line pain. Mild crepitus. Stable to varus/valgus stress. Stable to anterior/posterior drawer. Calf soft non-tender. Negative Laurie's. Negative Lachman's.. Positive EHL, FHL, GS and TA. Sensation intact to all 5 nerves of the foot. Positive pulses.     Large Joint Arthrocentesis: R knee  Date/Time: 9/25/2023 2:45 PM  Consent given by: patient  Site marked: site marked  Timeout: Immediately prior to procedure a time out was called to verify the correct patient, procedure, equipment, support staff and site/side marked as required   Supporting Documentation  Indications: pain   Procedure Details  Location: knee - R knee  Needle gauge: 21 G.  Medications administered: 5 mL lidocaine 1 %; 32 mg Triamcinolone Acetonide 32 MG  Patient tolerance: patient tolerated the procedure well with no immediate complications      Large Joint Arthrocentesis: L knee  Date/Time: 9/25/2023 2:45 PM  Consent given by: patient  Site marked: site marked  Timeout: Immediately prior to procedure a time out was called to verify the correct patient, procedure, equipment, support staff and site/side marked as required   Supporting Documentation  Indications: pain   Procedure Details  Location: knee - L knee  Needle gauge: 21 G.  Medications administered: 5 mL lidocaine 1 %; 32 mg Triamcinolone Acetonide 32 MG  Patient tolerance: patient tolerated the procedure well with no immediate complications      Imaging Results (Most Recent)       None                     Assessment and Plan        No results found.     Diagnoses and all orders for this visit:    1. Bilateral primary osteoarthritis of knee (Primary)        Discussed the treatment plan with the patient.  Discussed the risks and benefits of bilateral knee Zilretta injections. The patient expressed understanding and wished to proceed. He tolerated the injections well.       Call or return if worsening symptoms.    Scribed for Wolf Ellington MD by Sierra Camacho  09/25/2023   14:34  EDT         Follow Up       3 months    Patient was given instructions and counseling regarding his condition or for health maintenance advice. Please see specific information pulled into the AVS if appropriate.       I have personally performed the services described in this document as scribed by the above individual and it is both accurate and complete.     Wolf Ellington MD  09/25/23  14:38 EDT

## 2023-10-09 DIAGNOSIS — M17.0 BILATERAL PRIMARY OSTEOARTHRITIS OF KNEE: ICD-10-CM

## 2023-10-09 RX ORDER — GABAPENTIN 100 MG/1
100 CAPSULE ORAL 2 TIMES DAILY PRN
Qty: 30 CAPSULE | Refills: 0 | Status: SHIPPED | OUTPATIENT
Start: 2023-10-09

## 2023-10-27 PROBLEM — I48.0 PAROXYSMAL ATRIAL FIBRILLATION: Status: ACTIVE | Noted: 2021-12-13

## 2023-10-27 PROBLEM — M79.661 RIGHT CALF PAIN: Status: RESOLVED | Noted: 2022-11-28 | Resolved: 2023-10-27

## 2023-10-27 PROBLEM — Z12.5 SCREENING PSA (PROSTATE SPECIFIC ANTIGEN): Status: RESOLVED | Noted: 2020-08-11 | Resolved: 2023-10-27

## 2023-10-27 PROBLEM — Z95.0 PRESENCE OF PERMANENT CARDIAC PACEMAKER: Status: RESOLVED | Noted: 2021-12-12 | Resolved: 2023-10-27

## 2023-10-27 NOTE — PROGRESS NOTES
Chief Complaint  Follow-up 6 month. No concerns. device check; Hypertension, essential; and Paroxysmal atrial fibrillation    Subjective            History of Present Illness  Alexandre Alvarado is a 73-year-old white/ male patient who presents to the office today for follow-up.  He has paroxysmal atrial fibrillation, hypertension, hyperlipidemia, and presence of pacemaker.  He reports had some swelling in the left ankle for about a month which ended up resolving on its own. He is compliant with medications.  He denies any chest pain, shortness of breath, lightheadedness/dizziness, or palpitations.    PMH  Past Medical History:   Diagnosis Date    Abdominal aortic aneurysm (AAA)     Advance directive discussed with patient 08/11/2020    Arthritis     Bursitis of hip 2019    Cataract 2021    Corrected    Depression     Diverticulosis     Erectile dysfunction 2019    Fracture, finger 1967    Great toe pain, left 08/11/2020    History of colonoscopy 08/11/2020    Hyperlipidemia     Hypertension, essential     Knee sprain 2022    Both knees left May. Right July    Lumbosacral disc disease 1992    Operations  1992 1998 and 2000 when fused    Myocardial infarction 2011    Pacemaker 08/11/2020    Paroxysmal atrial fibrillation 12/13/2021    Prostatic hypertrophy     Benign    Rectal bleeding     Seasonal allergies     Stomach ulcer     Stroke 2019    Tendinitis of knee 2022    Tennis elbow 1988    Various times         ALLERGY  No Known Allergies       SURGICALHX  Past Surgical History:   Procedure Laterality Date    BACK SURGERY      CARDIAC CATHETERIZATION  Nov2011    CARDIAC SURGERY      COLONOSCOPY  2016    Dr Ponce    ENDOSCOPY      ENDOSCOPY N/A 08/10/2021    Procedure: ESOPHAGOGASTRODUODENOSCOPY;  Surgeon: Reginald Camarillo MD;  Location: [facility] END[facility];  Service: Gastroenterology;  Laterality: N/A;  hiatal hernia    INSERT / REPLACE / REMOVE PACEMAKER      SPINE SURGERY  1992 1998 2000    TRIGGER  POINT INJECTION  2010    Others dates. Hand foot injections          SOC  Social History     Socioeconomic History    Marital status:    Tobacco Use    Smoking status: Never    Smokeless tobacco: Never   Vaping Use    Vaping Use: Never used   Substance and Sexual Activity    Alcohol use: Yes     Alcohol/week: 1.0 standard drink of alcohol     Types: 1 Glasses of wine per week     Comment: Current some day    Drug use: Never    Sexual activity: Yes     Partners: Female     Birth control/protection: Vasectomy, Surgical         FAMHX  Family History   Problem Relation Age of Onset    Cancer Mother         Passed away. Sever arthritis and dementia brest cancer    Osteoporosis Mother     Arthritis Mother     COPD Father     Stroke Father     Heart disease Father     Cancer Father         Passed COPD. Smoking    Diabetes Other     Heart failure Other     Other Other         spine problems    Malig Hyperthermia Neg Hx           MEDSIGONLY  Current Outpatient Medications on File Prior to Visit   Medication Sig    apixaban (Eliquis) 5 MG tablet tablet Take 1 tablet by mouth 2 (Two) Times a Day.    benazepril-hydrochlorthiazide (LOTENSIN HCT) 20-12.5 MG per tablet Take 1 tablet by mouth Daily.    cyclobenzaprine (FLEXERIL) 5 MG tablet Take 1 tablet by mouth 2 (Two) Times a Day As Needed for Muscle Spasms.    gabapentin (NEURONTIN) 100 MG capsule Take 1 capsule by mouth 2 (Two) Times a Day As Needed (Pain).    rosuvastatin (CRESTOR) 20 MG tablet Take 1 tablet by mouth Daily.    sildenafil (VIAGRA) 100 MG tablet Take 1 tablet by mouth As Needed for Erectile Dysfunction.    amLODIPine (NORVASC) 10 MG tablet Take 1 tablet by mouth Daily.    [DISCONTINUED] gabapentin (NEURONTIN) 100 MG capsule Take 1 capsule by mouth Every Night.     No current facility-administered medications on file prior to visit.         Objective   /72 (BP Location: Left arm, Patient Position: Sitting, Cuff Size: Large Adult)   Pulse 75   Ht  "177.8 cm (70\")   Wt 90.3 kg (199 lb)   BMI 28.55 kg/m²       Physical Exam  HENT:      Head: Normocephalic.   Neck:      Vascular: No carotid bruit.   Cardiovascular:      Rate and Rhythm: Normal rate and regular rhythm.      Pulses: Normal pulses.      Heart sounds: Normal heart sounds. No murmur heard.  Pulmonary:      Effort: Pulmonary effort is normal.      Breath sounds: Normal breath sounds.   Musculoskeletal:      Cervical back: Neck supple.      Right lower leg: No edema.      Left lower leg: No edema.   Skin:     General: Skin is dry.   Neurological:      Mental Status: He is alert and oriented to person, place, and time.   Psychiatric:         Behavior: Behavior normal.       Result Review :   The following data was reviewed by: TA Romano on 10/31/2023:  No results found for: \"PROBNP\"  CMP          8/3/2023    09:38   CMP   Glucose 110    BUN 16    Creatinine 0.95    EGFR 83.5    Sodium 141    Potassium 4.5    Chloride 104    Calcium 9.8    Total Protein 7.1    Albumin 4.5    Globulin 2.6    Total Bilirubin 0.6    Alkaline Phosphatase 55    AST (SGOT) 25    ALT (SGPT) 27    Albumin/Globulin Ratio 1.7    BUN/Creatinine Ratio 16.8    Anion Gap 10.5      CBC w/diff          1/6/2023    10:21   CBC w/Diff   WBC 8.12    RBC 5.00    Hemoglobin 15.1    Hematocrit 44.7    MCV 89.4    MCH 30.2    MCHC 33.8    RDW 12.2    Platelets 278    Neutrophil Rel % 45.7    Immature Granulocyte Rel % 0.5    Lymphocyte Rel % 43.2    Monocyte Rel % 9.1    Eosinophil Rel % 0.9    Basophil Rel % 0.6       Lab Results   Component Value Date    TSH 2.110 01/06/2023      Lab Results   Component Value Date    FREET4 1.22 01/06/2023      No results found for: \"DDIMERQUANT\"  No results found for: \"MG\"   No results found for: \"DIGOXIN\"   No results found for: \"TROPONINT\"        Lipid Panel          1/6/2023    10:21   Lipid Panel   Total Cholesterol 138    Triglycerides 60    HDL Cholesterol 60    VLDL Cholesterol 13    LDL " Cholesterol  65    LDL/HDL Ratio 1.10        Results for orders placed during the hospital encounter of 05/09/23    Adult Transthoracic Echo Complete W/ Cont if Necessary Per Protocol    Interpretation Summary  Left ventricular hypertrophy with normal left ventricular systolic function.  No significant valve abnormalities noted.         Assessment and Plan    Diagnoses and all orders for this visit:    1. Paroxysmal atrial fibrillation (Primary)  Symptomatically stable at this time, rate controlled, continue Eliquis for CVA prevention.    2. Hypertension, essential  Currently controlled without adverse effects from medication, continue amlodipine 10 mg daily and benazepril HCTZ 20-12.5 mg daily.    3. Mixed hyperlipidemia  Last lipid panel was 1/6/2023 with LDL 65 which is within goal range, continue rosuvastatin 20 mg daily.    4. Pacemaker  Device was interrogated in office today.  He has old unipolar leads that are 30+ years in age with some noise on the lead at times.  He is not pacemaker dependent.  He has 8 years left on the battery.          Follow Up   Return in about 6 months (around 4/30/2024) for Follow up with Dr Dahl.    Patient was given instructions and counseling regarding his condition or for health maintenance advice. Please see specific information pulled into the AVS if appropriate.     Alexandre Alvarado  reports that he has never smoked. He has never used smokeless tobacco.           Shannan Berkowitz, APRN  10/31/23  15:47 EDT    Dictated Utilizing Dragon Dictation

## 2023-10-31 ENCOUNTER — OFFICE VISIT (OUTPATIENT)
Dept: CARDIOLOGY | Facility: CLINIC | Age: 73
End: 2023-10-31
Payer: MEDICARE

## 2023-10-31 ENCOUNTER — CLINICAL SUPPORT NO REQUIREMENTS (OUTPATIENT)
Dept: CARDIOLOGY | Facility: CLINIC | Age: 73
End: 2023-10-31
Payer: MEDICARE

## 2023-10-31 VITALS
BODY MASS INDEX: 28.49 KG/M2 | DIASTOLIC BLOOD PRESSURE: 72 MMHG | SYSTOLIC BLOOD PRESSURE: 136 MMHG | WEIGHT: 199 LBS | HEART RATE: 75 BPM | HEIGHT: 70 IN

## 2023-10-31 DIAGNOSIS — Z95.0 PACEMAKER: ICD-10-CM

## 2023-10-31 DIAGNOSIS — E78.2 MIXED HYPERLIPIDEMIA: ICD-10-CM

## 2023-10-31 DIAGNOSIS — I10 HYPERTENSION, ESSENTIAL: ICD-10-CM

## 2023-10-31 DIAGNOSIS — Z95.0 PACEMAKER: Primary | ICD-10-CM

## 2023-10-31 DIAGNOSIS — I48.0 PAROXYSMAL ATRIAL FIBRILLATION: Primary | ICD-10-CM

## 2023-10-31 PROBLEM — I49.5 SSS (SICK SINUS SYNDROME): Status: ACTIVE | Noted: 2023-10-31

## 2023-10-31 PROCEDURE — 1160F RVW MEDS BY RX/DR IN RCRD: CPT | Performed by: NURSE PRACTITIONER

## 2023-10-31 PROCEDURE — 1159F MED LIST DOCD IN RCRD: CPT | Performed by: NURSE PRACTITIONER

## 2023-10-31 PROCEDURE — 3078F DIAST BP <80 MM HG: CPT | Performed by: NURSE PRACTITIONER

## 2023-10-31 PROCEDURE — 3075F SYST BP GE 130 - 139MM HG: CPT | Performed by: NURSE PRACTITIONER

## 2023-10-31 PROCEDURE — 99214 OFFICE O/P EST MOD 30 MIN: CPT | Performed by: NURSE PRACTITIONER

## 2023-10-31 NOTE — PROGRESS NOTES
Normal VVI Chamber Pacemaker Device Interrogation and Device Testing.  Normal evaluation of device function and lead measurements.  No optimization was needed of parameters or maximization of device longevity.  Patient is on automated Home Remote Monitoring.  Patient has Olde Unipolar leads that are 32 to 33 years old.  Noise occasionally appears on ventricular lead and I believe that there is no capture on Atrial lead and it is capped.

## 2023-11-06 DIAGNOSIS — M17.0 BILATERAL PRIMARY OSTEOARTHRITIS OF KNEE: ICD-10-CM

## 2023-11-06 RX ORDER — BENAZEPRIL HYDROCHLORIDE AND HYDROCHLOROTHIAZIDE 20; 12.5 MG/1; MG/1
1 TABLET ORAL DAILY
Qty: 90 TABLET | Refills: 1 | Status: SHIPPED | OUTPATIENT
Start: 2023-11-06

## 2023-11-06 RX ORDER — SILDENAFIL 100 MG/1
100 TABLET, FILM COATED ORAL AS NEEDED
Qty: 30 TABLET | Refills: 1 | Status: SHIPPED | OUTPATIENT
Start: 2023-11-06

## 2023-11-06 RX ORDER — ROSUVASTATIN CALCIUM 20 MG/1
20 TABLET, COATED ORAL DAILY
Qty: 90 TABLET | Refills: 3 | Status: SHIPPED | OUTPATIENT
Start: 2023-11-06

## 2023-11-06 RX ORDER — GABAPENTIN 100 MG/1
100 CAPSULE ORAL 2 TIMES DAILY PRN
Qty: 30 CAPSULE | Refills: 0 | Status: SHIPPED | OUTPATIENT
Start: 2023-11-06

## 2023-11-06 RX ORDER — CYCLOBENZAPRINE HCL 5 MG
5 TABLET ORAL 2 TIMES DAILY PRN
Qty: 45 TABLET | Refills: 1 | Status: SHIPPED | OUTPATIENT
Start: 2023-11-06

## 2023-11-07 RX ORDER — AMLODIPINE BESYLATE 10 MG/1
10 TABLET ORAL DAILY
Qty: 90 TABLET | Refills: 3 | Status: SHIPPED | OUTPATIENT
Start: 2023-11-07

## 2023-11-21 ENCOUNTER — TELEPHONE (OUTPATIENT)
Dept: INTERNAL MEDICINE | Facility: CLINIC | Age: 73
End: 2023-11-21
Payer: MEDICARE

## 2023-12-06 DIAGNOSIS — M17.0 BILATERAL PRIMARY OSTEOARTHRITIS OF KNEE: ICD-10-CM

## 2023-12-06 RX ORDER — GABAPENTIN 100 MG/1
100 CAPSULE ORAL 2 TIMES DAILY PRN
Qty: 30 CAPSULE | Refills: 0 | Status: SHIPPED | OUTPATIENT
Start: 2023-12-06

## 2023-12-26 DIAGNOSIS — M17.0 BILATERAL PRIMARY OSTEOARTHRITIS OF KNEE: ICD-10-CM

## 2023-12-26 RX ORDER — GABAPENTIN 100 MG/1
100 CAPSULE ORAL 2 TIMES DAILY PRN
Qty: 30 CAPSULE | Refills: 0 | Status: SHIPPED | OUTPATIENT
Start: 2023-12-26

## 2024-01-27 DIAGNOSIS — M17.0 BILATERAL PRIMARY OSTEOARTHRITIS OF KNEE: ICD-10-CM

## 2024-01-29 RX ORDER — GABAPENTIN 100 MG/1
100 CAPSULE ORAL 2 TIMES DAILY PRN
Qty: 30 CAPSULE | Refills: 0 | Status: SHIPPED | OUTPATIENT
Start: 2024-01-29

## 2024-02-15 ENCOUNTER — LAB (OUTPATIENT)
Dept: LAB | Facility: HOSPITAL | Age: 74
End: 2024-02-15
Payer: MEDICARE

## 2024-02-15 DIAGNOSIS — I63.00 CEREBROVASCULAR ACCIDENT (CVA) DUE TO THROMBOSIS OF PRECEREBRAL ARTERY: ICD-10-CM

## 2024-02-15 DIAGNOSIS — Z95.0 PACEMAKER: ICD-10-CM

## 2024-02-15 DIAGNOSIS — Z00.00 MEDICARE ANNUAL WELLNESS VISIT, SUBSEQUENT: ICD-10-CM

## 2024-02-15 DIAGNOSIS — E78.2 MIXED HYPERLIPIDEMIA: ICD-10-CM

## 2024-02-15 DIAGNOSIS — I10 HYPERTENSION, ESSENTIAL: ICD-10-CM

## 2024-02-15 DIAGNOSIS — M79.661 RIGHT CALF PAIN: ICD-10-CM

## 2024-02-15 DIAGNOSIS — R73.01 IFG (IMPAIRED FASTING GLUCOSE): ICD-10-CM

## 2024-02-15 DIAGNOSIS — Z12.5 SCREENING PSA (PROSTATE SPECIFIC ANTIGEN): ICD-10-CM

## 2024-02-15 DIAGNOSIS — G60.9 PERIPHERAL NEUROPATHY, IDIOPATHIC: ICD-10-CM

## 2024-02-15 DIAGNOSIS — E55.9 VITAMIN D DEFICIENCY: ICD-10-CM

## 2024-02-15 LAB
ALBUMIN SERPL-MCNC: 5.1 G/DL (ref 3.5–5.2)
ALBUMIN/GLOB SERPL: 2.2 G/DL
ALP SERPL-CCNC: 51 U/L (ref 39–117)
ALT SERPL W P-5'-P-CCNC: 21 U/L (ref 1–41)
ANION GAP SERPL CALCULATED.3IONS-SCNC: 10 MMOL/L (ref 5–15)
AST SERPL-CCNC: 24 U/L (ref 1–40)
BASOPHILS # BLD AUTO: 0.04 10*3/MM3 (ref 0–0.2)
BASOPHILS NFR BLD AUTO: 0.5 % (ref 0–1.5)
BILIRUB SERPL-MCNC: 0.4 MG/DL (ref 0–1.2)
BUN SERPL-MCNC: 22 MG/DL (ref 8–23)
BUN/CREAT SERPL: 19 (ref 7–25)
CALCIUM SPEC-SCNC: 9.7 MG/DL (ref 8.6–10.5)
CHLORIDE SERPL-SCNC: 102 MMOL/L (ref 98–107)
CHOLEST SERPL-MCNC: 123 MG/DL (ref 0–200)
CO2 SERPL-SCNC: 27 MMOL/L (ref 22–29)
CREAT SERPL-MCNC: 1.16 MG/DL (ref 0.76–1.27)
DEPRECATED RDW RBC AUTO: 42.2 FL (ref 37–54)
EGFRCR SERPLBLD CKD-EPI 2021: 65.3 ML/MIN/1.73
EOSINOPHIL # BLD AUTO: 0.07 10*3/MM3 (ref 0–0.4)
EOSINOPHIL NFR BLD AUTO: 0.9 % (ref 0.3–6.2)
ERYTHROCYTE [DISTWIDTH] IN BLOOD BY AUTOMATED COUNT: 12.4 % (ref 12.3–15.4)
FOLATE SERPL-MCNC: 9.52 NG/ML (ref 4.78–24.2)
GLOBULIN UR ELPH-MCNC: 2.3 GM/DL
GLUCOSE SERPL-MCNC: 112 MG/DL (ref 65–99)
HBA1C MFR BLD: 6.4 % (ref 4.8–5.6)
HCT VFR BLD AUTO: 44 % (ref 37.5–51)
HDLC SERPL-MCNC: 56 MG/DL (ref 40–60)
HGB BLD-MCNC: 14.6 G/DL (ref 13–17.7)
IMM GRANULOCYTES # BLD AUTO: 0.02 10*3/MM3 (ref 0–0.05)
IMM GRANULOCYTES NFR BLD AUTO: 0.3 % (ref 0–0.5)
LDLC SERPL CALC-MCNC: 55 MG/DL (ref 0–100)
LDLC/HDLC SERPL: 1 {RATIO}
LYMPHOCYTES # BLD AUTO: 2.89 10*3/MM3 (ref 0.7–3.1)
LYMPHOCYTES NFR BLD AUTO: 37.1 % (ref 19.6–45.3)
MCH RBC QN AUTO: 30.9 PG (ref 26.6–33)
MCHC RBC AUTO-ENTMCNC: 33.2 G/DL (ref 31.5–35.7)
MCV RBC AUTO: 93.2 FL (ref 79–97)
MONOCYTES # BLD AUTO: 0.62 10*3/MM3 (ref 0.1–0.9)
MONOCYTES NFR BLD AUTO: 8 % (ref 5–12)
NEUTROPHILS NFR BLD AUTO: 4.14 10*3/MM3 (ref 1.7–7)
NEUTROPHILS NFR BLD AUTO: 53.2 % (ref 42.7–76)
NRBC BLD AUTO-RTO: 0 /100 WBC (ref 0–0.2)
PLATELET # BLD AUTO: 277 10*3/MM3 (ref 140–450)
PMV BLD AUTO: 10.3 FL (ref 6–12)
POTASSIUM SERPL-SCNC: 4.6 MMOL/L (ref 3.5–5.2)
PROT SERPL-MCNC: 7.4 G/DL (ref 6–8.5)
PSA SERPL-MCNC: 3.03 NG/ML (ref 0–4)
RBC # BLD AUTO: 4.72 10*6/MM3 (ref 4.14–5.8)
SODIUM SERPL-SCNC: 139 MMOL/L (ref 136–145)
TRIGL SERPL-MCNC: 56 MG/DL (ref 0–150)
VIT B12 BLD-MCNC: 579 PG/ML (ref 211–946)
VLDLC SERPL-MCNC: 12 MG/DL (ref 5–40)
WBC NRBC COR # BLD AUTO: 7.78 10*3/MM3 (ref 3.4–10.8)

## 2024-02-15 PROCEDURE — 85025 COMPLETE CBC W/AUTO DIFF WBC: CPT

## 2024-02-15 PROCEDURE — 80053 COMPREHEN METABOLIC PANEL: CPT

## 2024-02-15 PROCEDURE — 83036 HEMOGLOBIN GLYCOSYLATED A1C: CPT

## 2024-02-15 PROCEDURE — 36415 COLL VENOUS BLD VENIPUNCTURE: CPT

## 2024-02-15 PROCEDURE — 82746 ASSAY OF FOLIC ACID SERUM: CPT

## 2024-02-15 PROCEDURE — 80061 LIPID PANEL: CPT

## 2024-02-15 PROCEDURE — 82607 VITAMIN B-12: CPT

## 2024-02-15 PROCEDURE — G0103 PSA SCREENING: HCPCS

## 2024-02-20 ENCOUNTER — OFFICE VISIT (OUTPATIENT)
Dept: INTERNAL MEDICINE | Facility: CLINIC | Age: 74
End: 2024-02-20
Payer: MEDICARE

## 2024-02-20 VITALS
HEIGHT: 70 IN | TEMPERATURE: 98.2 F | DIASTOLIC BLOOD PRESSURE: 69 MMHG | OXYGEN SATURATION: 94 % | WEIGHT: 202.2 LBS | BODY MASS INDEX: 28.95 KG/M2 | SYSTOLIC BLOOD PRESSURE: 150 MMHG | HEART RATE: 105 BPM

## 2024-02-20 DIAGNOSIS — Z95.0 PACEMAKER: ICD-10-CM

## 2024-02-20 DIAGNOSIS — I63.00 CEREBROVASCULAR ACCIDENT (CVA) DUE TO THROMBOSIS OF PRECEREBRAL ARTERY: ICD-10-CM

## 2024-02-20 DIAGNOSIS — M54.32 SCIATICA OF LEFT SIDE: ICD-10-CM

## 2024-02-20 DIAGNOSIS — E55.9 VITAMIN D DEFICIENCY: ICD-10-CM

## 2024-02-20 DIAGNOSIS — E78.2 MIXED HYPERLIPIDEMIA: ICD-10-CM

## 2024-02-20 DIAGNOSIS — Z12.5 SCREENING PSA (PROSTATE SPECIFIC ANTIGEN): ICD-10-CM

## 2024-02-20 DIAGNOSIS — R73.01 IFG (IMPAIRED FASTING GLUCOSE): Primary | ICD-10-CM

## 2024-02-20 DIAGNOSIS — I10 HYPERTENSION, ESSENTIAL: ICD-10-CM

## 2024-02-20 DIAGNOSIS — I49.5 SSS (SICK SINUS SYNDROME): ICD-10-CM

## 2024-02-20 DIAGNOSIS — G60.9 PERIPHERAL NEUROPATHY, IDIOPATHIC: ICD-10-CM

## 2024-02-20 NOTE — PROGRESS NOTES
"CHIEF COMPLAINT/ HPI: Patient is here to follow-up with blood pressure, blood sugar issues recent lab studies, says he is doing well he is playing pickle ball on a regular basis, monitors his blood pressure some  Hyperlipidemia, Hypertension, and Follow-up              Objective   Vital Signs  Vitals:    02/20/24 1304   BP: 150/69   BP Location: Left arm   Patient Position: Sitting   Cuff Size: Adult   Pulse: 105   Temp: 98.2 °F (36.8 °C)   TempSrc: Temporal   SpO2: 94%   Weight: 91.7 kg (202 lb 3.2 oz)   Height: 177.8 cm (70\")      Body mass index is 29.01 kg/m².  Review of Systems   Eyes:  Negative for blurred vision.   Respiratory:  Negative for shortness of breath.    Cardiovascular:  Negative for chest pain and palpitations.      Physical Exam  Constitutional:       General: He is not in acute distress.     Appearance: Normal appearance.   HENT:      Head: Normocephalic.      Mouth/Throat:      Mouth: Mucous membranes are moist.   Eyes:      Conjunctiva/sclera: Conjunctivae normal.      Pupils: Pupils are equal, round, and reactive to light.   Cardiovascular:      Rate and Rhythm: Normal rate and regular rhythm.      Pulses: Normal pulses.      Heart sounds: Normal heart sounds.   Pulmonary:      Effort: Pulmonary effort is normal.      Breath sounds: Normal breath sounds.   Abdominal:      General: Abdomen is flat. Bowel sounds are normal.      Palpations: Abdomen is soft.   Musculoskeletal:         General: No swelling. Normal range of motion.      Cervical back: Neck supple.   Skin:     General: Skin is warm and dry.      Coloration: Skin is not jaundiced.   Neurological:      General: No focal deficit present.      Mental Status: He is alert and oriented to person, place, and time. Mental status is at baseline.   Psychiatric:         Mood and Affect: Mood normal.         Behavior: Behavior normal.         Thought Content: Thought content normal.         Judgment: Judgment normal.        Result Review :   No " "results found for: \"PROBNP\", \"BNP\"  CMP          8/3/2023    09:38 2/15/2024    10:48   CMP   Glucose 110  112    BUN 16  22    Creatinine 0.95  1.16    EGFR 83.5  65.3    Sodium 141  139    Potassium 4.5  4.6    Chloride 104  102    Calcium 9.8  9.7    Total Protein 7.1  7.4    Albumin 4.5  5.1    Globulin 2.6  2.3    Total Bilirubin 0.6  0.4    Alkaline Phosphatase 55  51    AST (SGOT) 25  24    ALT (SGPT) 27  21    Albumin/Globulin Ratio 1.7  2.2    BUN/Creatinine Ratio 16.8  19.0    Anion Gap 10.5  10.0      CBC w/diff          2/15/2024    10:48   CBC w/Diff   WBC 7.78    RBC 4.72    Hemoglobin 14.6    Hematocrit 44.0    MCV 93.2    MCH 30.9    MCHC 33.2    RDW 12.4    Platelets 277    Neutrophil Rel % 53.2    Immature Granulocyte Rel % 0.3    Lymphocyte Rel % 37.1    Monocyte Rel % 8.0    Eosinophil Rel % 0.9    Basophil Rel % 0.5       Lipid Panel          2/15/2024    10:48   Lipid Panel   Total Cholesterol 123    Triglycerides 56    HDL Cholesterol 56    VLDL Cholesterol 12    LDL Cholesterol  55    LDL/HDL Ratio 1.00       Lab Results   Component Value Date    TSH 2.110 01/06/2023    TSH 2.720 12/13/2021    TSH 2.410 11/03/2020      Lab Results   Component Value Date    FREET4 1.22 01/06/2023      A1C Last 3 Results          2/15/2024    10:48   HGBA1C Last 3 Results   Hemoglobin A1C 6.40       PSA          2/15/2024    10:48   PSA   PSA 3.030                     Visit Diagnoses:    ICD-10-CM ICD-9-CM   1. IFG  R73.01 790.21   2. Cerebrovascular accident (CVA) due to thrombosis of precerebral artery  I63.00 433.91   3. MELISSA NEUR IDOPA  G60.9 356.9   4. SSS (sick sinus syndrome)  I49.5 427.81   5. Pacemaker  Z95.0 V45.01   6. Mixed hyperlipidemia  E78.2 272.2   7. Vitamin D deficiency  E55.9 268.9   8. Screening PSA (prostate specific antigen)  Z12.5 V76.44   9. Hypertension, essential  I10 401.9   10. Sciatica of left side  M54.32 724.3       Assessment and Plan   Diagnoses and all orders for this " visit:    1. IFG (Primary)  -     Comprehensive Metabolic Panel; Future  -     CBC & Differential; Future  -     Hemoglobin A1c; Future  -     MicroAlbumin, Urine, Random - Urine, Clean Catch; Future    2. Cerebrovascular accident (CVA) due to thrombosis of precerebral artery  -     Comprehensive Metabolic Panel; Future  -     CBC & Differential; Future  -     Hemoglobin A1c; Future  -     MicroAlbumin, Urine, Random - Urine, Clean Catch; Future    3. MELISSA NEUR IDOPA  -     Comprehensive Metabolic Panel; Future  -     CBC & Differential; Future  -     Hemoglobin A1c; Future  -     MicroAlbumin, Urine, Random - Urine, Clean Catch; Future    4. SSS (sick sinus syndrome)  -     Comprehensive Metabolic Panel; Future  -     CBC & Differential; Future  -     Hemoglobin A1c; Future  -     MicroAlbumin, Urine, Random - Urine, Clean Catch; Future    5. Pacemaker  -     Comprehensive Metabolic Panel; Future  -     CBC & Differential; Future  -     Hemoglobin A1c; Future  -     MicroAlbumin, Urine, Random - Urine, Clean Catch; Future    6. Mixed hyperlipidemia  -     Comprehensive Metabolic Panel; Future  -     CBC & Differential; Future  -     Hemoglobin A1c; Future  -     MicroAlbumin, Urine, Random - Urine, Clean Catch; Future    7. Vitamin D deficiency  -     Comprehensive Metabolic Panel; Future  -     CBC & Differential; Future  -     Hemoglobin A1c; Future  -     MicroAlbumin, Urine, Random - Urine, Clean Catch; Future    8. Screening PSA (prostate specific antigen)  -     Comprehensive Metabolic Panel; Future  -     CBC & Differential; Future  -     Hemoglobin A1c; Future  -     MicroAlbumin, Urine, Random - Urine, Clean Catch; Future    9. Hypertension, essential  -     Comprehensive Metabolic Panel; Future  -     CBC & Differential; Future  -     Hemoglobin A1c; Future  -     MicroAlbumin, Urine, Random - Urine, Clean Catch; Future    10. Sciatica of left side  -     Ambulatory Referral to Physical Therapy Evaluate  and treat        Lumbar djd, back pain-,--prior back surgery- x 3 with fusion, --sciatica? R calf pain, better with neuronitn, 100 mg nightly, as of August 2023--- patient is developing worsening sciatica bilateral left greater than right now, February 2024--- steroid usage discussed briefly, risks of increasing stiff glucose, diabetes issues etc., consider physical therapy, also consider CT scan of his lumbar spine and referral to neurosurgery again?,    Cad, mi -2011, cath     Impaired fasting glucose, = hemoglobin A1c 6.4 February 15, 2024---    Vitamin B12 deficiency, recommend vitamin B12 1000 mcg daily indefinitely    Sss, pacemaker age 36--dr rico reg.     Ed , cont viagra     Stroke --with left arm weakness, 2020, - continues eliquis 5 mg twice a day,   dr rico --    Hypertension---  continues benazepril HCTZ 20-12.5 mg daily, Norvasc 10 mg nightly,,-------- nuclear medicine stress test, May 2023 ==with myocardial perfusion showed normal ejection fraction normal mild cardial perfusion study, low risk study, normal EKG stress test    PSA value equals 3.0 February 15, 2024 no changes over the past several years    Hyperlipidemia continues  rosuvastatin 20 mg daily    Right calf pain at night, possible sciatica from his back issues, saw Dr. Ellington, gabapentin ordered seems to be helping at bedtime, August 2023    Knee oa ---dr ellington, cortisone inject ---, improved, December 2022, venous evaluation right lower leg negative for DVT December 2022    PUD , C SCOPE AND  egd 8/2021, dr earl      Follow Up   Return in about 6 months (around 8/20/2024).  Patient was given instructions and counseling regarding his condition or for health maintenance advice. Please see specific information pulled into the AVS if appropriate.         Answers submitted by the patient for this visit:  Primary Reason for Visit (Submitted on 2/14/2024)  What is the primary reason for your visit?: High Blood Pressure  High Blood  Pressure Questionnaire (Submitted on 2/14/2024)  Chief Complaint: Hypertension  Onset: more than 1 month ago  Progression since onset: improved  anxiety: No  headaches: No  malaise/fatigue: No  orthopnea: No  peripheral edema: No  Agents associated with hypertension: NSAIDs  Compliance problems: no compliance problems

## 2024-02-25 DIAGNOSIS — M17.0 BILATERAL PRIMARY OSTEOARTHRITIS OF KNEE: ICD-10-CM

## 2024-02-26 RX ORDER — GABAPENTIN 100 MG/1
100 CAPSULE ORAL 2 TIMES DAILY PRN
Qty: 30 CAPSULE | Refills: 0 | Status: SHIPPED | OUTPATIENT
Start: 2024-02-26

## 2024-03-28 DIAGNOSIS — M17.0 BILATERAL PRIMARY OSTEOARTHRITIS OF KNEE: ICD-10-CM

## 2024-04-01 RX ORDER — GABAPENTIN 100 MG/1
100 CAPSULE ORAL 2 TIMES DAILY PRN
Qty: 30 CAPSULE | Refills: 0 | Status: SHIPPED | OUTPATIENT
Start: 2024-04-01

## 2024-04-17 ENCOUNTER — OFFICE VISIT (OUTPATIENT)
Dept: ORTHOPEDIC SURGERY | Facility: CLINIC | Age: 74
End: 2024-04-17
Payer: MEDICARE

## 2024-04-17 VITALS
WEIGHT: 202 LBS | HEART RATE: 74 BPM | HEIGHT: 70 IN | DIASTOLIC BLOOD PRESSURE: 79 MMHG | BODY MASS INDEX: 28.92 KG/M2 | OXYGEN SATURATION: 94 % | SYSTOLIC BLOOD PRESSURE: 161 MMHG

## 2024-04-17 DIAGNOSIS — M54.16 LUMBAR RADICULOPATHY: ICD-10-CM

## 2024-04-17 DIAGNOSIS — M65.341 TRIGGER RING FINGER OF RIGHT HAND: Primary | ICD-10-CM

## 2024-04-17 RX ORDER — LIDOCAINE HYDROCHLORIDE 10 MG/ML
1 INJECTION, SOLUTION INFILTRATION; PERINEURAL
Status: COMPLETED | OUTPATIENT
Start: 2024-04-17 | End: 2024-04-17

## 2024-04-17 RX ORDER — TRIAMCINOLONE ACETONIDE 40 MG/ML
40 INJECTION, SUSPENSION INTRA-ARTICULAR; INTRAMUSCULAR
Status: COMPLETED | OUTPATIENT
Start: 2024-04-17 | End: 2024-04-17

## 2024-04-17 RX ADMIN — LIDOCAINE HYDROCHLORIDE 1 ML: 10 INJECTION, SOLUTION INFILTRATION; PERINEURAL at 14:45

## 2024-04-17 RX ADMIN — TRIAMCINOLONE ACETONIDE 40 MG: 40 INJECTION, SUSPENSION INTRA-ARTICULAR; INTRAMUSCULAR at 14:45

## 2024-04-17 NOTE — PROGRESS NOTES
"Chief Complaint  Pain and Initial Evaluation of the Right Hand    Subjective          Alexandre Alvarado presents to [facility] for   History of Present Illness    The patient presents here today for evaluation of the right hand. He reports a trigger finger to his right 4th finger. He has previously had an injection in the past. He reports it locks at times.     No Known Allergies     Social History     Socioeconomic History    Marital status:    Tobacco Use    Smoking status: Never    Smokeless tobacco: Never   Vaping Use    Vaping status: Never Used   Substance and Sexual Activity    Alcohol use: Yes     Alcohol/week: 1.0 standard drink of alcohol     Types: 1 Glasses of wine per week     Comment: Current some day    Drug use: Never    Sexual activity: Yes     Partners: Female     Birth control/protection: Vasectomy, Surgical        I reviewed the patient's chief complaint, history of present illness, review of systems, past medical history, surgical history, family history, social history, medications, and allergy list.     REVIEW OF SYSTEMS    Constitutional: Denies fevers, chills, weight loss  Cardiovascular: Denies chest pain, shortness of breath  Skin: Denies rashes, acute skin changes  Neurologic: Denies headache, loss of consciousness  MSK: Right hand pain      Objective   Vital Signs:   /79   Pulse 74   Ht 177.8 cm (70\")   Wt 91.6 kg (202 lb)   SpO2 94%   BMI 28.98 kg/m²     Body mass index is 28.98 kg/m².    Physical Exam    General: Alert. No acute distress.   Right hand- triggering and locking of the 4th finger. Neurovascularly intact. Sensation to light touch median, radial, ulnar nerve. Positive AIN, PIN, ulnar nerve motor function. Positive pulses. Tender to the A-1 pulley.     Small Joint Arthrocentesis  Consent given by: patient  Site marked: site marked  Timeout: Immediately prior to procedure a time out was called to verify the correct patient, " procedure, equipment, support staff and site/side marked as required   Supporting Documentation  Indications: pain   Procedure Details  Location: ring finger (RIGHT) -   Preparation: Patient was prepped and draped in the usual sterile fashion  Needle gauge: 23 G.  Medications administered: 1 mL lidocaine 1 %; 40 mg triamcinolone acetonide 40 MG/ML  Patient tolerance: patient tolerated the procedure well with no immediate complications      This injection documentation was Scribed for Wolf Ellington MD by Monisha Rodas.  04/17/24   15:08 EDT    Imaging Results (Most Recent)       None                     Assessment and Plan        No results found.     Diagnoses and all orders for this visit:    1. Trigger ring finger of right hand (Primary)    2. Lumbar radiculopathy  -     Ambulatory Referral to Neurosurgery    Other orders  -     Small Joint Arthrocentesis        Discussed the treatment plan with the patient.  Discussed the risks and benefits of a right 4th finger trigger finger injection. The patient expressed understanding and wished to proceed. He tolerated the injection well         Call or return if worsening symptoms.    Scribed for Wolf Ellington MD by Sierra Camacho  04/17/2024   14:48 EDT         Follow Up       PRN    Patient was given instructions and counseling regarding his condition or for health maintenance advice. Please see specific information pulled into the AVS if appropriate.       I have personally performed the services described in this document as scribed by the above individual and it is both accurate and complete.     Wolf Ellington MD  04/17/24  15:45 EDT

## 2024-04-23 DIAGNOSIS — M17.0 BILATERAL PRIMARY OSTEOARTHRITIS OF KNEE: ICD-10-CM

## 2024-04-23 RX ORDER — GABAPENTIN 100 MG/1
100 CAPSULE ORAL 2 TIMES DAILY PRN
Qty: 30 CAPSULE | Refills: 0 | Status: SHIPPED | OUTPATIENT
Start: 2024-04-23

## 2024-05-03 RX ORDER — BENAZEPRIL HYDROCHLORIDE AND HYDROCHLOROTHIAZIDE 20; 12.5 MG/1; MG/1
1 TABLET ORAL DAILY
Qty: 90 TABLET | Refills: 1 | Status: SHIPPED | OUTPATIENT
Start: 2024-05-03

## 2024-05-14 ENCOUNTER — OFFICE VISIT (OUTPATIENT)
Dept: NEUROSURGERY | Facility: CLINIC | Age: 74
End: 2024-05-14
Payer: MEDICARE

## 2024-05-14 VITALS
HEIGHT: 70 IN | BODY MASS INDEX: 28.35 KG/M2 | HEART RATE: 63 BPM | WEIGHT: 198 LBS | SYSTOLIC BLOOD PRESSURE: 145 MMHG | DIASTOLIC BLOOD PRESSURE: 79 MMHG

## 2024-05-14 DIAGNOSIS — M54.42 CHRONIC MIDLINE LOW BACK PAIN WITH LEFT-SIDED SCIATICA: ICD-10-CM

## 2024-05-14 DIAGNOSIS — Z98.1 HISTORY OF LUMBAR FUSION: ICD-10-CM

## 2024-05-14 DIAGNOSIS — M47.816 LUMBAR FACET ARTHROPATHY: ICD-10-CM

## 2024-05-14 DIAGNOSIS — G89.29 CHRONIC MIDLINE LOW BACK PAIN WITH LEFT-SIDED SCIATICA: ICD-10-CM

## 2024-05-14 DIAGNOSIS — M51.36 DDD (DEGENERATIVE DISC DISEASE), LUMBAR: Primary | ICD-10-CM

## 2024-05-14 PROCEDURE — 1159F MED LIST DOCD IN RCRD: CPT | Performed by: PHYSICIAN ASSISTANT

## 2024-05-14 PROCEDURE — 3077F SYST BP >= 140 MM HG: CPT | Performed by: PHYSICIAN ASSISTANT

## 2024-05-14 PROCEDURE — 1160F RVW MEDS BY RX/DR IN RCRD: CPT | Performed by: PHYSICIAN ASSISTANT

## 2024-05-14 PROCEDURE — 3078F DIAST BP <80 MM HG: CPT | Performed by: PHYSICIAN ASSISTANT

## 2024-05-14 PROCEDURE — 99204 OFFICE O/P NEW MOD 45 MIN: CPT | Performed by: PHYSICIAN ASSISTANT

## 2024-05-14 NOTE — PROGRESS NOTES
"Chief Complaint  Back Pain, Leg Pain (Left hip to calf ), and Numbness (Right heel and toes )    Subjective          Alexandre Alvarado who is a 74 y.o. year old male who presents to [facility] for Evaluation of the Spine.     The patient complains of pain located in the Lumbar Spine.  Patients states the pain has been present for 50 years.  The pain came on gradually.  The pain scaled level is 2.  The pain does radiate. Dermatomes are located on left Lumbar at: below the knee and to the calf, almost to the ankle.  The pain is constant and waxing/waning and described as sharp.  The pain is worse at no particular time of day. Patient states  walking or leaning to the right side makes the pain better.  Patient states Prolonged Sitting makes the pain worse.    Associated Symptoms Include:  Denies.  Conservative Interventions Include: Physical Therapy that was somewhat effective. Muscle relaxer and Gabapentin is ineffective.    Was this the result of an injury or accident? : No    History of Previous Spinal Surgery?: Yes.  Lumbar, Date 2000 fusion L4-S1, previous surgeries in 1992 and 1998.     reports that he has never smoked. He has never used smokeless tobacco.    Review of Systems   Musculoskeletal:  Positive for back pain.        Objective   Vital Signs:   /79   Pulse 63   Ht 177.8 cm (70\")   Wt 89.8 kg (198 lb)   BMI 28.41 kg/m²       Physical Exam  Constitutional:       Appearance: Normal appearance.   Pulmonary:      Effort: Pulmonary effort is normal.   Musculoskeletal:         General: No tenderness.      Comments: SLR negative bilaterally   Neurological:      General: No focal deficit present.      Mental Status: He is alert and oriented to person, place, and time.      Sensory: No sensory deficit.      Motor: No weakness.      Deep Tendon Reflexes: Reflexes normal.   Psychiatric:         Mood and Affect: Mood normal.         Behavior: Behavior normal.      "   Neurologic Exam     Mental Status   Oriented to person, place, and time.        Result Review     I have personally reviewed the radiology report for x-ray of the lumbar spine from 8/27/2019 which shows posterior fusion L4-S1 with multilevel degenerative disc disease and facet arthritis.     Assessment and Plan    Diagnoses and all orders for this visit:    1. DDD (degenerative disc disease), lumbar (Primary)  -     CT Lumbar Spine Without Contrast; Future    2. Lumbar facet arthropathy  -     CT Lumbar Spine Without Contrast; Future    3. Chronic midline low back pain with left-sided sciatica  -     CT Lumbar Spine Without Contrast; Future    4. History of lumbar fusion  -     CT Lumbar Spine Without Contrast; Future    He has pain in the back and left leg to the calf not quite to ankle.    He did not see any major improvement with physical therapy.    He has a pacemaker with lead that is not MRI compatible. I will order a CT of the lumbar spine without contrast to evaluate the lumbar spine pathology for possible stenosis which may contribute to lumbar radiculopathy.    He will follow-up here in 4 weeks to reassess.    Follow Up   Return in about 4 weeks (around 6/11/2024).  Patient was given instructions and counseling regarding his condition or for health maintenance advice. Please see specific information pulled into the AVS if appropriate.

## 2024-05-15 ENCOUNTER — PATIENT ROUNDING (BHMG ONLY) (OUTPATIENT)
Dept: NEUROSURGERY | Facility: CLINIC | Age: 74
End: 2024-05-15
Payer: MEDICARE

## 2024-05-19 NOTE — PROGRESS NOTES
[facility]  Cardiology progress Note    Patient Name: Alexandre Alvarado  : 1950    CHIEF COMPLAINT  Atrial fibrillation        Subjective   Subjective     HISTORY OF PRESENT ILLNESS    Alexandre Alvarado is a 74 y.o. male with history of atrial fibrillation.  No palpitations.  Shortness of breath stable.    REVIEW OF SYSTEMS    Constitutional:    No fever, no weight loss  Skin:     No rash  Otolaryngeal:    No difficulty swallowing  Cardiovascular: See HPI.  Pulmonary:    No cough, no sputum production    Personal History     Social History:    reports that he has never smoked. He has never used smokeless tobacco. He reports current alcohol use of about 1.0 standard drink of alcohol per week. He reports that he does not use drugs.    Home Medications:  Current Outpatient Medications on File Prior to Visit   Medication Sig    amLODIPine (NORVASC) 10 MG tablet Take 1 tablet by mouth Daily.    apixaban (Eliquis) 5 MG tablet tablet Take 1 tablet by mouth 2 (Two) Times a Day.    benazepril-hydrochlorthiazide (LOTENSIN HCT) 20-12.5 MG per tablet TAKE 1 TABLET BY MOUTH DAILY    cyclobenzaprine (FLEXERIL) 5 MG tablet Take 1 tablet by mouth 2 (Two) Times a Day As Needed for Muscle Spasms.    gabapentin (NEURONTIN) 100 MG capsule Take 1 capsule by mouth 2 (Two) Times a Day As Needed (Pain).    rosuvastatin (CRESTOR) 20 MG tablet Take 1 tablet by mouth Daily.    sildenafil (VIAGRA) 100 MG tablet Take 1 tablet by mouth As Needed for Erectile Dysfunction.     No current facility-administered medications on file prior to visit.       Past Medical History:   Diagnosis Date    Abdominal aortic aneurysm (AAA)     Advance directive discussed with patient 2020    Arthritis     Bursitis of hip 2019    Cataract     Corrected    Depression     Diverticulosis     Erectile dysfunction 2019    Fracture, finger 1967    Great toe pain, left 2020    History of colonoscopy 2020    Hyperlipidemia      Hypertension, essential     Knee sprain 2022    Both knees left May. Right July    Low back pain 1992    Lumbosacral disc disease 1992    Operations  1992   1998 and 2000 when fused    Myocardial infarction 2011    Pacemaker 08/11/2020    Paroxysmal atrial fibrillation 12/13/2021    Prostatic hypertrophy     Benign    Rectal bleeding     Seasonal allergies     SSS (sick sinus syndrome)     Stomach ulcer     Stroke 2019    Tendinitis of knee 2022    Tennis elbow 1988    Various times    TIA (transient ischemic attack)        Allergies:  No Known Allergies    Objective    Objective       Vitals:   Heart Rate:  [97] 97  BP: (118)/(62) 118/62  Body mass index is 28.38 kg/m².     PHYSICAL EXAM:    General Appearance:   well developed  well nourished  HENT:   oropharynx moist  lips not cyanotic  Neck:  thyroid not enlarged  supple  Respiratory:  no respiratory distress  normal breath sounds  no rales  Cardiovascular:  no jugular venous distention  regular rhythm  apical impulse normal  S1 normal, S2 normal  no S3, no S4   no murmur  no rub, no thrill  carotid pulses normal; no bruit  pedal pulses normal  lower extremity edema: none    Skin:   warm, dry  Psychiatric:  judgement and insight appropriate  normal mood and affect        Result Review:  I have personally reviewed the available results from  [x]  Laboratory  [x]  EKG  [x]  Cardiology  [x]  Medications  [x]  Old records  []  Other:     Procedures  Lab Results   Component Value Date    CHOL 123 02/15/2024    CHOL 138 01/06/2023    CHOL 122 12/13/2021     Lab Results   Component Value Date    TRIG 56 02/15/2024    TRIG 60 01/06/2023    TRIG 84 12/13/2021     Lab Results   Component Value Date    HDL 56 02/15/2024    HDL 60 01/06/2023    HDL 53 12/13/2021     Lab Results   Component Value Date    LDL 55 02/15/2024    LDL 65 01/06/2023    LDL 53 12/13/2021     Lab Results   Component Value Date    VLDL 12 02/15/2024    VLDL 13 01/06/2023    VLDL 16 12/13/2021      Results for orders placed during the hospital encounter of 05/09/23    Adult Transthoracic Echo Complete W/ Cont if Necessary Per Protocol    Interpretation Summary  Left ventricular hypertrophy with normal left ventricular systolic function.  No significant valve abnormalities noted.     Impression/Plan:  1.  Sick sinus syndrome status post permanent pacemaker: Pacemaker check shows pacemaker is functioning normally.  2.  Paroxysmal atrial fibrillation: Continue Eliquis 5 mg twice a day for stroke prevention.  Able to tolerate Eliquis without any side effects.    3.  Mixed hyperlipidemia: Continue Crestor 20 mg once a day.  Monitor lipid and hepatic profile.  4.  Essential hypertension controlled: Continue Lotensin/hydrochlorothiazide 20/12.5 mg once a day.  Continue amlodipine to 10 mg once a day.  Monitor blood pressure regularly at home.  5.  Shortness of breath: Echocardiogram normal left ventricular systolic function.  Stress test negative.              Kosta Dahl MD   05/21/24   14:31 EDT

## 2024-05-20 DIAGNOSIS — M17.0 BILATERAL PRIMARY OSTEOARTHRITIS OF KNEE: ICD-10-CM

## 2024-05-20 RX ORDER — GABAPENTIN 100 MG/1
100 CAPSULE ORAL 2 TIMES DAILY PRN
Qty: 30 CAPSULE | Refills: 0 | Status: SHIPPED | OUTPATIENT
Start: 2024-05-20

## 2024-05-21 ENCOUNTER — OFFICE VISIT (OUTPATIENT)
Dept: CARDIOLOGY | Facility: CLINIC | Age: 74
End: 2024-05-21
Payer: MEDICARE

## 2024-05-21 VITALS
DIASTOLIC BLOOD PRESSURE: 62 MMHG | HEART RATE: 97 BPM | HEIGHT: 70 IN | WEIGHT: 197.8 LBS | BODY MASS INDEX: 28.32 KG/M2 | SYSTOLIC BLOOD PRESSURE: 118 MMHG

## 2024-05-21 DIAGNOSIS — I10 HYPERTENSION, ESSENTIAL: ICD-10-CM

## 2024-05-21 DIAGNOSIS — I48.0 PAROXYSMAL ATRIAL FIBRILLATION: Primary | ICD-10-CM

## 2024-05-21 DIAGNOSIS — Z95.0 PRESENCE OF PERMANENT CARDIAC PACEMAKER: ICD-10-CM

## 2024-05-21 DIAGNOSIS — R06.02 SHORTNESS OF BREATH: ICD-10-CM

## 2024-05-21 DIAGNOSIS — E78.2 HYPERLIPEMIA, MIXED: ICD-10-CM

## 2024-05-21 PROCEDURE — 99214 OFFICE O/P EST MOD 30 MIN: CPT | Performed by: SPECIALIST

## 2024-05-21 PROCEDURE — 1160F RVW MEDS BY RX/DR IN RCRD: CPT | Performed by: SPECIALIST

## 2024-05-21 PROCEDURE — 1159F MED LIST DOCD IN RCRD: CPT | Performed by: SPECIALIST

## 2024-05-21 PROCEDURE — 3078F DIAST BP <80 MM HG: CPT | Performed by: SPECIALIST

## 2024-05-21 PROCEDURE — 3074F SYST BP LT 130 MM HG: CPT | Performed by: SPECIALIST

## 2024-05-23 ENCOUNTER — HOSPITAL ENCOUNTER (OUTPATIENT)
Dept: CT IMAGING | Facility: HOSPITAL | Age: 74
Discharge: HOME | End: 2024-05-23
Admitting: PHYSICIAN ASSISTANT
Payer: MEDICARE

## 2024-05-23 DIAGNOSIS — M47.816 LUMBAR FACET ARTHROPATHY: ICD-10-CM

## 2024-05-23 DIAGNOSIS — M54.42 CHRONIC MIDLINE LOW BACK PAIN WITH LEFT-SIDED SCIATICA: ICD-10-CM

## 2024-05-23 DIAGNOSIS — G89.29 CHRONIC MIDLINE LOW BACK PAIN WITH LEFT-SIDED SCIATICA: ICD-10-CM

## 2024-05-23 DIAGNOSIS — M51.36 DDD (DEGENERATIVE DISC DISEASE), LUMBAR: ICD-10-CM

## 2024-05-23 DIAGNOSIS — Z98.1 HISTORY OF LUMBAR FUSION: ICD-10-CM

## 2024-05-23 PROCEDURE — 72131 CT LUMBAR SPINE W/O DYE: CPT

## 2024-05-28 RX ORDER — SILDENAFIL 100 MG/1
100 TABLET, FILM COATED ORAL AS NEEDED
Qty: 30 TABLET | Refills: 1 | Status: SHIPPED | OUTPATIENT
Start: 2024-05-28

## 2024-06-04 ENCOUNTER — HOSPITAL ENCOUNTER (EMERGENCY)
Facility: HOSPITAL | Age: 74
Discharge: HOME | End: 2024-06-05
Attending: EMERGENCY MEDICINE | Admitting: EMERGENCY MEDICINE
Payer: COMMERCIAL

## 2024-06-04 ENCOUNTER — APPOINTMENT (OUTPATIENT)
Dept: CT IMAGING | Facility: HOSPITAL | Age: 74
End: 2024-06-04
Payer: COMMERCIAL

## 2024-06-04 ENCOUNTER — APPOINTMENT (OUTPATIENT)
Dept: GENERAL RADIOLOGY | Facility: HOSPITAL | Age: 74
End: 2024-06-04
Payer: COMMERCIAL

## 2024-06-04 VITALS
HEIGHT: 71 IN | OXYGEN SATURATION: 98 % | SYSTOLIC BLOOD PRESSURE: 153 MMHG | WEIGHT: 200.18 LBS | DIASTOLIC BLOOD PRESSURE: 87 MMHG | TEMPERATURE: 98.4 F | HEART RATE: 74 BPM | RESPIRATION RATE: 18 BRPM | BODY MASS INDEX: 28.02 KG/M2
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
## 2024-06-04 DIAGNOSIS — S09.90XA MINOR HEAD INJURY, INITIAL ENCOUNTER: ICD-10-CM

## 2024-06-04 DIAGNOSIS — S39.012A STRAIN OF LUMBAR REGION, INITIAL ENCOUNTER: ICD-10-CM

## 2024-06-04 PROCEDURE — 96372 THER/PROPH/DIAG INJ SC/IM: CPT

## 2024-06-04 PROCEDURE — 72100 X-RAY EXAM L-S SPINE 2/3 VWS: CPT

## 2024-06-04 PROCEDURE — 99284 EMERGENCY DEPT VISIT MOD MDM: CPT

## 2024-06-04 PROCEDURE — 25010000002 ORPHENADRINE CITRATE PER 60 MG: Performed by: EMERGENCY MEDICINE

## 2024-06-04 PROCEDURE — 70450 CT HEAD/BRAIN W/O DYE: CPT

## 2024-06-04 PROCEDURE — 72125 CT NECK SPINE W/O DYE: CPT

## 2024-06-04 RX ORDER — ACETAMINOPHEN 500 MG
1000 TABLET ORAL ONCE
Status: DISCONTINUED | OUTPATIENT
Start: 2024-06-04 | End: 2024-06-05

## 2024-06-04 RX ORDER — ACETAMINOPHEN 500 MG
1000 TABLET ORAL ONCE
Status: COMPLETED | OUTPATIENT
Start: 2024-06-04 | End: 2024-06-04

## 2024-06-04 RX ORDER — ORPHENADRINE CITRATE 30 MG/ML
60 INJECTION INTRAMUSCULAR; INTRAVENOUS ONCE
Status: COMPLETED | OUTPATIENT
Start: 2024-06-04 | End: 2024-06-04

## 2024-06-04 RX ORDER — ORPHENADRINE CITRATE 30 MG/ML
60 INJECTION INTRAMUSCULAR; INTRAVENOUS ONCE
Status: DISCONTINUED | OUTPATIENT
Start: 2024-06-04 | End: 2024-06-05 | Stop reason: SDUPTHER

## 2024-06-04 RX ADMIN — ACETAMINOPHEN 1000 MG: 500 TABLET ORAL at 23:27

## 2024-06-04 RX ADMIN — ORPHENADRINE CITRATE 60 MG: 30 INJECTION, SOLUTION INTRAMUSCULAR; INTRAVENOUS at 23:27
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
## 2024-06-05 ENCOUNTER — APPOINTMENT (OUTPATIENT)
Dept: CT IMAGING | Facility: HOSPITAL | Age: 74
End: 2024-06-05
Payer: COMMERCIAL

## 2024-06-05 PROCEDURE — 70450 CT HEAD/BRAIN W/O DYE: CPT

## 2024-06-05 RX ORDER — HYDROCODONE BITARTRATE AND ACETAMINOPHEN 5; 325 MG/1; MG/1
1 TABLET ORAL EVERY 6 HOURS PRN
Qty: 12 TABLET | Refills: 0 | Status: SHIPPED | OUTPATIENT
Start: 2024-06-05 | End: 2024-06-13

## 2024-06-05 RX ORDER — METHOCARBAMOL 750 MG/1
1500 TABLET, FILM COATED ORAL 3 TIMES DAILY PRN
Qty: 30 TABLET | Refills: 0 | Status: SHIPPED | OUTPATIENT
Start: 2024-06-05 | End: 2024-06-13

## 2024-06-13 ENCOUNTER — OFFICE VISIT (OUTPATIENT)
Dept: INTERNAL MEDICINE | Age: 74
End: 2024-06-13
Payer: COMMERCIAL

## 2024-06-13 VITALS
SYSTOLIC BLOOD PRESSURE: 139 MMHG | HEART RATE: 69 BPM | DIASTOLIC BLOOD PRESSURE: 81 MMHG | HEIGHT: 71 IN | BODY MASS INDEX: 27.58 KG/M2 | TEMPERATURE: 97.7 F | OXYGEN SATURATION: 97 % | WEIGHT: 197 LBS

## 2024-06-13 DIAGNOSIS — M54.50 ACUTE BILATERAL LOW BACK PAIN WITHOUT SCIATICA: ICD-10-CM

## 2024-06-13 DIAGNOSIS — R51.9 FREQUENT HEADACHES: Primary | ICD-10-CM

## 2024-06-13 PROCEDURE — 99213 OFFICE O/P EST LOW 20 MIN: CPT | Performed by: INTERNAL MEDICINE

## 2024-06-13 RX ORDER — AMITRIPTYLINE HYDROCHLORIDE 10 MG/1
TABLET, FILM COATED ORAL
Qty: 30 TABLET | Refills: 1 | Status: SHIPPED | OUTPATIENT
Start: 2024-06-13

## 2024-06-13 RX ORDER — METHYLPREDNISOLONE 4 MG/1
TABLET ORAL
Qty: 21 EACH | Refills: 0 | Status: SHIPPED | OUTPATIENT
Start: 2024-06-13 | End: 2024-06-18
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
## 2024-06-20 ENCOUNTER — OFFICE VISIT (OUTPATIENT)
Dept: NEUROSURGERY | Facility: CLINIC | Age: 74
End: 2024-06-20
Payer: COMMERCIAL

## 2024-06-20 VITALS
BODY MASS INDEX: 27.72 KG/M2 | SYSTOLIC BLOOD PRESSURE: 153 MMHG | WEIGHT: 198 LBS | HEIGHT: 71 IN | DIASTOLIC BLOOD PRESSURE: 85 MMHG | HEART RATE: 88 BPM

## 2024-06-20 DIAGNOSIS — M54.42 CHRONIC MIDLINE LOW BACK PAIN WITH LEFT-SIDED SCIATICA: Primary | ICD-10-CM

## 2024-06-20 DIAGNOSIS — G89.29 CHRONIC MIDLINE LOW BACK PAIN WITH LEFT-SIDED SCIATICA: Primary | ICD-10-CM
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
## 2024-06-25 DIAGNOSIS — M17.0 BILATERAL PRIMARY OSTEOARTHRITIS OF KNEE: ICD-10-CM

## 2024-06-25 RX ORDER — GABAPENTIN 100 MG/1
100 CAPSULE ORAL 2 TIMES DAILY PRN
Qty: 30 CAPSULE | Refills: 0 | Status: SHIPPED | OUTPATIENT
Start: 2024-06-25

## 2024-07-12 ENCOUNTER — TELEPHONE (OUTPATIENT)
Dept: NEUROSURGERY | Facility: CLINIC | Age: 74
End: 2024-07-12
Payer: COMMERCIAL

## 2024-07-12 DIAGNOSIS — R51.9 FREQUENT HEADACHES: ICD-10-CM

## 2024-07-12 DIAGNOSIS — I63.00 CEREBROVASCULAR ACCIDENT (CVA) DUE TO THROMBOSIS OF PRECEREBRAL ARTERY: Primary | ICD-10-CM

## 2024-07-12 NOTE — TELEPHONE ENCOUNTER
Spoke with patient, per Dr. Che's last office note, he did state we would order repeat CT if worsening. I have placed that order, patient is aware Dr. Che is out of the office until 7-25-24. We will go ahead and work on getting the CT done, in the meantime. If he has any worsening, he should call the office or report to ER.

## 2024-07-12 NOTE — TELEPHONE ENCOUNTER
Caller: Alexandre Alvarado     Relationship: [unfilled]     Best call back number: [phone]    What is your medical concern? HEADACHES    How long has this issue been going on? HAS NOT CHANGED SINCE LAST VISIT BUT PT IS HAVING SOME NAUSEA    Is your provider already aware of this issue? YES    Have you been treated for this issue? NO--PT IS SCHEDULED WITH PCP  7-24-24 TO DISCUSS SAME ISSUE    PLEASE CALL TO DISCUSS/ADVISE  PER CLAUDIA RAFITA STATES MAY HAVE PT REPEAT CT SCAN.     THANK YOU,

## 2024-07-16 DIAGNOSIS — M17.0 BILATERAL PRIMARY OSTEOARTHRITIS OF KNEE: ICD-10-CM

## 2024-07-16 RX ORDER — GABAPENTIN 100 MG/1
100 CAPSULE ORAL 2 TIMES DAILY PRN
Qty: 30 CAPSULE | Refills: 0 | Status: SHIPPED | OUTPATIENT
Start: 2024-07-16

## 2024-07-24 ENCOUNTER — OFFICE VISIT (OUTPATIENT)
Dept: INTERNAL MEDICINE | Age: 74
End: 2024-07-24
Payer: MEDICARE

## 2024-07-24 VITALS
WEIGHT: 197 LBS | OXYGEN SATURATION: 94 % | HEIGHT: 71 IN | DIASTOLIC BLOOD PRESSURE: 82 MMHG | TEMPERATURE: 98.2 F | BODY MASS INDEX: 27.58 KG/M2 | SYSTOLIC BLOOD PRESSURE: 163 MMHG | HEART RATE: 96 BPM

## 2024-07-24 DIAGNOSIS — I10 HYPERTENSION, ESSENTIAL: ICD-10-CM
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
## 2024-07-24 DIAGNOSIS — Z95.0 PACEMAKER: ICD-10-CM

## 2024-07-24 DIAGNOSIS — R51.9 FREQUENT HEADACHES: Primary | ICD-10-CM
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
## 2024-07-24 PROCEDURE — 99214 OFFICE O/P EST MOD 30 MIN: CPT | Performed by: INTERNAL MEDICINE

## 2024-07-24 PROCEDURE — 3077F SYST BP >= 140 MM HG: CPT | Performed by: INTERNAL MEDICINE

## 2024-07-24 PROCEDURE — 3079F DIAST BP 80-89 MM HG: CPT | Performed by: INTERNAL MEDICINE

## 2024-07-24 RX ORDER — SERTRALINE HYDROCHLORIDE 25 MG/1
25 TABLET, FILM COATED ORAL DAILY
Qty: 30 TABLET | Refills: 3 | Status: SHIPPED | OUTPATIENT
Start: 2024-07-24

## 2024-07-24 RX ORDER — CLONAZEPAM 0.5 MG/1
0.5 TABLET ORAL 2 TIMES DAILY PRN
Qty: 30 TABLET | Refills: 0 | Status: SHIPPED | OUTPATIENT
Start: 2024-07-24

## 2024-07-24 RX ORDER — TOPIRAMATE 25 MG/1
25 TABLET ORAL NIGHTLY
Qty: 15 TABLET | Refills: 2 | Status: SHIPPED | OUTPATIENT
Start: 2024-07-24
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
## 2024-07-29 ENCOUNTER — HOSPITAL ENCOUNTER (OUTPATIENT)
Dept: CT IMAGING | Facility: HOSPITAL | Age: 74
Discharge: HOME | End: 2024-07-29
Admitting: NEUROLOGICAL SURGERY
Payer: MEDICARE

## 2024-07-29 ENCOUNTER — TRANSCRIBE ORDERS (OUTPATIENT)
Dept: VASCULAR SURGERY | Facility: HOSPITAL | Age: 74
End: 2024-07-29
Payer: COMMERCIAL

## 2024-07-29 DIAGNOSIS — R51.9 FREQUENT HEADACHES: ICD-10-CM

## 2024-07-29 DIAGNOSIS — I71.43 INFRARENAL ABDOMINAL AORTIC ANEURYSM (AAA) WITHOUT RUPTURE: Primary | ICD-10-CM

## 2024-07-29 DIAGNOSIS — I63.00 CEREBROVASCULAR ACCIDENT (CVA) DUE TO THROMBOSIS OF PRECEREBRAL ARTERY: ICD-10-CM

## 2024-07-29 PROCEDURE — 70450 CT HEAD/BRAIN W/O DYE: CPT

## 2024-07-31 ENCOUNTER — TELEPHONE (OUTPATIENT)
Dept: NEUROSURGERY | Facility: CLINIC | Age: 74
End: 2024-07-31
Payer: COMMERCIAL
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
## 2024-08-14 ENCOUNTER — TELEPHONE (OUTPATIENT)
Dept: INTERNAL MEDICINE | Age: 74
End: 2024-08-14

## 2024-08-14 NOTE — TELEPHONE ENCOUNTER
Caller: Alexandre Alvarado    Relationship to patient: Self    Best call back number: [phone]    Patient is needing: PATIENT CALLED IN TO FIND OUT WHO HE WAS REFERRED TO FOR PSYCHIATRIC EVALUATION. PATIENT HUNG UP BEFORE I COULD FIND OUT FROM [facility] WHO HE WAS REFERRED TO. IF PATIENT CALLS BACK, PLEASE SEND TO [facility] [phone] SINCE THEY WILL HAVE TO BE THE ONES TO SCHEDULE THIS APPT.

## 2024-08-25 DIAGNOSIS — M17.0 BILATERAL PRIMARY OSTEOARTHRITIS OF KNEE: ICD-10-CM

## 2024-08-26 RX ORDER — GABAPENTIN 100 MG/1
100 CAPSULE ORAL 2 TIMES DAILY PRN
Qty: 30 CAPSULE | Refills: 0 | Status: SHIPPED | OUTPATIENT
Start: 2024-08-26

## 2024-09-04 ENCOUNTER — OFFICE VISIT (OUTPATIENT)
Dept: VASCULAR SURGERY | Facility: HOSPITAL | Age: 74
End: 2024-09-04
Payer: MEDICARE

## 2024-09-04 ENCOUNTER — HOSPITAL ENCOUNTER (OUTPATIENT)
Dept: CARDIOLOGY | Facility: HOSPITAL | Age: 74
Discharge: HOME | End: 2024-09-04
Payer: MEDICARE

## 2024-09-04 VITALS
RESPIRATION RATE: 18 BRPM | DIASTOLIC BLOOD PRESSURE: 80 MMHG | SYSTOLIC BLOOD PRESSURE: 132 MMHG | OXYGEN SATURATION: 98 % | HEART RATE: 73 BPM | TEMPERATURE: 97.6 F

## 2024-09-04 DIAGNOSIS — I71.43 INFRARENAL ABDOMINAL AORTIC ANEURYSM (AAA) WITHOUT RUPTURE: ICD-10-CM

## 2024-09-04 DIAGNOSIS — I71.43 INFRARENAL ABDOMINAL AORTIC ANEURYSM (AAA) WITHOUT RUPTURE: Primary | ICD-10-CM

## 2024-09-04 LAB
ABDOMINAL DIST AORTA AP: 1.7 CM
ABDOMINAL DIST AORTA TRANS: 2 CM
ABDOMINAL LT COM ILIAC AP: 1.1 CM
ABDOMINAL LT COM ILIAC TRANS: 1 CM
ABDOMINAL MID AORTA AP: 1.9 CM
ABDOMINAL MID AORTA TRANS: 1.5 CM
ABDOMINAL PROX AORTA AP: 1.9 CM
ABDOMINAL PROX AORTA TRANS: 1.7 CM
ABDOMINAL RT COM ILIAC AP: 1 CM
ABDOMINAL RT COM ILIAC TRANS: 1.1 CM

## 2024-09-04 PROCEDURE — 93978 VASCULAR STUDY: CPT | Performed by: SURGERY

## 2024-09-04 PROCEDURE — 3075F SYST BP GE 130 - 139MM HG: CPT | Performed by: NURSE PRACTITIONER

## 2024-09-04 PROCEDURE — 99213 OFFICE O/P EST LOW 20 MIN: CPT | Performed by: NURSE PRACTITIONER

## 2024-09-04 PROCEDURE — 93978 VASCULAR STUDY: CPT

## 2024-09-04 PROCEDURE — 3079F DIAST BP 80-89 MM HG: CPT | Performed by: NURSE PRACTITIONER
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
## 2024-09-24 DIAGNOSIS — M17.0 BILATERAL PRIMARY OSTEOARTHRITIS OF KNEE: ICD-10-CM

## 2024-09-24 RX ORDER — SILDENAFIL 100 MG/1
100 TABLET, FILM COATED ORAL AS NEEDED
Qty: 30 TABLET | Refills: 1 | Status: SHIPPED | OUTPATIENT
Start: 2024-09-24

## 2024-09-24 RX ORDER — GABAPENTIN 100 MG/1
100 CAPSULE ORAL 2 TIMES DAILY PRN
Qty: 30 CAPSULE | Refills: 0 | Status: SHIPPED | OUTPATIENT
Start: 2024-09-24

## 2024-09-30 ENCOUNTER — OFFICE VISIT (OUTPATIENT)
Dept: PSYCHIATRY | Facility: CLINIC | Age: 74
End: 2024-09-30
Payer: COMMERCIAL

## 2024-09-30 VITALS
BODY MASS INDEX: 27.02 KG/M2 | DIASTOLIC BLOOD PRESSURE: 60 MMHG | HEART RATE: 97 BPM | HEIGHT: 71 IN | WEIGHT: 193 LBS | SYSTOLIC BLOOD PRESSURE: 124 MMHG

## 2024-09-30 DIAGNOSIS — F41.0 PANIC ATTACKS: ICD-10-CM

## 2024-09-30 DIAGNOSIS — F51.04 INSOMNIA, PSYCHOPHYSIOLOGICAL: ICD-10-CM

## 2024-09-30 DIAGNOSIS — F43.10 POST TRAUMATIC STRESS DISORDER (PTSD): ICD-10-CM

## 2024-09-30 DIAGNOSIS — F33.1 MDD (MAJOR DEPRESSIVE DISORDER), RECURRENT EPISODE, MODERATE: Primary | ICD-10-CM

## 2024-09-30 PROCEDURE — 90792 PSYCH DIAG EVAL W/MED SRVCS: CPT
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
## 2024-10-16 ENCOUNTER — LAB (OUTPATIENT)
Dept: LAB | Facility: HOSPITAL | Age: 74
End: 2024-10-16
Payer: MEDICARE

## 2024-10-16 DIAGNOSIS — I10 HYPERTENSION, ESSENTIAL: ICD-10-CM

## 2024-10-16 DIAGNOSIS — R73.01 IFG (IMPAIRED FASTING GLUCOSE): ICD-10-CM

## 2024-10-16 DIAGNOSIS — G60.9 PERIPHERAL NEUROPATHY, IDIOPATHIC: ICD-10-CM

## 2024-10-16 DIAGNOSIS — E55.9 VITAMIN D DEFICIENCY: ICD-10-CM

## 2024-10-16 DIAGNOSIS — I49.5 SSS (SICK SINUS SYNDROME): ICD-10-CM

## 2024-10-16 DIAGNOSIS — Z95.0 PACEMAKER: ICD-10-CM

## 2024-10-16 DIAGNOSIS — Z12.5 SCREENING PSA (PROSTATE SPECIFIC ANTIGEN): ICD-10-CM

## 2024-10-16 DIAGNOSIS — E78.2 MIXED HYPERLIPIDEMIA: ICD-10-CM

## 2024-10-16 DIAGNOSIS — I63.00 CEREBROVASCULAR ACCIDENT (CVA) DUE TO THROMBOSIS OF PRECEREBRAL ARTERY: ICD-10-CM

## 2024-10-16 LAB
ALBUMIN SERPL-MCNC: 4.4 G/DL (ref 3.5–5.2)
ALBUMIN UR-MCNC: 2 MG/DL
ALBUMIN/GLOB SERPL: 1.5 G/DL
ALP SERPL-CCNC: 58 U/L (ref 39–117)
ALT SERPL W P-5'-P-CCNC: 22 U/L (ref 1–41)
ANION GAP SERPL CALCULATED.3IONS-SCNC: 12.9 MMOL/L (ref 5–15)
AST SERPL-CCNC: 23 U/L (ref 1–40)
BASOPHILS # BLD AUTO: 0.04 10*3/MM3 (ref 0–0.2)
BASOPHILS NFR BLD AUTO: 0.5 % (ref 0–1.5)
BILIRUB SERPL-MCNC: 0.5 MG/DL (ref 0–1.2)
BUN SERPL-MCNC: 17 MG/DL (ref 8–23)
BUN/CREAT SERPL: 16.5 (ref 7–25)
CALCIUM SPEC-SCNC: 9.4 MG/DL (ref 8.6–10.5)
CHLORIDE SERPL-SCNC: 104 MMOL/L (ref 98–107)
CO2 SERPL-SCNC: 23.1 MMOL/L (ref 22–29)
CREAT SERPL-MCNC: 1.03 MG/DL (ref 0.76–1.27)
DEPRECATED RDW RBC AUTO: 41.4 FL (ref 37–54)
EGFRCR SERPLBLD CKD-EPI 2021: 75.3 ML/MIN/1.73
EOSINOPHIL # BLD AUTO: 0.1 10*3/MM3 (ref 0–0.4)
EOSINOPHIL NFR BLD AUTO: 1.2 % (ref 0.3–6.2)
ERYTHROCYTE [DISTWIDTH] IN BLOOD BY AUTOMATED COUNT: 11.8 % (ref 12.3–15.4)
GLOBULIN UR ELPH-MCNC: 3 GM/DL
GLUCOSE SERPL-MCNC: 124 MG/DL (ref 65–99)
HBA1C MFR BLD: 6.2 % (ref 4.8–5.6)
HCT VFR BLD AUTO: 43.9 % (ref 37.5–51)
HGB BLD-MCNC: 14.6 G/DL (ref 13–17.7)
IMM GRANULOCYTES # BLD AUTO: 0.02 10*3/MM3 (ref 0–0.05)
IMM GRANULOCYTES NFR BLD AUTO: 0.2 % (ref 0–0.5)
LYMPHOCYTES # BLD AUTO: 3.54 10*3/MM3 (ref 0.7–3.1)
LYMPHOCYTES NFR BLD AUTO: 42.1 % (ref 19.6–45.3)
MCH RBC QN AUTO: 31.7 PG (ref 26.6–33)
MCHC RBC AUTO-ENTMCNC: 33.3 G/DL (ref 31.5–35.7)
MCV RBC AUTO: 95.2 FL (ref 79–97)
MONOCYTES # BLD AUTO: 0.73 10*3/MM3 (ref 0.1–0.9)
MONOCYTES NFR BLD AUTO: 8.7 % (ref 5–12)
NEUTROPHILS NFR BLD AUTO: 3.98 10*3/MM3 (ref 1.7–7)
NEUTROPHILS NFR BLD AUTO: 47.3 % (ref 42.7–76)
NRBC BLD AUTO-RTO: 0 /100 WBC (ref 0–0.2)
PLATELET # BLD AUTO: 273 10*3/MM3 (ref 140–450)
PMV BLD AUTO: 10.3 FL (ref 6–12)
POTASSIUM SERPL-SCNC: 4.1 MMOL/L (ref 3.5–5.2)
PROT SERPL-MCNC: 7.4 G/DL (ref 6–8.5)
RBC # BLD AUTO: 4.61 10*6/MM3 (ref 4.14–5.8)
SODIUM SERPL-SCNC: 140 MMOL/L (ref 136–145)
WBC NRBC COR # BLD AUTO: 8.41 10*3/MM3 (ref 3.4–10.8)

## 2024-10-16 PROCEDURE — 85025 COMPLETE CBC W/AUTO DIFF WBC: CPT

## 2024-10-16 PROCEDURE — 36415 COLL VENOUS BLD VENIPUNCTURE: CPT

## 2024-10-16 PROCEDURE — 80053 COMPREHEN METABOLIC PANEL: CPT

## 2024-10-16 PROCEDURE — 82043 UR ALBUMIN QUANTITATIVE: CPT

## 2024-10-16 PROCEDURE — 83036 HEMOGLOBIN GLYCOSYLATED A1C: CPT

## 2024-10-21 ENCOUNTER — OFFICE VISIT (OUTPATIENT)
Dept: INTERNAL MEDICINE | Age: 74
End: 2024-10-21
Payer: MEDICARE

## 2024-10-21 VITALS
SYSTOLIC BLOOD PRESSURE: 159 MMHG | HEART RATE: 82 BPM | TEMPERATURE: 98.2 F | HEIGHT: 71 IN | OXYGEN SATURATION: 92 % | BODY MASS INDEX: 27.58 KG/M2 | DIASTOLIC BLOOD PRESSURE: 87 MMHG | WEIGHT: 197 LBS

## 2024-10-21 DIAGNOSIS — Z00.00 MEDICARE ANNUAL WELLNESS VISIT, SUBSEQUENT: Primary | ICD-10-CM

## 2024-10-21 DIAGNOSIS — E55.9 VITAMIN D DEFICIENCY: ICD-10-CM

## 2024-10-21 DIAGNOSIS — N40.0 PROSTATIC HYPERTROPHY: ICD-10-CM

## 2024-10-21 DIAGNOSIS — E78.2 MIXED HYPERLIPIDEMIA: ICD-10-CM

## 2024-10-21 DIAGNOSIS — I10 HYPERTENSION, ESSENTIAL: ICD-10-CM

## 2024-10-21 DIAGNOSIS — R73.01 IFG (IMPAIRED FASTING GLUCOSE): ICD-10-CM

## 2024-10-21 DIAGNOSIS — Z95.0 PACEMAKER: ICD-10-CM

## 2024-10-21 DIAGNOSIS — K25.9 GASTRIC ULCER DUE TO NONSTEROIDAL ANTIINFLAMMATORY DRUG (NSAID) THERAPY: ICD-10-CM
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
## 2024-10-21 DIAGNOSIS — Z12.5 SCREENING PSA (PROSTATE SPECIFIC ANTIGEN): ICD-10-CM

## 2024-10-21 DIAGNOSIS — G89.29 CHRONIC RIGHT-SIDED LOW BACK PAIN WITHOUT SCIATICA: ICD-10-CM

## 2024-10-21 DIAGNOSIS — I49.5 SSS (SICK SINUS SYNDROME): ICD-10-CM

## 2024-10-21 DIAGNOSIS — I48.0 PAROXYSMAL ATRIAL FIBRILLATION: ICD-10-CM

## 2024-10-21 DIAGNOSIS — M54.50 CHRONIC RIGHT-SIDED LOW BACK PAIN WITHOUT SCIATICA: ICD-10-CM

## 2024-10-21 DIAGNOSIS — T39.395A GASTRIC ULCER DUE TO NONSTEROIDAL ANTIINFLAMMATORY DRUG (NSAID) THERAPY: ICD-10-CM

## 2024-10-21 PROCEDURE — 3079F DIAST BP 80-89 MM HG: CPT | Performed by: INTERNAL MEDICINE

## 2024-10-21 PROCEDURE — 3077F SYST BP >= 140 MM HG: CPT | Performed by: INTERNAL MEDICINE

## 2024-10-21 PROCEDURE — 99214 OFFICE O/P EST MOD 30 MIN: CPT | Performed by: INTERNAL MEDICINE

## 2024-10-21 PROCEDURE — 1170F FXNL STATUS ASSESSED: CPT | Performed by: INTERNAL MEDICINE

## 2024-10-21 PROCEDURE — 1125F AMNT PAIN NOTED PAIN PRSNT: CPT | Performed by: INTERNAL MEDICINE

## 2024-10-21 PROCEDURE — G0439 PPPS, SUBSEQ VISIT: HCPCS | Performed by: INTERNAL MEDICINE

## 2024-10-21 RX ORDER — DEXAMETHASONE 4 MG/1
4 TABLET ORAL 3 TIMES DAILY
Qty: 15 TABLET | Refills: 0 | Status: SHIPPED | OUTPATIENT
Start: 2024-10-21
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
## 2024-10-28 DIAGNOSIS — M17.0 BILATERAL PRIMARY OSTEOARTHRITIS OF KNEE: ICD-10-CM

## 2024-10-29 RX ORDER — GABAPENTIN 100 MG/1
100 CAPSULE ORAL 2 TIMES DAILY PRN
Qty: 30 CAPSULE | Refills: 0 | Status: SHIPPED | OUTPATIENT
Start: 2024-10-29

## 2024-10-30 ENCOUNTER — TELEPHONE (OUTPATIENT)
Dept: INTERNAL MEDICINE | Age: 74
End: 2024-10-30
Payer: MEDICARE

## 2024-10-30 DIAGNOSIS — G89.29 CHRONIC LOW BACK PAIN, UNSPECIFIED BACK PAIN LATERALITY, UNSPECIFIED WHETHER SCIATICA PRESENT: Primary | ICD-10-CM

## 2024-10-30 DIAGNOSIS — M54.50 CHRONIC LOW BACK PAIN, UNSPECIFIED BACK PAIN LATERALITY, UNSPECIFIED WHETHER SCIATICA PRESENT: Primary | ICD-10-CM

## 2024-10-30 NOTE — TELEPHONE ENCOUNTER
Put in referral for [facility] pain management for continued low back pain,    Put in referral and order for physical therapy at [facility] for back pain

## 2024-11-02 DIAGNOSIS — E78.2 MIXED HYPERLIPIDEMIA: Primary | ICD-10-CM

## 2024-11-02 DIAGNOSIS — I10 HYPERTENSION, ESSENTIAL: ICD-10-CM

## 2024-11-04 RX ORDER — BENAZEPRIL HYDROCHLORIDE AND HYDROCHLOROTHIAZIDE 20; 12.5 MG/1; MG/1
1 TABLET ORAL DAILY
Qty: 90 TABLET | Refills: 1 | Status: SHIPPED | OUTPATIENT
Start: 2024-11-04

## 2024-11-04 RX ORDER — ROSUVASTATIN CALCIUM 20 MG/1
20 TABLET, COATED ORAL DAILY
Qty: 90 TABLET | Refills: 3 | Status: SHIPPED | OUTPATIENT
Start: 2024-11-04

## 2024-11-07 RX ORDER — CYCLOBENZAPRINE HCL 5 MG
5 TABLET ORAL 2 TIMES DAILY PRN
Qty: 45 TABLET | Refills: 1 | Status: SHIPPED | OUTPATIENT
Start: 2024-11-07

## 2024-11-07 RX ORDER — CYCLOBENZAPRINE HCL 5 MG
5 TABLET ORAL 2 TIMES DAILY PRN
Qty: 45 TABLET | Refills: 1 | OUTPATIENT
Start: 2024-11-07

## 2024-11-20 RX ORDER — SERTRALINE HYDROCHLORIDE 25 MG/1
25 TABLET, FILM COATED ORAL DAILY
Qty: 90 TABLET | Refills: 0 | Status: SHIPPED | OUTPATIENT
Start: 2024-11-20

## 2024-11-25 DIAGNOSIS — M17.0 BILATERAL PRIMARY OSTEOARTHRITIS OF KNEE: ICD-10-CM

## 2024-11-25 RX ORDER — GABAPENTIN 100 MG/1
100 CAPSULE ORAL 2 TIMES DAILY PRN
Qty: 30 CAPSULE | Refills: 0 | Status: SHIPPED | OUTPATIENT
Start: 2024-11-25

## 2024-12-05 ENCOUNTER — OFFICE VISIT (OUTPATIENT)
Dept: CARDIOLOGY | Facility: CLINIC | Age: 74
End: 2024-12-05
Payer: MEDICARE

## 2024-12-05 VITALS
DIASTOLIC BLOOD PRESSURE: 83 MMHG | BODY MASS INDEX: 26.92 KG/M2 | HEART RATE: 94 BPM | WEIGHT: 192.3 LBS | SYSTOLIC BLOOD PRESSURE: 150 MMHG | HEIGHT: 71 IN

## 2024-12-05 DIAGNOSIS — I10 HYPERTENSION, ESSENTIAL: ICD-10-CM

## 2024-12-05 DIAGNOSIS — Z95.0 PACEMAKER: ICD-10-CM

## 2024-12-05 DIAGNOSIS — R01.1 HEART MURMUR: ICD-10-CM

## 2024-12-05 DIAGNOSIS — I48.0 PAROXYSMAL ATRIAL FIBRILLATION: Primary | ICD-10-CM

## 2024-12-05 PROBLEM — I49.5 SSS (SICK SINUS SYNDROME): Status: RESOLVED | Noted: 2023-10-31 | Resolved: 2024-12-05

## 2024-12-05 RX ORDER — HYDROCODONE BITARTRATE AND ACETAMINOPHEN 5; 325 MG/1; MG/1
1 TABLET ORAL AS NEEDED
COMMUNITY
Start: 2024-11-08

## 2024-12-05 NOTE — PROGRESS NOTES
"Chief Complaint  Follow-up, Atrial Fibrillation, Hypertension, and Hyperlipidemia    Subjective            History of Present Illness  Alexandre Alvarado is a 74-year-old male patient who presents to the office today for follow-up.  He has atrial fibrillation, presence of pacemaker, and hypertension.  He is compliant with medication.  He denies any new or worsening cardiac symptoms today.    PMH  Past Medical History:   Diagnosis Date    Abdominal aortic aneurysm (AAA)     Advance directive discussed with patient 08/11/2020    Anxiety 06/04/2024    Arthritis     Bursitis of hip 2019    Cataract 2021    Corrected    Chronic pain disorder 06/04/2024    Depression     Diverticulosis     Erectile dysfunction 2019    Fracture, finger 1967    Frequent headaches 06/13/2024    Great toe pain, left 08/11/2020    History of colonoscopy 08/11/2020    Hyperlipidemia     Hypertension, essential     Knee sprain 2022    Both knees left May. Right July    Low back pain 1992    Lumbosacral disc disease 1992    Operations  1992   1998 and 2000 when fused    Murmur, cardiac     Myocardial infarction 2011    Pacemaker 08/11/2020    Panic disorder 06/2024    Paroxysmal atrial fibrillation 12/13/2021    Prostatic hypertrophy     Benign    Pulmonary arterial hypertension     Rectal bleeding     Seasonal allergies     SSS (sick sinus syndrome)     Stomach ulcer     Stroke 2019    Tendinitis of knee 2022    Tennis elbow 1988    Various times    TIA (transient ischemic attack)          ALLERGY  Allergies   Allergen Reactions    Ibuprofen GI Intolerance     Patient states \"I get ulcers\"    Nsaids GI Intolerance     Pt states ulcers when taking it.           SURGICALHX  Past Surgical History:   Procedure Laterality Date    BACK SURGERY      CARDIAC CATHETERIZATION  Nov2011    CARDIAC SURGERY      COLONOSCOPY  2016    Dr Ponce    ENDOSCOPY      ENDOSCOPY N/A 08/10/2021    Procedure: ESOPHAGOGASTRODUODENOSCOPY;  Surgeon: Reginald Camarillo MD;  " Location: [facility];  Service: Gastroenterology;  Laterality: N/A;  hiatal hernia    EYE SURGERY      Cataracts    INSERT / REPLACE / REMOVE PACEMAKER      SPINAL FUSION  2002    SPINE SURGERY  1992 1998 2000    TRIGGER POINT INJECTION  2010    Others dates. Hand foot injections          SOC  Social History     Socioeconomic History    Marital status:    Tobacco Use    Smoking status: Never    Smokeless tobacco: Never   Vaping Use    Vaping status: Never Used   Substance and Sexual Activity    Alcohol use: Yes     Alcohol/week: 1.0 standard drink of alcohol     Types: 1 Glasses of wine per week     Comment: Current some day    Drug use: Never    Sexual activity: Yes     Partners: Female     Birth control/protection: Vasectomy, Surgical         FAMHX  Family History   Problem Relation Age of Onset    Cancer Mother         Passed away. Sever arthritis and dementia brest cancer    Osteoporosis Mother     Arthritis Mother     COPD Father     Stroke Father     Heart disease Father     Cancer Father         Passed COPD. Smoking    Diabetes Other     Heart failure Other     Other Other         spine problems    Malig Hyperthermia Neg Hx           MEDSIGONLY  Current Outpatient Medications on File Prior to Visit   Medication Sig    amLODIPine (NORVASC) 10 MG tablet Take 1 tablet by mouth Daily.    apixaban (Eliquis) 5 MG tablet tablet Take 1 tablet by mouth 2 (Two) Times a Day.    benazepril-hydrochlorthiazide (LOTENSIN HCT) 20-12.5 MG per tablet TAKE 1 TABLET BY MOUTH DAILY    cyclobenzaprine (FLEXERIL) 5 MG tablet TAKE 1 TABLET BY MOUTH TWICE A DAY AS NEEDED FOR MUSCLE SPASMS    gabapentin (NEURONTIN) 100 MG capsule Take 1 capsule by mouth 2 (Two) Times a Day As Needed (Pain).    HYDROcodone-acetaminophen (NORCO) 5-325 MG per tablet Take 1 tablet by mouth As Needed.    naloxone (Narcan) 4 MG/0.1ML nasal spray Administer 1 spray into the nostril(s) as directed by provider As Needed.    rosuvastatin (CRESTOR)  "20 MG tablet TAKE 1 TABLET BY MOUTH DAILY    sertraline (ZOLOFT) 25 MG tablet TAKE 1 TABLET BY MOUTH DAILY    sildenafil (VIAGRA) 100 MG tablet Take 1 tablet by mouth As Needed for Erectile Dysfunction.    [DISCONTINUED] dexAMETHasone (DECADRON) 4 MG tablet Take 1 tablet by mouth 3 times a day.     No current facility-administered medications on file prior to visit.         Objective   /83   Pulse 94   Ht 180.3 cm (70.98\")   Wt 87.2 kg (192 lb 4.8 oz)   BMI 26.83 kg/m²       Physical Exam  HENT:      Head: Normocephalic.   Neck:      Vascular: No carotid bruit.   Cardiovascular:      Rate and Rhythm: Normal rate and regular rhythm.      Pulses: Normal pulses.      Heart sounds: Normal heart sounds. Murmur heard.   Pulmonary:      Effort: Pulmonary effort is normal.      Breath sounds: Normal breath sounds.   Musculoskeletal:      Cervical back: Neck supple.      Right lower leg: No edema.      Left lower leg: No edema.   Skin:     General: Skin is dry.   Neurological:      Mental Status: He is alert and oriented to person, place, and time.   Psychiatric:         Behavior: Behavior normal.       Result Review :   The following data was reviewed by: TA Romano on 12/05/2024:  No results found for: \"PROBNP\"  CMP          10/16/2024    09:45   CMP   Glucose 124    BUN 17    Creatinine 1.03    EGFR 75.3    Sodium 140    Potassium 4.1    Chloride 104    Calcium 9.4    Total Protein 7.4    Albumin 4.4    Globulin 3.0    Total Bilirubin 0.5    Alkaline Phosphatase 58    AST (SGOT) 23    ALT (SGPT) 22    Albumin/Globulin Ratio 1.5    BUN/Creatinine Ratio 16.5    Anion Gap 12.9      CBC w/diff          10/16/2024    09:45   CBC w/Diff   WBC 8.41    RBC 4.61    Hemoglobin 14.6    Hematocrit 43.9    MCV 95.2    MCH 31.7    MCHC 33.3    RDW 11.8    Platelets 273    Neutrophil Rel % 47.3    Immature Granulocyte Rel % 0.2    Lymphocyte Rel % 42.1    Monocyte Rel % 8.7    Eosinophil Rel % 1.2    Basophil Rel % " "0.5       Lab Results   Component Value Date    TSH 2.110 01/06/2023      Lab Results   Component Value Date    FREET4 1.22 01/06/2023      No results found for: \"DDIMERQUANT\"  No results found for: \"MG\"   No results found for: \"DIGOXIN\"   No results found for: \"TROPONINT\"            2/15/2024    10:48   Lipid Panel   Total Cholesterol 123    Triglycerides 56    HDL Cholesterol 56    VLDL Cholesterol 12    LDL Cholesterol  55    LDL/HDL Ratio 1.00        Results for orders placed during the hospital encounter of 05/09/23    Adult Transthoracic Echo Complete W/ Cont if Necessary Per Protocol    Interpretation Summary  Left ventricular hypertrophy with normal left ventricular systolic function.  No significant valve abnormalities noted.      [id number] Score: 6          Assessment and Plan    Diagnoses and all orders for this visit:    1. Paroxysmal atrial fibrillation (Primary)  Symptomatically stable at this time, rate controlled, continue Eliquis for CVA prevention.    2. Pacemaker  Last home remote session was 8/15/2024 with no new events.  He has 7-1/2 years left on the battery life.    3. Hypertension, essential  Currently elevated in office today, check blood pressure twice a day for the next two weeks, blood pressure log provided for patient.  For now continue amlodipine 10 mg daily along with benazepril hydrochlorothiazide 20-12.5 mg daily.  Will make medication adjustments if needed upon review of blood pressure log.    4. Heart murmur  Mild heart murmur heard on exam today, recommend obtaining echocardiogram to further evaluate  -     Adult Transthoracic Echo Complete W/ Cont if Necessary Per Protocol; Future        Follow Up   Return in about 6 months (around 6/5/2025) for Follow up with Dr Dahl with device check.    Patient was given instructions and counseling regarding his condition or for health maintenance advice. Please see specific information pulled into the AVS if appropriate.     Alexandre AMOR " Necessary  reports that he has never smoked. He has never used smokeless tobacco.          Shannan Berkowitz, TA  12/05/24  14:56 EST    Dictated Utilizing Dragon Dictation

## 2024-12-17 ENCOUNTER — HOSPITAL ENCOUNTER (OUTPATIENT)
Dept: CARDIOLOGY | Facility: HOSPITAL | Age: 74
Discharge: HOME | End: 2024-12-17
Admitting: NURSE PRACTITIONER
Payer: MEDICARE

## 2024-12-17 DIAGNOSIS — R01.1 HEART MURMUR: ICD-10-CM

## 2024-12-17 PROCEDURE — 93306 TTE W/DOPPLER COMPLETE: CPT

## 2024-12-18 LAB
AORTIC DIMENSIONLESS INDEX: 1.06 (DI)
ASCENDING AORTA: 3.6 CM
BH CV ECHO MEAS - ACS: 1.6 CM
BH CV ECHO MEAS - AO MAX PG: 11.3 MMHG
BH CV ECHO MEAS - AO MEAN PG: 7 MMHG
BH CV ECHO MEAS - AO ROOT DIAM: 3.4 CM
BH CV ECHO MEAS - AO V2 MAX: 168 CM/SEC
BH CV ECHO MEAS - AO V2 VTI: 32.4 CM
BH CV ECHO MEAS - AVA(I,D): 2.8 CM2
BH CV ECHO MEAS - EDV(CUBED): 79.5 ML
BH CV ECHO MEAS - EDV(MOD-SP2): 50.5 ML
BH CV ECHO MEAS - EDV(MOD-SP4): 45.5 ML
BH CV ECHO MEAS - EF(MOD-BP): 57.6 %
BH CV ECHO MEAS - EF(MOD-SP2): 64 %
BH CV ECHO MEAS - EF(MOD-SP4): 51.9 %
BH CV ECHO MEAS - ESV(CUBED): 12.2 ML
BH CV ECHO MEAS - ESV(MOD-SP2): 18.2 ML
BH CV ECHO MEAS - ESV(MOD-SP4): 21.9 ML
BH CV ECHO MEAS - FS: 46.5 %
BH CV ECHO MEAS - IVS/LVPW: 0.83 CM
BH CV ECHO MEAS - IVSD: 1 CM
BH CV ECHO MEAS - LA DIMENSION: 4.3 CM
BH CV ECHO MEAS - LAT PEAK E' VEL: 8.5 CM/SEC
BH CV ECHO MEAS - LV DIASTOLIC VOL/BSA (35-75): 22.2 CM2
BH CV ECHO MEAS - LV MASS(C)D: 162.9 GRAMS
BH CV ECHO MEAS - LV MAX PG: 9.6 MMHG
BH CV ECHO MEAS - LV MEAN PG: 5 MMHG
BH CV ECHO MEAS - LV SYSTOLIC VOL/BSA (12-30): 10.7 CM2
BH CV ECHO MEAS - LV V1 MAX: 155 CM/SEC
BH CV ECHO MEAS - LV V1 VTI: 29.3 CM
BH CV ECHO MEAS - LVIDD: 4.3 CM
BH CV ECHO MEAS - LVIDS: 2.3 CM
BH CV ECHO MEAS - LVOT AREA: 3.1 CM2
BH CV ECHO MEAS - LVOT DIAM: 2 CM
BH CV ECHO MEAS - LVPWD: 1.2 CM
BH CV ECHO MEAS - MED PEAK E' VEL: 7 CM/SEC
BH CV ECHO MEAS - MV A MAX VEL: 80.4 CM/SEC
BH CV ECHO MEAS - MV DEC SLOPE: 759 CM/SEC2
BH CV ECHO MEAS - MV DEC TIME: 0.22 SEC
BH CV ECHO MEAS - MV E MAX VEL: 53.8 CM/SEC
BH CV ECHO MEAS - MV E/A: 0.67
BH CV ECHO MEAS - MV MEAN PG: 2 MMHG
BH CV ECHO MEAS - MV P1/2T: 39 MSEC
BH CV ECHO MEAS - MV V2 VTI: 21 CM
BH CV ECHO MEAS - MVA(P1/2T): 5.6 CM2
BH CV ECHO MEAS - MVA(VTI): 4.4 CM2
BH CV ECHO MEAS - PA V2 MAX: 149 CM/SEC
BH CV ECHO MEAS - PULM A REVS DUR: 0.15 SEC
BH CV ECHO MEAS - PULM A REVS VEL: 48.8 CM/SEC
BH CV ECHO MEAS - PULM DIAS VEL: 42 CM/SEC
BH CV ECHO MEAS - PULM S/D: 1.36
BH CV ECHO MEAS - PULM SYS VEL: 57 CM/SEC
BH CV ECHO MEAS - QP/QS: 0.69
BH CV ECHO MEAS - RAP SYSTOLE: 5 MMHG
BH CV ECHO MEAS - RV MAX PG: 5 MMHG
BH CV ECHO MEAS - RV V1 MAX: 112 CM/SEC
BH CV ECHO MEAS - RV V1 VTI: 20.3 CM
BH CV ECHO MEAS - RVDD: 3.6 CM
BH CV ECHO MEAS - RVOT DIAM: 2 CM
BH CV ECHO MEAS - RVSP: 43.7 MMHG
BH CV ECHO MEAS - SV(LVOT): 92 ML
BH CV ECHO MEAS - SV(MOD-SP2): 32.3 ML
BH CV ECHO MEAS - SV(MOD-SP4): 23.6 ML
BH CV ECHO MEAS - SV(RVOT): 63.8 ML
BH CV ECHO MEAS - SVI(LVOT): 44.9 ML/M2
BH CV ECHO MEAS - SVI(MOD-SP2): 15.7 ML/M2
BH CV ECHO MEAS - SVI(MOD-SP4): 11.5 ML/M2
BH CV ECHO MEAS - TAPSE (>1.6): 2.29 CM
BH CV ECHO MEAS - TR MAX PG: 38.7 MMHG
BH CV ECHO MEAS - TR MAX VEL: 311 CM/SEC
BH CV ECHO MEASUREMENTS AVERAGE E/E' RATIO: 6.94
BH CV XLRA - TDI S': 17.3 CM/SEC
IVRT: 79 MS
LEFT ATRIUM VOLUME INDEX: 14.2 ML/M2

## 2024-12-19 DIAGNOSIS — M17.0 BILATERAL PRIMARY OSTEOARTHRITIS OF KNEE: ICD-10-CM

## 2024-12-20 RX ORDER — AMLODIPINE BESYLATE 10 MG/1
10 TABLET ORAL DAILY
Qty: 30 TABLET | Refills: 0 | Status: SHIPPED | OUTPATIENT
Start: 2024-12-20

## 2024-12-20 RX ORDER — GABAPENTIN 100 MG/1
100 CAPSULE ORAL 2 TIMES DAILY PRN
Qty: 30 CAPSULE | Refills: 0 | Status: SHIPPED | OUTPATIENT
Start: 2024-12-20

## 2025-01-07 ENCOUNTER — TELEPHONE (OUTPATIENT)
Dept: CARDIOLOGY | Facility: CLINIC | Age: 75
End: 2025-01-07
Payer: MEDICARE

## 2025-01-07 RX ORDER — BENAZEPRIL HYDROCHLORIDE AND HYDROCHLOROTHIAZIDE 20; 12.5 MG/1; MG/1
2 TABLET ORAL DAILY
Qty: 180 TABLET | Refills: 3 | Status: SHIPPED | OUTPATIENT
Start: 2025-01-07

## 2025-01-07 NOTE — TELEPHONE ENCOUNTER
----- Message from Shannan Berkowitz sent at 1/6/2025 11:51 AM EST -----  Blood pressures still slightly elevated, recommend increase benazepril/hctz dose to 40/25 mg daily. Continue to monitor BP twice daily for the next week  ----- Message -----  From: Shima Easley RegSched Rep  Sent: 1/3/2025  10:44 AM EST  To: TA Rodriguez

## 2025-01-08 ENCOUNTER — TELEPHONE (OUTPATIENT)
Dept: CARDIOLOGY | Facility: CLINIC | Age: 75
End: 2025-01-08
Payer: MEDICARE

## 2025-01-08 NOTE — TELEPHONE ENCOUNTER
I left  Necessary a message letting him know that I would like to talk to him about wearing a Holter monitor and explain why in greater detail.    I will try to reach out to him at the end of the day.

## 2025-01-13 RX ORDER — CYCLOBENZAPRINE HCL 5 MG
5 TABLET ORAL 2 TIMES DAILY PRN
Qty: 45 TABLET | Refills: 1 | Status: SHIPPED | OUTPATIENT
Start: 2025-01-13

## 2025-01-14 ENCOUNTER — TELEPHONE (OUTPATIENT)
Dept: CARDIOLOGY | Facility: CLINIC | Age: 75
End: 2025-01-14
Payer: MEDICARE

## 2025-01-14 DIAGNOSIS — R01.1 HEART MURMUR: Primary | ICD-10-CM

## 2025-01-14 NOTE — TELEPHONE ENCOUNTER
I have left another message, I let him know that we just keep missing each other but I would try again to touch base with him.
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
## 2025-01-16 ENCOUNTER — OFFICE VISIT (OUTPATIENT)
Dept: PSYCHIATRY | Facility: CLINIC | Age: 75
End: 2025-01-16
Payer: MEDICARE

## 2025-01-16 VITALS
BODY MASS INDEX: 27.83 KG/M2 | WEIGHT: 198.8 LBS | HEART RATE: 102 BPM | DIASTOLIC BLOOD PRESSURE: 68 MMHG | HEIGHT: 71 IN | SYSTOLIC BLOOD PRESSURE: 140 MMHG

## 2025-01-16 DIAGNOSIS — F51.04 INSOMNIA, PSYCHOPHYSIOLOGICAL: ICD-10-CM

## 2025-01-16 DIAGNOSIS — F41.0 PANIC ATTACKS: ICD-10-CM

## 2025-01-16 DIAGNOSIS — F33.1 MDD (MAJOR DEPRESSIVE DISORDER), RECURRENT EPISODE, MODERATE: Primary | ICD-10-CM

## 2025-01-16 DIAGNOSIS — F43.10 POST TRAUMATIC STRESS DISORDER (PTSD): ICD-10-CM
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
## 2025-01-17 ENCOUNTER — TELEPHONE (OUTPATIENT)
Dept: INTERNAL MEDICINE | Age: 75
End: 2025-01-17
Payer: MEDICARE

## 2025-01-17 NOTE — TELEPHONE ENCOUNTER
Per Mirta Ibarra patient is agreeable to holter monitor.     Per Shannan Berkowitz, APRN:    Would recommend 7 day holter.    Orders placed.

## 2025-01-17 NOTE — TELEPHONE ENCOUNTER
Please call patient make him a telehealth visit with me next week to discuss which testing he needs what it is for exactly so that we can get the right test ordered I can do that at the end of the day but I will be out of town on Thursday and Friday of next week so it has to be during the first part of the week

## 2025-01-21 DIAGNOSIS — M17.0 BILATERAL PRIMARY OSTEOARTHRITIS OF KNEE: ICD-10-CM

## 2025-01-21 RX ORDER — GABAPENTIN 100 MG/1
100 CAPSULE ORAL 2 TIMES DAILY PRN
Qty: 30 CAPSULE | Refills: 0 | Status: SHIPPED | OUTPATIENT
Start: 2025-01-21 | End: 2025-01-22

## 2025-01-22 ENCOUNTER — TELEMEDICINE (OUTPATIENT)
Dept: INTERNAL MEDICINE | Age: 75
End: 2025-01-22
Payer: MEDICARE

## 2025-01-22 DIAGNOSIS — M54.50 ACUTE BILATERAL LOW BACK PAIN WITHOUT SCIATICA: Primary | ICD-10-CM

## 2025-01-22 DIAGNOSIS — M54.32 SCIATICA OF LEFT SIDE: ICD-10-CM

## 2025-01-22 DIAGNOSIS — M54.6 ACUTE BILATERAL THORACIC BACK PAIN: ICD-10-CM

## 2025-01-22 PROCEDURE — 1125F AMNT PAIN NOTED PAIN PRSNT: CPT | Performed by: INTERNAL MEDICINE

## 2025-01-22 PROCEDURE — 99214 OFFICE O/P EST MOD 30 MIN: CPT | Performed by: INTERNAL MEDICINE

## 2025-01-22 PROCEDURE — G2211 COMPLEX E/M VISIT ADD ON: HCPCS | Performed by: INTERNAL MEDICINE

## 2025-01-22 RX ORDER — DULOXETIN HYDROCHLORIDE 60 MG/1
60 CAPSULE, DELAYED RELEASE ORAL DAILY
Qty: 30 CAPSULE | Refills: 3 | Status: SHIPPED | OUTPATIENT
Start: 2025-01-22

## 2025-01-22 RX ORDER — GABAPENTIN 300 MG/1
300 CAPSULE ORAL 3 TIMES DAILY
Qty: 90 CAPSULE | Refills: 3 | Status: SHIPPED | OUTPATIENT
Start: 2025-01-22

## 2025-01-22 RX ORDER — CLONAZEPAM 0.5 MG/1
0.5 TABLET ORAL 2 TIMES DAILY PRN
Qty: 60 TABLET | Refills: 0 | Status: SHIPPED | OUTPATIENT
Start: 2025-01-22
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
## 2025-01-29 ENCOUNTER — HOSPITAL ENCOUNTER (OUTPATIENT)
Dept: GENERAL RADIOLOGY | Facility: HOSPITAL | Age: 75
Discharge: HOME | End: 2025-01-29
Payer: MEDICARE

## 2025-01-29 ENCOUNTER — TRANSCRIBE ORDERS (OUTPATIENT)
Dept: GENERAL RADIOLOGY | Facility: HOSPITAL | Age: 75
End: 2025-01-29
Payer: MEDICARE

## 2025-01-29 ENCOUNTER — HOSPITAL ENCOUNTER (OUTPATIENT)
Dept: CT IMAGING | Facility: HOSPITAL | Age: 75
Discharge: HOME | End: 2025-01-29
Payer: MEDICARE

## 2025-01-29 DIAGNOSIS — M54.6 ACUTE BILATERAL THORACIC BACK PAIN: ICD-10-CM

## 2025-01-29 DIAGNOSIS — M54.50 ACUTE BILATERAL LOW BACK PAIN WITHOUT SCIATICA: ICD-10-CM

## 2025-01-29 DIAGNOSIS — M47.896 OTHER SPONDYLOSIS, LUMBAR REGION: ICD-10-CM

## 2025-01-29 DIAGNOSIS — M47.896 OTHER SPONDYLOSIS, LUMBAR REGION: Primary | ICD-10-CM

## 2025-01-29 DIAGNOSIS — M54.32 SCIATICA OF LEFT SIDE: ICD-10-CM

## 2025-01-29 PROCEDURE — 72131 CT LUMBAR SPINE W/O DYE: CPT

## 2025-01-29 PROCEDURE — 72128 CT CHEST SPINE W/O DYE: CPT

## 2025-01-29 PROCEDURE — 72110 X-RAY EXAM L-2 SPINE 4/>VWS: CPT

## 2025-01-31 ENCOUNTER — TELEPHONE (OUTPATIENT)
Dept: INTERNAL MEDICINE | Age: 75
End: 2025-01-31
Payer: MEDICARE

## 2025-01-31 DIAGNOSIS — R16.0 LIVER MASSES: Primary | ICD-10-CM

## 2025-01-31 NOTE — TELEPHONE ENCOUNTER
Call patient time there was no fracture in the thoracic spine, he does have some arthritis throughout the thoracic spine, no significant spinal canal stenosis was identified, there was some places in the liver that they thought were cyst or hemangiomas and we can discuss that at his next visit, may want to get a dedicated ultrasound of the liver also as a precaution    Tell him the CT of the lumbar spine shows no acute findings the hardware appears to be intact, there is different levels of arthritis without a significant change from the previous CT of that area in 2024, there is some moderate spinal canal stenosis at L1 and L2 and some neuroforaminal stenosis also, he does have some vascular disease in the aorta and we can discuss that also at his next appointment    Tell patient I went ahead and put the ultrasound order in of the liver as a precaution and they will call him to schedule that at some point    If symptoms do not improve he will have to go see neurosurgery for second opinion

## 2025-02-11 ENCOUNTER — TELEPHONE (OUTPATIENT)
Dept: CARDIOLOGY | Facility: CLINIC | Age: 75
End: 2025-02-11
Payer: MEDICARE

## 2025-02-11 NOTE — TELEPHONE ENCOUNTER
----- Message from Shannan King sent at 2/11/2025 10:15 AM EST -----  Holter monitor shows normal sinus rhythm with average heart rate 79  There are occasional PAC's (less than 1%) and frequent PVC's (2%) occurring. There were short episodes of SVT, longest duration was 4 seconds. Recommend to avoid caffeine intake. He can do a 30 day trial of Toprol XL 25 mg daily to see if this improves symptoms. Follow up as scheduled

## 2025-02-11 NOTE — TELEPHONE ENCOUNTER
AB  patient. Went over results and recommendations. Patient would like to discuss with Dr Dahl at f/u.

## 2025-02-12 ENCOUNTER — HOSPITAL ENCOUNTER (OUTPATIENT)
Dept: ULTRASOUND IMAGING | Facility: HOSPITAL | Age: 75
Discharge: HOME | End: 2025-02-12
Admitting: INTERNAL MEDICINE
Payer: MEDICARE

## 2025-02-12 DIAGNOSIS — R16.0 LIVER MASSES: ICD-10-CM

## 2025-02-12 PROCEDURE — 76705 ECHO EXAM OF ABDOMEN: CPT

## 2025-02-12 NOTE — PROGRESS NOTES
[facility]  Cardiology progress Note    Patient Name: Alexandre Alvarado  : 1950    CHIEF COMPLAINT  Atrial fibrillation        Subjective   Subjective     HISTORY OF PRESENT ILLNESS    Alexandre Alvarado is a 75 y.o. male with history of atrial fibrillation.  Continues to have palpitations on and off.    REVIEW OF SYSTEMS    Constitutional:    No fever, no weight loss  Skin:     No rash  Otolaryngeal:    No difficulty swallowing  Cardiovascular: See HPI.  Pulmonary:    No cough, no sputum production    Personal History     Social History:    reports that he has never smoked. He has never used smokeless tobacco. He reports current alcohol use of about 1.0 standard drink of alcohol per week. He reports that he does not use drugs.    Home Medications:  Current Outpatient Medications on File Prior to Visit   Medication Sig    amLODIPine (NORVASC) 10 MG tablet Take 1 tablet by mouth Daily.    apixaban (Eliquis) 5 MG tablet tablet Take 1 tablet by mouth 2 (Two) Times a Day.    benazepril-hydrochlorthiazide (LOTENSIN HCT) 20-12.5 MG per tablet Take 2 tablets by mouth Daily.    DULoxetine (Cymbalta) 60 MG capsule Take 1 capsule by mouth Daily.    gabapentin (NEURONTIN) 300 MG capsule Take 1 capsule by mouth 3 (Three) Times a Day.    HYDROcodone-acetaminophen (NORCO) 5-325 MG per tablet Take 1 tablet by mouth As Needed.    rosuvastatin (CRESTOR) 20 MG tablet TAKE 1 TABLET BY MOUTH DAILY    sildenafil (VIAGRA) 100 MG tablet Take 1 tablet by mouth As Needed for Erectile Dysfunction.    [DISCONTINUED] clonazePAM (KlonoPIN) 0.5 MG tablet Take 1 tablet by mouth 2 (Two) Times a Day As Needed for Anxiety.     No current facility-administered medications on file prior to visit.       Past Medical History:   Diagnosis Date    Abdominal aortic aneurysm (AAA)     Advance directive discussed with patient 2020    Anxiety 2024    Arthritis     Bursitis of hip 2019    Cataract     Corrected    Chronic  "pain disorder 06/04/2024    Depression     Diverticulosis     Erectile dysfunction 2019    Fracture, finger 1967    Frequent headaches 06/13/2024    Great toe pain, left 08/11/2020    History of colonoscopy 08/11/2020    Hyperlipidemia     Hypertension, essential     Knee sprain 2022    Both knees left May. Right July    Low back pain 1992    Lumbosacral disc disease 1992    Operations  1992   1998 and 2000 when fused    Murmur, cardiac     Myocardial infarction 2011    Pacemaker 08/11/2020    Panic disorder 06/2024    Paroxysmal atrial fibrillation 12/13/2021    Prostatic hypertrophy     Benign    PTSD (post-traumatic stress disorder)     Pulmonary arterial hypertension     Rectal bleeding     Seasonal allergies     SSS (sick sinus syndrome)     Stomach ulcer     Stroke 2019    Tendinitis of knee 2022    Tennis elbow 1988    Various times    TIA (transient ischemic attack)        Allergies:  Allergies   Allergen Reactions    Ibuprofen GI Intolerance     Patient states \"I get ulcers\"    Nsaids GI Intolerance     Pt states ulcers when taking it.        Objective    Objective       Vitals:   Heart Rate:  [100] 100  BP: (161)/(92) 161/92  Body mass index is 28.41 kg/m².     PHYSICAL EXAM:    General Appearance:   well developed  well nourished  HENT:   oropharynx moist  lips not cyanotic  Neck:  thyroid not enlarged  supple  Respiratory:  no respiratory distress  normal breath sounds  no rales  Cardiovascular:  no jugular venous distention  regular rhythm  apical impulse normal  S1 normal, S2 normal  no S3, no S4   SM GR 3/6 AA  no rub, no thrill  carotid pulses normal; no bruit  pedal pulses normal  lower extremity edema: none    Skin:   warm, dry  Psychiatric:  judgement and insight appropriate  normal mood and affect        Result Review:  I have personally reviewed the available results from  [x]  Laboratory  [x]  EKG  [x]  Cardiology  [x]  Medications  [x]  Old records  []  Other:     Procedures  Lab Results "   Component Value Date    CHOL 123 02/15/2024    CHOL 138 01/06/2023    CHOL 122 12/13/2021     Lab Results   Component Value Date    TRIG 56 02/15/2024    TRIG 60 01/06/2023    TRIG 84 12/13/2021     Lab Results   Component Value Date    HDL 56 02/15/2024    HDL 60 01/06/2023    HDL 53 12/13/2021     Lab Results   Component Value Date    LDL 55 02/15/2024    LDL 65 01/06/2023    LDL 53 12/13/2021     Lab Results   Component Value Date    VLDL 12 02/15/2024    VLDL 13 01/06/2023    VLDL 16 12/13/2021     Results for orders placed during the hospital encounter of 12/17/24    Adult Transthoracic Echo Complete W/ Cont if Necessary Per Protocol    Interpretation Summary  Fibrocalcific mitral and aortic valves.  Normal left-ventricular systolic function.  Mild aortic stenosis.  Trace TR with slightly increased pulmonary artery systolic pressure by Doppler.     Impression/Plan:  1.  Sick sinus syndrome status post permanent pacemaker: Pacemaker check shows pacemaker is functioning normally.  2.  Paroxysmal atrial fibrillation/PVC: Continue Eliquis 5 mg twice a day for stroke prevention.  Able to tolerate Eliquis without any side effects.  Add carvedilol 6.25 mg twice a day in view of palpitations.  3.  Mixed hyperlipidemia: Continue Crestor 20 mg once a day.  Monitor lipid and hepatic profile.  4.  Essential hypertension Uncontrolled: Continue Lotensin/hydrochlorothiazide 20/12.5 mg twice a day.  Continue amlodipine 10 mg once a day.  Add carvedilol 6.25 mg twice a day.  Monitor blood pressure regularly at home.  5.  Shortness of breath: Echocardiogram normal left ventricular systolic function.  Stress test negative.                 Kosta Dahl MD   02/18/25   13:07 EST

## 2025-02-15 ENCOUNTER — TELEPHONE (OUTPATIENT)
Dept: INTERNAL MEDICINE | Age: 75
End: 2025-02-15
Payer: MEDICARE

## 2025-02-15 DIAGNOSIS — R16.0 LIVER MASSES: Primary | ICD-10-CM

## 2025-02-15 NOTE — TELEPHONE ENCOUNTER
Call patient tell him he has a couple lesions in the right side of the liver that measure up to about 1 inch in size they are recommending further follow-up on these with an MRI with dedicated views of the liver with and without contrast, there was also a cystic lesion within the liver in both lobes, and they recommended follow-up with an MRI, he has diffuse fatty liver normal appearance of the gallbladder,, I have placed an order for an MRI of his abdomen and liver with and without contrast and they should be calling him to get it scheduled please let him know

## 2025-02-17 ENCOUNTER — TELEPHONE (OUTPATIENT)
Dept: INTERNAL MEDICINE | Age: 75
End: 2025-02-17
Payer: MEDICARE

## 2025-02-17 DIAGNOSIS — R16.0 LIVER MASSES: Primary | ICD-10-CM

## 2025-02-17 NOTE — TELEPHONE ENCOUNTER
Patient unable to do an MRI due to having a pacemaker. He sent Dr. Pollard a message in which I forwarded to him. Will wait for advice on what to do.

## 2025-02-17 NOTE — TELEPHONE ENCOUNTER
I left message in patient portal for patient regarding MRI and that we will do an a CT scan of the abdomen and pelvis with hepatic protocol liver mass protocol, I left him that message and I spoke with the radiologist Dr. Larsen who recommended doing this in place of the MRI since he has a pacemaker, the orders have been placed patient's been made aware,    Please call the hospital and cancel the MRI that was ordered earlier for the abdomen and pelvis and liver since he has a pacemaker

## 2025-02-18 ENCOUNTER — OFFICE VISIT (OUTPATIENT)
Dept: CARDIOLOGY | Facility: CLINIC | Age: 75
End: 2025-02-18
Payer: MEDICARE

## 2025-02-18 VITALS
HEIGHT: 71 IN | WEIGHT: 203.6 LBS | OXYGEN SATURATION: 96 % | HEART RATE: 100 BPM | SYSTOLIC BLOOD PRESSURE: 161 MMHG | DIASTOLIC BLOOD PRESSURE: 92 MMHG | BODY MASS INDEX: 28.5 KG/M2

## 2025-02-18 DIAGNOSIS — Z95.0 PRESENCE OF PERMANENT CARDIAC PACEMAKER: Primary | ICD-10-CM

## 2025-02-18 DIAGNOSIS — I48.0 PAROXYSMAL ATRIAL FIBRILLATION: ICD-10-CM

## 2025-02-18 DIAGNOSIS — I10 HYPERTENSION, ESSENTIAL: ICD-10-CM

## 2025-02-18 DIAGNOSIS — Z95.0 PACEMAKER: ICD-10-CM

## 2025-02-18 DIAGNOSIS — E78.2 MIXED HYPERLIPIDEMIA: ICD-10-CM

## 2025-02-18 RX ORDER — CARVEDILOL 6.25 MG/1
6.25 TABLET ORAL 2 TIMES DAILY WITH MEALS
Qty: 180 TABLET | Refills: 3 | Status: SHIPPED | OUTPATIENT
Start: 2025-02-18

## 2025-02-18 NOTE — TELEPHONE ENCOUNTER
Pt notified, MRI canceled , CT scheduled for 2/26/25- But PT will be out of town , I gave him the number to reschedule that imaging. He will reschedule it for himself. Just a SAMUEL

## 2025-03-04 ENCOUNTER — HOSPITAL ENCOUNTER (OUTPATIENT)
Dept: CT IMAGING | Facility: HOSPITAL | Age: 75
Discharge: HOME | End: 2025-03-04
Admitting: INTERNAL MEDICINE
Payer: MEDICARE

## 2025-03-04 DIAGNOSIS — R16.0 LIVER MASSES: ICD-10-CM

## 2025-03-04 LAB
CREAT BLDA-MCNC: 0.9 MG/DL (ref 0.6–1.3)
EGFRCR SERPLBLD CKD-EPI 2021: 88 ML/MIN/1.73

## 2025-03-04 PROCEDURE — 82565 ASSAY OF CREATININE: CPT

## 2025-03-04 PROCEDURE — 74170 CT ABD WO CNTRST FLWD CNTRST: CPT

## 2025-03-04 PROCEDURE — 25510000001 IOPAMIDOL PER 1 ML: Performed by: INTERNAL MEDICINE

## 2025-03-04 RX ORDER — IOPAMIDOL 755 MG/ML
100 INJECTION, SOLUTION INTRAVASCULAR
Status: COMPLETED | OUTPATIENT
Start: 2025-03-04 | End: 2025-03-04

## 2025-03-04 RX ADMIN — IOPAMIDOL 100 ML: 755 INJECTION, SOLUTION INTRAVENOUS at 09:33
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
## 2025-03-05 ENCOUNTER — OFFICE VISIT (OUTPATIENT)
Dept: ORTHOPEDIC SURGERY | Facility: CLINIC | Age: 75
End: 2025-03-05
Payer: MEDICARE

## 2025-03-05 ENCOUNTER — OFFICE VISIT (OUTPATIENT)
Dept: PSYCHIATRY | Facility: CLINIC | Age: 75
End: 2025-03-05
Payer: MEDICARE

## 2025-03-05 VITALS
WEIGHT: 203 LBS | HEIGHT: 71 IN | HEART RATE: 97 BPM | DIASTOLIC BLOOD PRESSURE: 79 MMHG | SYSTOLIC BLOOD PRESSURE: 149 MMHG | BODY MASS INDEX: 28.42 KG/M2

## 2025-03-05 VITALS
WEIGHT: 203 LBS | HEART RATE: 75 BPM | OXYGEN SATURATION: 95 % | BODY MASS INDEX: 28.42 KG/M2 | HEIGHT: 71 IN | DIASTOLIC BLOOD PRESSURE: 82 MMHG | SYSTOLIC BLOOD PRESSURE: 152 MMHG

## 2025-03-05 DIAGNOSIS — F43.10 POST TRAUMATIC STRESS DISORDER (PTSD): ICD-10-CM

## 2025-03-05 DIAGNOSIS — F33.1 MDD (MAJOR DEPRESSIVE DISORDER), RECURRENT EPISODE, MODERATE: Primary | ICD-10-CM

## 2025-03-05 DIAGNOSIS — M65.341 TRIGGER RING FINGER OF RIGHT HAND: Primary | ICD-10-CM

## 2025-03-05 DIAGNOSIS — F51.04 INSOMNIA, PSYCHOPHYSIOLOGICAL: ICD-10-CM

## 2025-03-05 RX ORDER — CLONAZEPAM 0.5 MG/1
TABLET ORAL
COMMUNITY

## 2025-03-05 RX ORDER — LIDOCAINE HYDROCHLORIDE 10 MG/ML
1 INJECTION, SOLUTION INFILTRATION; PERINEURAL
Status: COMPLETED | OUTPATIENT
Start: 2025-03-05 | End: 2025-03-05

## 2025-03-05 RX ORDER — TRIAMCINOLONE ACETONIDE 40 MG/ML
40 INJECTION, SUSPENSION INTRA-ARTICULAR; INTRAMUSCULAR
Status: COMPLETED | OUTPATIENT
Start: 2025-03-05 | End: 2025-03-05

## 2025-03-05 RX ADMIN — TRIAMCINOLONE ACETONIDE 40 MG: 40 INJECTION, SUSPENSION INTRA-ARTICULAR; INTRAMUSCULAR at 10:00

## 2025-03-05 RX ADMIN — LIDOCAINE HYDROCHLORIDE 1 ML: 10 INJECTION, SOLUTION INFILTRATION; PERINEURAL at 10:00

## 2025-03-05 NOTE — PROGRESS NOTES
"Chief Complaint  Follow-up of the Right Hand    Subjective          Alexandre Alvarado presents to [facility] for a follow up for his right hand.    History of Present Illness    The patient presents here today for a follow up for his right hand. He has been treating his right ring trigger finger conservatively. He was last seen in the office on 04/17/24 where he had a right ring trigger finger injection. He states the injection gave him great relief but has recently worn off. He states he has locking, catching and triggering to his right ring finger. He denies any new injury or falls since his last visit.     Allergies   Allergen Reactions    Ibuprofen GI Intolerance     Patient states \"I get ulcers\"    Nsaids GI Intolerance     Pt states ulcers when taking it.         Social History     Socioeconomic History    Marital status:    Tobacco Use    Smoking status: Never    Smokeless tobacco: Never   Vaping Use    Vaping status: Never Used   Substance and Sexual Activity    Alcohol use: Yes     Alcohol/week: 1.0 standard drink of alcohol     Types: 1 Glasses of wine per week     Comment: Current some day    Drug use: Never    Sexual activity: Yes     Partners: Female     Birth control/protection: Vasectomy, Surgical        I reviewed the patient's chief complaint, history of present illness, review of systems, past medical history, surgical history, family history, social history, medications, and allergy list.     REVIEW OF SYSTEMS    Constitutional: Denies fevers, chills, weight loss  Cardiovascular: Denies chest pain, shortness of breath  Skin: Denies rashes, acute skin changes  Neurologic: Denies headache, loss of consciousness  MSK: right hand pain       Objective   Vital Signs:   /82   Pulse 75   Ht 180.3 cm (70.98\")   Wt 92.1 kg (203 lb)   SpO2 95%   BMI 28.33 kg/m²     Body mass index is 28.33 kg/m².    Physical Exam    General: Alert. No acute distress.   Right " upper extremity:  triggering and locking of the 4th finger. Neurovascularly intact. Sensation to light touch median, radial, ulnar nerve. Positive AIN, PIN, ulnar nerve motor function. Positive pulses. Tender to the A-1 pulley.     Right ring trigger finger  Consent given by: patient  Site marked: site marked  Timeout: Immediately prior to procedure a time out was called to verify the correct patient, procedure, equipment, support staff and site/side marked as required   Procedure Details  Location: ring finger (right ring trigger finger) -   Preparation: Patient was prepped and draped in the usual sterile fashion  Needle gauge: 23g.  Medications administered: 1 mL lidocaine 1 %; 40 mg triamcinolone acetonide 40 MG/ML  Patient tolerance: patient tolerated the procedure well with no immediate complications    This injection documentation was Scribed for Wolf Ellington MD by Munira Ochoa MA.  03/05/25   10:12 EST    Imaging Results (Most Recent)       None              Assessment and Plan        US Liver    Result Date: 2/14/2025  Narrative: US LIVER Date of Exam: 2/12/2025 10:56 AM EST Indication: Liver lesion. Comparison: Thoracic spine CT dated 2/12/2025 Technique: Grayscale and color Doppler ultrasound evaluation of the right upper quadrant was performed. Findings: The visualized portions of the pancreas appear within normal limits. The liver length is at the upper limit of normal measuring 17 cm in craniocaudal dimension. There is diffusely increased echogenicity liver parenchyma compatible with underlying hepatic steatosis. There is a solid 2.0 x 1.5 x 2.1 cm hypoechoic liver lesion within the superior right hepatic lobe of the liver. There are additional cystic-appearing lesions within the inferior aspect of the liver, likely left and right hepatic lobes. There is normal hepatopetal flow within the main portal vein. There is no intrahepatic biliary ductal dilation. The common bile duct is normal in caliber  measuring 4 mm. The gallbladder is present without wall thickening, stones, or sludge. The right kidney measures 11.5 x 5.0 x 5.3 cm. There is a small cyst versus prominent pyramid measuring 1.1 cm. There is no hydronephrosis. There is no ascites.     Impression: Impression: 1.Solid hypoechoic lesion within the superior right hepatic lobe measuring up to 2.1 cm. Recommend further evaluation with MRI of the abdomen with and without contrast. 2.Additional cystic-appearing lesions within the inferior aspect of the liver, likely left and right hepatic lobes. These can also be further evaluated at the time of MRI. 3.Diffuse hepatic steatosis. 4.Normal appearance of the gallbladder. No biliary ductal dilation. Electronically Signed: Arjun Elizondoelor  2/14/2025 4:26 PM EST  Workstation ID: [id number]    Holter Monitor - 72 Hour Up To 15 Days    Result Date: 2/11/2025  Narrative: Patient is monitored for 7 days. Maximum heart rate is 124.  Minimum heart rate of 58.  Average heart rate of 79. There are occasional PACs.  There are frequent PVCs. There were short runs of nonsustained supraventricular tachycardia.       Diagnoses and all orders for this visit:    1. Trigger ring finger of right hand (Primary)        The patient presents here today for a follow up for his right ring trigger finger.      Discussed operative treatment options regarding a right ring trigger finger release versus non operative treatment options regarding injections, oral medications and therapy.     Patient wishes to proceed with conservative treatment options.    Discussed the risks and benefits of a right ring trigger finger steroid injection today in the office. Patient expressed understanding and wishes to proceed. Patient tolerted the injection well and without any complications.     He will continue range of motion as tolerated and over the counter medications.      Call or return if worsening symptoms.    Scribed for Wolf Ellington MD by Jenifer  Eleazar  03/05/2025   09:55 EST         Follow Up     PRN     Patient was given instructions and counseling regarding his condition or for health maintenance advice. Please see specific information pulled into the AVS if appropriate.       I have personally performed the services described in this document as scribed by the above individual and it is both accurate and complete. Wolf Ellington MD 03/05/25 10:36 EST

## 2025-03-05 NOTE — PROGRESS NOTES
The patient left the office before care was provided and did not complete the visit  due to not wanting medication services .

## 2025-03-06 ENCOUNTER — TELEPHONE (OUTPATIENT)
Dept: PSYCHIATRY | Facility: CLINIC | Age: 75
End: 2025-03-06
Payer: MEDICARE

## 2025-03-06 NOTE — TELEPHONE ENCOUNTER
No additional note at this time  
PT(PATIENT) VERIFIED     CONTACTED PT(PATIENT) PER PROVIDER'S INSTRUCTIONS     Jonelle Bruno APRN  [facility] Clinical Pool3 hours ago (7:56 AM)     I would like to let him know that I am working on addending my charting from to reflect that the patient left after being seen, and to better reflect our encounter. My intentions were to prevent a charge to his insurance, and I am working with administrators on a better way to capture the encounter without charging the patient. Should he have questions, he can reach out to me.     PT(PATIENT) VERBALIZED UNDERSTANDING AND HAD NO FURTHER QUESTIONS AT THIS TIME   
No
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
## 2025-03-11 ENCOUNTER — TELEPHONE (OUTPATIENT)
Dept: INTERNAL MEDICINE | Age: 75
End: 2025-03-11
Payer: MEDICARE

## 2025-04-22 ENCOUNTER — LAB (OUTPATIENT)
Dept: LAB | Facility: HOSPITAL | Age: 75
End: 2025-04-22
Payer: MEDICARE

## 2025-04-22 DIAGNOSIS — T39.395A GASTRIC ULCER DUE TO NONSTEROIDAL ANTIINFLAMMATORY DRUG (NSAID) THERAPY: ICD-10-CM

## 2025-04-22 DIAGNOSIS — Z00.00 MEDICARE ANNUAL WELLNESS VISIT, SUBSEQUENT: ICD-10-CM
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
## 2025-04-22 DIAGNOSIS — R73.01 IFG (IMPAIRED FASTING GLUCOSE): ICD-10-CM

## 2025-04-22 DIAGNOSIS — I48.0 PAROXYSMAL ATRIAL FIBRILLATION: ICD-10-CM

## 2025-04-22 DIAGNOSIS — I49.5 SSS (SICK SINUS SYNDROME): ICD-10-CM

## 2025-04-22 DIAGNOSIS — K25.9 GASTRIC ULCER DUE TO NONSTEROIDAL ANTIINFLAMMATORY DRUG (NSAID) THERAPY: ICD-10-CM

## 2025-04-22 DIAGNOSIS — E55.9 VITAMIN D DEFICIENCY: ICD-10-CM

## 2025-04-22 DIAGNOSIS — I10 HYPERTENSION, ESSENTIAL: ICD-10-CM

## 2025-04-22 DIAGNOSIS — M54.50 CHRONIC RIGHT-SIDED LOW BACK PAIN WITHOUT SCIATICA: ICD-10-CM

## 2025-04-22 DIAGNOSIS — Z12.5 SCREENING PSA (PROSTATE SPECIFIC ANTIGEN): ICD-10-CM

## 2025-04-22 DIAGNOSIS — G89.29 CHRONIC RIGHT-SIDED LOW BACK PAIN WITHOUT SCIATICA: ICD-10-CM

## 2025-04-22 DIAGNOSIS — Z95.0 PACEMAKER: ICD-10-CM

## 2025-04-22 DIAGNOSIS — E78.2 MIXED HYPERLIPIDEMIA: ICD-10-CM

## 2025-04-22 DIAGNOSIS — N40.0 PROSTATIC HYPERTROPHY: ICD-10-CM

## 2025-04-22 LAB
ALBUMIN SERPL-MCNC: 4.5 G/DL (ref 3.5–5.2)
ALBUMIN/GLOB SERPL: 1.6 G/DL
ALP SERPL-CCNC: 52 U/L (ref 39–117)
ALT SERPL W P-5'-P-CCNC: 19 U/L (ref 1–41)
ANION GAP SERPL CALCULATED.3IONS-SCNC: 10.7 MMOL/L (ref 5–15)
AST SERPL-CCNC: 20 U/L (ref 1–40)
BASOPHILS # BLD AUTO: 0.03 10*3/MM3 (ref 0–0.2)
BASOPHILS NFR BLD AUTO: 0.3 % (ref 0–1.5)
BILIRUB SERPL-MCNC: 0.6 MG/DL (ref 0–1.2)
BUN SERPL-MCNC: 16 MG/DL (ref 8–23)
BUN/CREAT SERPL: 13.4 (ref 7–25)
CALCIUM SPEC-SCNC: 9.6 MG/DL (ref 8.6–10.5)
CHLORIDE SERPL-SCNC: 101 MMOL/L (ref 98–107)
CHOLEST SERPL-MCNC: 128 MG/DL (ref 0–200)
CO2 SERPL-SCNC: 26.3 MMOL/L (ref 22–29)
CREAT SERPL-MCNC: 1.19 MG/DL (ref 0.76–1.27)
DEPRECATED RDW RBC AUTO: 40.8 FL (ref 37–54)
EGFRCR SERPLBLD CKD-EPI 2021: 62.9 ML/MIN/1.73
EOSINOPHIL # BLD AUTO: 0.08 10*3/MM3 (ref 0–0.4)
EOSINOPHIL NFR BLD AUTO: 0.8 % (ref 0.3–6.2)
ERYTHROCYTE [DISTWIDTH] IN BLOOD BY AUTOMATED COUNT: 11.8 % (ref 12.3–15.4)
GLOBULIN UR ELPH-MCNC: 2.9 GM/DL
GLUCOSE SERPL-MCNC: 157 MG/DL (ref 65–99)
HBA1C MFR BLD: 6.4 % (ref 4.8–5.6)
HCT VFR BLD AUTO: 45.3 % (ref 37.5–51)
HDLC SERPL-MCNC: 45 MG/DL (ref 40–60)
HGB BLD-MCNC: 15.4 G/DL (ref 13–17.7)
IMM GRANULOCYTES # BLD AUTO: 0.05 10*3/MM3 (ref 0–0.05)
IMM GRANULOCYTES NFR BLD AUTO: 0.5 % (ref 0–0.5)
LDLC SERPL CALC-MCNC: 61 MG/DL (ref 0–100)
LDLC/HDLC SERPL: 1.29 {RATIO}
LYMPHOCYTES # BLD AUTO: 2.9 10*3/MM3 (ref 0.7–3.1)
LYMPHOCYTES NFR BLD AUTO: 30.8 % (ref 19.6–45.3)
MAGNESIUM SERPL-MCNC: 2.2 MG/DL (ref 1.6–2.4)
MCH RBC QN AUTO: 32.2 PG (ref 26.6–33)
MCHC RBC AUTO-ENTMCNC: 34 G/DL (ref 31.5–35.7)
MCV RBC AUTO: 94.8 FL (ref 79–97)
MONOCYTES # BLD AUTO: 0.64 10*3/MM3 (ref 0.1–0.9)
MONOCYTES NFR BLD AUTO: 6.8 % (ref 5–12)
NEUTROPHILS NFR BLD AUTO: 5.72 10*3/MM3 (ref 1.7–7)
NEUTROPHILS NFR BLD AUTO: 60.8 % (ref 42.7–76)
NRBC BLD AUTO-RTO: 0 /100 WBC (ref 0–0.2)
PLATELET # BLD AUTO: 273 10*3/MM3 (ref 140–450)
PMV BLD AUTO: 10 FL (ref 6–12)
POTASSIUM SERPL-SCNC: 4.1 MMOL/L (ref 3.5–5.2)
PROT SERPL-MCNC: 7.4 G/DL (ref 6–8.5)
PSA SERPL-MCNC: 3.34 NG/ML (ref 0–4)
RBC # BLD AUTO: 4.78 10*6/MM3 (ref 4.14–5.8)
SODIUM SERPL-SCNC: 138 MMOL/L (ref 136–145)
TRIGL SERPL-MCNC: 124 MG/DL (ref 0–150)
VLDLC SERPL-MCNC: 22 MG/DL (ref 5–40)
WBC NRBC COR # BLD AUTO: 9.42 10*3/MM3 (ref 3.4–10.8)

## 2025-04-22 PROCEDURE — 80053 COMPREHEN METABOLIC PANEL: CPT

## 2025-04-22 PROCEDURE — 83036 HEMOGLOBIN GLYCOSYLATED A1C: CPT

## 2025-04-22 PROCEDURE — 36415 COLL VENOUS BLD VENIPUNCTURE: CPT

## 2025-04-22 PROCEDURE — 85025 COMPLETE CBC W/AUTO DIFF WBC: CPT

## 2025-04-22 PROCEDURE — 83735 ASSAY OF MAGNESIUM: CPT

## 2025-04-22 PROCEDURE — 80061 LIPID PANEL: CPT

## 2025-04-22 PROCEDURE — G0103 PSA SCREENING: HCPCS

## 2025-04-23 ENCOUNTER — OFFICE VISIT (OUTPATIENT)
Dept: INTERNAL MEDICINE | Age: 75
End: 2025-04-23
Payer: MEDICARE

## 2025-04-23 VITALS
OXYGEN SATURATION: 94 % | HEIGHT: 71 IN | WEIGHT: 200 LBS | HEART RATE: 94 BPM | TEMPERATURE: 98.6 F | BODY MASS INDEX: 28 KG/M2 | SYSTOLIC BLOOD PRESSURE: 157 MMHG | DIASTOLIC BLOOD PRESSURE: 85 MMHG

## 2025-04-23 DIAGNOSIS — I48.0 PAROXYSMAL ATRIAL FIBRILLATION: ICD-10-CM

## 2025-04-23 DIAGNOSIS — R16.0 LIVER MASSES: ICD-10-CM

## 2025-04-23 DIAGNOSIS — M54.6 ACUTE BILATERAL THORACIC BACK PAIN: ICD-10-CM

## 2025-04-23 DIAGNOSIS — E55.9 VITAMIN D DEFICIENCY: ICD-10-CM

## 2025-04-23 DIAGNOSIS — Z95.0 PACEMAKER: ICD-10-CM

## 2025-04-23 DIAGNOSIS — Z12.5 SCREENING PSA (PROSTATE SPECIFIC ANTIGEN): ICD-10-CM

## 2025-04-23 DIAGNOSIS — R73.01 IFG (IMPAIRED FASTING GLUCOSE): ICD-10-CM

## 2025-04-23 DIAGNOSIS — M54.50 ACUTE BILATERAL LOW BACK PAIN WITHOUT SCIATICA: ICD-10-CM

## 2025-04-23 DIAGNOSIS — I63.00 CEREBROVASCULAR ACCIDENT (CVA) DUE TO THROMBOSIS OF PRECEREBRAL ARTERY: Primary | ICD-10-CM

## 2025-04-23 DIAGNOSIS — I10 HYPERTENSION, ESSENTIAL: ICD-10-CM

## 2025-04-23 DIAGNOSIS — G60.9 PERIPHERAL NEUROPATHY, IDIOPATHIC: ICD-10-CM

## 2025-04-23 DIAGNOSIS — M54.32 SCIATICA OF LEFT SIDE: ICD-10-CM

## 2025-04-23 RX ORDER — GABAPENTIN 300 MG/1
300 CAPSULE ORAL 3 TIMES DAILY
Qty: 90 CAPSULE | Refills: 5 | Status: SHIPPED | OUTPATIENT
Start: 2025-04-23

## 2025-04-23 RX ORDER — AMITRIPTYLINE HYDROCHLORIDE 10 MG/1
10 TABLET ORAL NIGHTLY
COMMUNITY
End: 2025-04-23 | Stop reason: SDUPTHER

## 2025-04-23 RX ORDER — AMITRIPTYLINE HYDROCHLORIDE 10 MG/1
10 TABLET ORAL NIGHTLY
Qty: 90 TABLET | Refills: 3 | Status: SHIPPED | OUTPATIENT
Start: 2025-04-23

## 2025-04-23 RX ORDER — OXYCODONE AND ACETAMINOPHEN 5; 325 MG/1; MG/1
1 TABLET ORAL ONCE AS NEEDED
COMMUNITY
Start: 2025-04-14
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
## 2025-05-05 ENCOUNTER — TELEPHONE (OUTPATIENT)
Dept: INTERNAL MEDICINE | Age: 75
End: 2025-05-05

## 2025-05-05 NOTE — TELEPHONE ENCOUNTER
Caller: Alexandre Alvarado    Relationship: Self    Best call back number: [phone]    What is the provider, practice or medical service name: DR. LEON    Any additional details: PATIENT TRIED TO SCHEDULE WITH DR LEON BUT THEY DID NOT RECEIVE THE REFERRAL    
Per care everywhere, patient has an appointment with Dr. Guillaume on 05/22/2025  
no

## 2025-06-10 ENCOUNTER — CLINICAL SUPPORT NO REQUIREMENTS (OUTPATIENT)
Dept: CARDIOLOGY | Facility: CLINIC | Age: 75
End: 2025-06-10
Payer: MEDICARE

## 2025-06-10 DIAGNOSIS — Z95.0 PACEMAKER: Primary | ICD-10-CM

## 2025-06-10 DIAGNOSIS — I49.5 SSS (SICK SINUS SYNDROME): ICD-10-CM

## 2025-06-18 ENCOUNTER — OFFICE VISIT (OUTPATIENT)
Dept: CARDIOLOGY | Facility: CLINIC | Age: 75
End: 2025-06-18
Payer: MEDICARE

## 2025-06-18 VITALS
HEART RATE: 80 BPM | WEIGHT: 200 LBS | BODY MASS INDEX: 28 KG/M2 | HEIGHT: 71 IN | DIASTOLIC BLOOD PRESSURE: 82 MMHG | SYSTOLIC BLOOD PRESSURE: 137 MMHG

## 2025-06-18 DIAGNOSIS — I10 HYPERTENSION, ESSENTIAL: Primary | ICD-10-CM

## 2025-06-18 PROCEDURE — 99214 OFFICE O/P EST MOD 30 MIN: CPT | Performed by: NURSE PRACTITIONER

## 2025-06-18 PROCEDURE — 3079F DIAST BP 80-89 MM HG: CPT | Performed by: NURSE PRACTITIONER

## 2025-06-18 PROCEDURE — 1159F MED LIST DOCD IN RCRD: CPT | Performed by: NURSE PRACTITIONER

## 2025-06-18 PROCEDURE — 3075F SYST BP GE 130 - 139MM HG: CPT | Performed by: NURSE PRACTITIONER

## 2025-06-18 PROCEDURE — 1160F RVW MEDS BY RX/DR IN RCRD: CPT | Performed by: NURSE PRACTITIONER
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
## 2025-06-24 ENCOUNTER — PATIENT MESSAGE (OUTPATIENT)
Dept: INTERNAL MEDICINE | Age: 75
End: 2025-06-24
Payer: MEDICARE

## 2025-06-24 DIAGNOSIS — F41.9 ANXIETY: Primary | ICD-10-CM

## 2025-06-24 DIAGNOSIS — R51.9 FREQUENT HEADACHES: ICD-10-CM

## 2025-06-24 DIAGNOSIS — M17.0 BILATERAL PRIMARY OSTEOARTHRITIS OF KNEE: ICD-10-CM

## 2025-06-25 RX ORDER — GABAPENTIN 600 MG/1
600 TABLET ORAL 3 TIMES DAILY
Qty: 90 TABLET | Refills: 5 | Status: SHIPPED | OUTPATIENT
Start: 2025-06-25

## 2025-07-29 LAB
MC_CV_MDC_IDC_RATE_1: 160
MC_CV_MDC_IDC_ZONE_ID: 1
MDC_IDC_MSMT_BATTERY_REMAINING_LONGEVITY: 78 MO
MDC_IDC_MSMT_BATTERY_REMAINING_PERCENTAGE: 94 %
MDC_IDC_MSMT_BATTERY_STATUS: NORMAL
MDC_IDC_MSMT_LEADCHNL_RV_DTM: NORMAL
MDC_IDC_MSMT_LEADCHNL_RV_IMPEDANCE_VALUE: 200
MDC_IDC_MSMT_LEADCHNL_RV_PACING_THRESHOLD_POLARITY: NORMAL
MDC_IDC_MSMT_LEADCHNL_RV_SENSING_INTR_AMPL: 2.7
MDC_IDC_PG_IMPLANT_DTM: NORMAL
MDC_IDC_PG_MFG: NORMAL
MDC_IDC_PG_MODEL: NORMAL
MDC_IDC_PG_SERIAL: NORMAL
MDC_IDC_PG_TYPE: NORMAL
MDC_IDC_SESS_DTM: NORMAL
MDC_IDC_SESS_TYPE: NORMAL
MDC_IDC_SET_BRADY_LOWRATE: 40
MDC_IDC_SET_BRADY_MODE: NORMAL
MDC_IDC_SET_LEADCHNL_RV_PACING_AMPLITUDE: 2.4
MDC_IDC_SET_LEADCHNL_RV_PACING_POLARITY: NORMAL
MDC_IDC_SET_LEADCHNL_RV_PACING_PULSEWIDTH: 0.4
MDC_IDC_SET_LEADCHNL_RV_SENSING_POLARITY: NORMAL
MDC_IDC_SET_LEADCHNL_RV_SENSING_SENSITIVITY: 1
MDC_IDC_SET_ZONE_STATUS: NORMAL
MDC_IDC_SET_ZONE_TYPE: NORMAL
MDC_IDC_STAT_BRADY_RV_PERCENT_PACED: 0

## 2025-08-11 ENCOUNTER — TELEPHONE (OUTPATIENT)
Dept: INTERNAL MEDICINE | Age: 75
End: 2025-08-11
Payer: MEDICARE

## 2025-08-11 DIAGNOSIS — Z79.899 MEDICATION MANAGEMENT: Primary | ICD-10-CM

## 2025-08-13 ENCOUNTER — OFFICE VISIT (OUTPATIENT)
Dept: ORTHOPEDIC SURGERY | Facility: CLINIC | Age: 75
End: 2025-08-13
Payer: MEDICARE

## 2025-08-13 VITALS — BODY MASS INDEX: 27.44 KG/M2 | HEIGHT: 71 IN | WEIGHT: 196 LBS

## 2025-08-13 DIAGNOSIS — M25.551 RIGHT HIP PAIN: Primary | ICD-10-CM

## 2025-08-13 DIAGNOSIS — M54.16 LUMBAR RADICULOPATHY: ICD-10-CM

## 2025-08-14 LAB
MC_CV_MDC_IDC_RATE_1: 160
MC_CV_MDC_IDC_ZONE_ID: 1
MDC_IDC_MSMT_BATTERY_REMAINING_LONGEVITY: 78 MO
MDC_IDC_MSMT_BATTERY_REMAINING_PERCENTAGE: 93 %
MDC_IDC_MSMT_BATTERY_STATUS: NORMAL
MDC_IDC_MSMT_LEADCHNL_RV_DTM: NORMAL
MDC_IDC_MSMT_LEADCHNL_RV_IMPEDANCE_VALUE: 234
MDC_IDC_MSMT_LEADCHNL_RV_PACING_THRESHOLD_POLARITY: NORMAL
MDC_IDC_MSMT_LEADCHNL_RV_SENSING_INTR_AMPL: 2.6
MDC_IDC_PG_IMPLANT_DTM: NORMAL
MDC_IDC_PG_MFG: NORMAL
MDC_IDC_PG_MODEL: NORMAL
MDC_IDC_PG_SERIAL: NORMAL
MDC_IDC_PG_TYPE: NORMAL
MDC_IDC_SESS_DTM: NORMAL
MDC_IDC_SESS_TYPE: NORMAL
MDC_IDC_SET_BRADY_LOWRATE: 40
MDC_IDC_SET_BRADY_MODE: NORMAL
MDC_IDC_SET_LEADCHNL_RV_PACING_AMPLITUDE: 2.4
MDC_IDC_SET_LEADCHNL_RV_PACING_POLARITY: NORMAL
MDC_IDC_SET_LEADCHNL_RV_PACING_PULSEWIDTH: 0.4
MDC_IDC_SET_LEADCHNL_RV_SENSING_POLARITY: NORMAL
MDC_IDC_SET_LEADCHNL_RV_SENSING_SENSITIVITY: 1
MDC_IDC_SET_ZONE_STATUS: NORMAL
MDC_IDC_SET_ZONE_TYPE: NORMAL
MDC_IDC_STAT_BRADY_RV_PERCENT_PACED: 0

## (undated) DEVICE — EGD OR ERCP KIT: Brand: MEDLINE INDUSTRIES, INC.

## (undated) DEVICE — SOL IRRG H2O PL/BG 1000ML STRL